# Patient Record
Sex: MALE | Race: WHITE | NOT HISPANIC OR LATINO
[De-identification: names, ages, dates, MRNs, and addresses within clinical notes are randomized per-mention and may not be internally consistent; named-entity substitution may affect disease eponyms.]

---

## 2017-01-12 ENCOUNTER — MEDICATION RENEWAL (OUTPATIENT)
Age: 82
End: 2017-01-12

## 2017-01-20 ENCOUNTER — MEDICATION RENEWAL (OUTPATIENT)
Age: 82
End: 2017-01-20

## 2017-02-03 ENCOUNTER — APPOINTMENT (OUTPATIENT)
Dept: FAMILY MEDICINE | Facility: CLINIC | Age: 82
End: 2017-02-03

## 2017-02-03 VITALS
SYSTOLIC BLOOD PRESSURE: 130 MMHG | WEIGHT: 185 LBS | OXYGEN SATURATION: 94 % | TEMPERATURE: 98.2 F | HEART RATE: 72 BPM | DIASTOLIC BLOOD PRESSURE: 78 MMHG | RESPIRATION RATE: 16 BRPM | BODY MASS INDEX: 26.48 KG/M2 | HEIGHT: 70 IN

## 2017-02-13 ENCOUNTER — RX RENEWAL (OUTPATIENT)
Age: 82
End: 2017-02-13

## 2017-03-13 ENCOUNTER — RX RENEWAL (OUTPATIENT)
Age: 82
End: 2017-03-13

## 2017-03-18 ENCOUNTER — RX RENEWAL (OUTPATIENT)
Age: 82
End: 2017-03-18

## 2017-04-04 ENCOUNTER — APPOINTMENT (OUTPATIENT)
Dept: NEPHROLOGY | Facility: CLINIC | Age: 82
End: 2017-04-04

## 2017-04-04 VITALS
DIASTOLIC BLOOD PRESSURE: 73 MMHG | HEIGHT: 70 IN | OXYGEN SATURATION: 96 % | HEART RATE: 64 BPM | WEIGHT: 186 LBS | SYSTOLIC BLOOD PRESSURE: 165 MMHG | BODY MASS INDEX: 26.63 KG/M2

## 2017-04-04 VITALS — DIASTOLIC BLOOD PRESSURE: 62 MMHG | SYSTOLIC BLOOD PRESSURE: 128 MMHG

## 2017-04-05 LAB
ALBUMIN SERPL ELPH-MCNC: 3.9 G/DL
ANION GAP SERPL CALC-SCNC: 16 MMOL/L
BASOPHILS # BLD AUTO: 0.05 K/UL
BASOPHILS NFR BLD AUTO: 0.7 %
BUN SERPL-MCNC: 26 MG/DL
CALCIUM SERPL-MCNC: 9.2 MG/DL
CHLORIDE SERPL-SCNC: 105 MMOL/L
CO2 SERPL-SCNC: 22 MMOL/L
CREAT SERPL-MCNC: 1.5 MG/DL
CREAT SPEC-SCNC: 104 MG/DL
CREAT/PROT UR: 0.2 RATIO
EOSINOPHIL # BLD AUTO: 0.25 K/UL
EOSINOPHIL NFR BLD AUTO: 3.4 %
GLUCOSE SERPL-MCNC: 120 MG/DL
HCT VFR BLD CALC: 42.6 %
HGB BLD-MCNC: 13.5 G/DL
IMM GRANULOCYTES NFR BLD AUTO: 0.1 %
LYMPHOCYTES # BLD AUTO: 1.58 K/UL
LYMPHOCYTES NFR BLD AUTO: 21.7 %
MAN DIFF?: NORMAL
MCHC RBC-ENTMCNC: 28.1 PG
MCHC RBC-ENTMCNC: 31.7 GM/DL
MCV RBC AUTO: 88.8 FL
MONOCYTES # BLD AUTO: 0.69 K/UL
MONOCYTES NFR BLD AUTO: 9.5 %
NEUTROPHILS # BLD AUTO: 4.71 K/UL
NEUTROPHILS NFR BLD AUTO: 64.6 %
PHOSPHATE SERPL-MCNC: 2.4 MG/DL
PLATELET # BLD AUTO: 226 K/UL
POTASSIUM SERPL-SCNC: 4.3 MMOL/L
PROT UR-MCNC: 24 MG/DL
RBC # BLD: 4.8 M/UL
RBC # FLD: 14.5 %
SODIUM SERPL-SCNC: 143 MMOL/L
WBC # FLD AUTO: 7.29 K/UL

## 2017-04-07 LAB
25(OH)D3 SERPL-MCNC: 34 NG/ML
CALCIUM SERPL-MCNC: 9.7 MG/DL
PARATHYROID HORMONE INTACT: 45 PG/ML

## 2017-04-28 ENCOUNTER — RX RENEWAL (OUTPATIENT)
Age: 82
End: 2017-04-28

## 2017-05-15 ENCOUNTER — MEDICATION RENEWAL (OUTPATIENT)
Age: 82
End: 2017-05-15

## 2017-05-15 ENCOUNTER — RX RENEWAL (OUTPATIENT)
Age: 82
End: 2017-05-15

## 2017-06-21 ENCOUNTER — APPOINTMENT (OUTPATIENT)
Dept: FAMILY MEDICINE | Facility: CLINIC | Age: 82
End: 2017-06-21

## 2017-06-29 ENCOUNTER — APPOINTMENT (OUTPATIENT)
Dept: NEPHROLOGY | Facility: CLINIC | Age: 82
End: 2017-06-29

## 2017-06-29 VITALS — DIASTOLIC BLOOD PRESSURE: 70 MMHG | HEART RATE: 70 BPM | SYSTOLIC BLOOD PRESSURE: 124 MMHG

## 2017-06-29 VITALS
OXYGEN SATURATION: 96 % | WEIGHT: 186.29 LBS | SYSTOLIC BLOOD PRESSURE: 180 MMHG | DIASTOLIC BLOOD PRESSURE: 77 MMHG | HEIGHT: 70 IN | BODY MASS INDEX: 26.67 KG/M2 | HEART RATE: 71 BPM

## 2017-06-29 VITALS — SYSTOLIC BLOOD PRESSURE: 140 MMHG | HEART RATE: 70 BPM | DIASTOLIC BLOOD PRESSURE: 70 MMHG

## 2017-07-11 ENCOUNTER — MEDICATION RENEWAL (OUTPATIENT)
Age: 82
End: 2017-07-11

## 2017-07-24 ENCOUNTER — APPOINTMENT (OUTPATIENT)
Dept: FAMILY MEDICINE | Facility: CLINIC | Age: 82
End: 2017-07-24

## 2017-07-24 VITALS
HEART RATE: 61 BPM | TEMPERATURE: 98.1 F | OXYGEN SATURATION: 96 % | SYSTOLIC BLOOD PRESSURE: 123 MMHG | DIASTOLIC BLOOD PRESSURE: 69 MMHG

## 2017-07-24 VITALS — RESPIRATION RATE: 14 BRPM

## 2017-07-24 DIAGNOSIS — C44.90 UNSPECIFIED MALIGNANT NEOPLASM OF SKIN, UNSPECIFIED: ICD-10-CM

## 2017-07-24 DIAGNOSIS — R79.89 OTHER SPECIFIED ABNORMAL FINDINGS OF BLOOD CHEMISTRY: ICD-10-CM

## 2017-07-26 LAB
ALBUMIN SERPL ELPH-MCNC: 4.4 G/DL
ALP BLD-CCNC: 71 U/L
ALT SERPL-CCNC: 15 U/L
ANION GAP SERPL CALC-SCNC: 13 MMOL/L
AST SERPL-CCNC: 17 U/L
BASOPHILS # BLD AUTO: 0.07 K/UL
BASOPHILS NFR BLD AUTO: 1.1 %
BILIRUB SERPL-MCNC: 0.2 MG/DL
BUN SERPL-MCNC: 27 MG/DL
CALCIUM SERPL-MCNC: 9.5 MG/DL
CHLORIDE SERPL-SCNC: 104 MMOL/L
CHOLEST SERPL-MCNC: 204 MG/DL
CHOLEST/HDLC SERPL: 4.4 RATIO
CK SERPL-CCNC: 92 U/L
CO2 SERPL-SCNC: 25 MMOL/L
CREAT SERPL-MCNC: 1.7 MG/DL
EOSINOPHIL # BLD AUTO: 0.19 K/UL
EOSINOPHIL NFR BLD AUTO: 3.1 %
FERRITIN SERPL-MCNC: 46 NG/ML
FOLATE SERPL-MCNC: >20 NG/ML
GLUCOSE SERPL-MCNC: 181 MG/DL
HBA1C MFR BLD HPLC: 5.8 %
HCT VFR BLD CALC: 44 %
HDLC SERPL-MCNC: 46 MG/DL
HGB BLD-MCNC: 13.5 G/DL
IMM GRANULOCYTES NFR BLD AUTO: 0.2 %
IRON SATN MFR SERPL: 14 %
IRON SERPL-MCNC: 45 UG/DL
LDLC SERPL CALC-MCNC: 90 MG/DL
LYMPHOCYTES # BLD AUTO: 1.54 K/UL
LYMPHOCYTES NFR BLD AUTO: 24.9 %
MAN DIFF?: NORMAL
MCHC RBC-ENTMCNC: 27.9 PG
MCHC RBC-ENTMCNC: 30.7 GM/DL
MCV RBC AUTO: 90.9 FL
MONOCYTES # BLD AUTO: 0.57 K/UL
MONOCYTES NFR BLD AUTO: 9.2 %
NEUTROPHILS # BLD AUTO: 3.81 K/UL
NEUTROPHILS NFR BLD AUTO: 61.5 %
PLATELET # BLD AUTO: 228 K/UL
POTASSIUM SERPL-SCNC: 4.7 MMOL/L
PROT SERPL-MCNC: 7.1 G/DL
RBC # BLD: 4.84 M/UL
RBC # FLD: 15.1 %
SODIUM SERPL-SCNC: 142 MMOL/L
TIBC SERPL-MCNC: 311 UG/DL
TRIGL SERPL-MCNC: 341 MG/DL
UIBC SERPL-MCNC: 266 UG/DL
VIT B12 SERPL-MCNC: 496 PG/ML
WBC # FLD AUTO: 6.19 K/UL

## 2017-08-09 ENCOUNTER — MEDICATION RENEWAL (OUTPATIENT)
Age: 82
End: 2017-08-09

## 2017-08-29 ENCOUNTER — OTHER (OUTPATIENT)
Age: 82
End: 2017-08-29

## 2017-09-16 ENCOUNTER — RX RENEWAL (OUTPATIENT)
Age: 82
End: 2017-09-16

## 2017-09-25 ENCOUNTER — RX RENEWAL (OUTPATIENT)
Age: 82
End: 2017-09-25

## 2017-09-27 ENCOUNTER — RX RENEWAL (OUTPATIENT)
Age: 82
End: 2017-09-27

## 2017-09-28 ENCOUNTER — APPOINTMENT (OUTPATIENT)
Dept: FAMILY MEDICINE | Facility: CLINIC | Age: 82
End: 2017-09-28
Payer: MEDICARE

## 2017-09-28 ENCOUNTER — RX RENEWAL (OUTPATIENT)
Age: 82
End: 2017-09-28

## 2017-09-28 PROCEDURE — 90688 IIV4 VACCINE SPLT 0.5 ML IM: CPT

## 2017-09-28 PROCEDURE — G0008: CPT

## 2017-10-05 ENCOUNTER — APPOINTMENT (OUTPATIENT)
Dept: FAMILY MEDICINE | Facility: CLINIC | Age: 82
End: 2017-10-05

## 2017-11-01 ENCOUNTER — MEDICATION RENEWAL (OUTPATIENT)
Age: 82
End: 2017-11-01

## 2017-11-03 ENCOUNTER — APPOINTMENT (OUTPATIENT)
Dept: NEPHROLOGY | Facility: CLINIC | Age: 82
End: 2017-11-03
Payer: MEDICARE

## 2017-11-03 VITALS — SYSTOLIC BLOOD PRESSURE: 134 MMHG | DIASTOLIC BLOOD PRESSURE: 80 MMHG | HEART RATE: 64 BPM

## 2017-11-03 VITALS
HEIGHT: 70 IN | DIASTOLIC BLOOD PRESSURE: 78 MMHG | WEIGHT: 187.39 LBS | HEART RATE: 66 BPM | SYSTOLIC BLOOD PRESSURE: 159 MMHG | OXYGEN SATURATION: 94 % | BODY MASS INDEX: 26.83 KG/M2

## 2017-11-03 LAB
25(OH)D3 SERPL-MCNC: 26.8 NG/ML
ALBUMIN SERPL ELPH-MCNC: 4.1 G/DL
ALP BLD-CCNC: 68 U/L
ALT SERPL-CCNC: 20 U/L
ANION GAP SERPL CALC-SCNC: 15 MMOL/L
AST SERPL-CCNC: 22 U/L
BASOPHILS # BLD AUTO: 0.05 K/UL
BASOPHILS NFR BLD AUTO: 0.7 %
BILIRUB SERPL-MCNC: 0.3 MG/DL
BUN SERPL-MCNC: 26 MG/DL
CALCIUM SERPL-MCNC: 9.4 MG/DL
CALCIUM SERPL-MCNC: 9.4 MG/DL
CHLORIDE SERPL-SCNC: 104 MMOL/L
CHOLEST SERPL-MCNC: 197 MG/DL
CHOLEST/HDLC SERPL: 4 RATIO
CO2 SERPL-SCNC: 24 MMOL/L
CREAT SERPL-MCNC: 1.6 MG/DL
CREAT SPEC-SCNC: 149 MG/DL
EOSINOPHIL # BLD AUTO: 0.18 K/UL
EOSINOPHIL NFR BLD AUTO: 2.5 %
GLUCOSE SERPL-MCNC: 92 MG/DL
HCT VFR BLD CALC: 44.5 %
HDLC SERPL-MCNC: 49 MG/DL
HGB BLD-MCNC: 14.1 G/DL
IMM GRANULOCYTES NFR BLD AUTO: 0.1 %
LDLC SERPL CALC-MCNC: 104 MG/DL
LYMPHOCYTES # BLD AUTO: 1.73 K/UL
LYMPHOCYTES NFR BLD AUTO: 24.4 %
MAN DIFF?: NORMAL
MCHC RBC-ENTMCNC: 28.5 PG
MCHC RBC-ENTMCNC: 31.7 GM/DL
MCV RBC AUTO: 89.9 FL
MICROALBUMIN 24H UR DL<=1MG/L-MCNC: 14 MG/DL
MICROALBUMIN/CREAT 24H UR-RTO: 94 MG/G
MONOCYTES # BLD AUTO: 0.69 K/UL
MONOCYTES NFR BLD AUTO: 9.7 %
NEUTROPHILS # BLD AUTO: 4.43 K/UL
NEUTROPHILS NFR BLD AUTO: 62.6 %
PARATHYROID HORMONE INTACT: 70 PG/ML
PHOSPHATE SERPL-MCNC: 2.3 MG/DL
PLATELET # BLD AUTO: 208 K/UL
POTASSIUM SERPL-SCNC: 4.8 MMOL/L
PROT SERPL-MCNC: 7.8 G/DL
RBC # BLD: 4.95 M/UL
RBC # FLD: 14.9 %
SODIUM SERPL-SCNC: 143 MMOL/L
TRIGL SERPL-MCNC: 222 MG/DL
URATE SERPL-MCNC: 6.7 MG/DL
WBC # FLD AUTO: 7.09 K/UL

## 2017-11-03 PROCEDURE — 36415 COLL VENOUS BLD VENIPUNCTURE: CPT

## 2017-11-03 PROCEDURE — 99214 OFFICE O/P EST MOD 30 MIN: CPT | Mod: 25

## 2017-11-13 ENCOUNTER — MEDICATION RENEWAL (OUTPATIENT)
Age: 82
End: 2017-11-13

## 2018-01-19 ENCOUNTER — MEDICATION RENEWAL (OUTPATIENT)
Age: 83
End: 2018-01-19

## 2018-01-25 ENCOUNTER — RX RENEWAL (OUTPATIENT)
Age: 83
End: 2018-01-25

## 2018-02-05 ENCOUNTER — APPOINTMENT (OUTPATIENT)
Dept: FAMILY MEDICINE | Facility: CLINIC | Age: 83
End: 2018-02-05

## 2018-02-15 ENCOUNTER — APPOINTMENT (OUTPATIENT)
Dept: FAMILY MEDICINE | Facility: CLINIC | Age: 83
End: 2018-02-15
Payer: MEDICARE

## 2018-02-15 VITALS
HEIGHT: 70 IN | OXYGEN SATURATION: 88 % | HEART RATE: 74 BPM | WEIGHT: 180 LBS | SYSTOLIC BLOOD PRESSURE: 150 MMHG | DIASTOLIC BLOOD PRESSURE: 82 MMHG | TEMPERATURE: 98.5 F | BODY MASS INDEX: 25.77 KG/M2

## 2018-02-15 DIAGNOSIS — R05 COUGH: ICD-10-CM

## 2018-02-15 DIAGNOSIS — R06.02 SHORTNESS OF BREATH: ICD-10-CM

## 2018-02-15 PROCEDURE — 99214 OFFICE O/P EST MOD 30 MIN: CPT

## 2018-02-26 ENCOUNTER — APPOINTMENT (OUTPATIENT)
Dept: FAMILY MEDICINE | Facility: CLINIC | Age: 83
End: 2018-02-26
Payer: MEDICARE

## 2018-02-26 VITALS
HEIGHT: 70 IN | DIASTOLIC BLOOD PRESSURE: 68 MMHG | BODY MASS INDEX: 25.77 KG/M2 | HEART RATE: 73 BPM | WEIGHT: 180 LBS | OXYGEN SATURATION: 91 % | SYSTOLIC BLOOD PRESSURE: 150 MMHG

## 2018-02-26 VITALS — RESPIRATION RATE: 14 BRPM

## 2018-02-26 DIAGNOSIS — I65.29 OCCLUSION AND STENOSIS OF UNSPECIFIED CAROTID ARTERY: ICD-10-CM

## 2018-02-26 DIAGNOSIS — Z87.09 PERSONAL HISTORY OF OTHER DISEASES OF THE RESPIRATORY SYSTEM: ICD-10-CM

## 2018-02-26 PROCEDURE — 99214 OFFICE O/P EST MOD 30 MIN: CPT

## 2018-03-30 ENCOUNTER — RX RENEWAL (OUTPATIENT)
Age: 83
End: 2018-03-30

## 2018-04-06 ENCOUNTER — APPOINTMENT (OUTPATIENT)
Dept: NEPHROLOGY | Facility: CLINIC | Age: 83
End: 2018-04-06
Payer: MEDICARE

## 2018-04-06 VITALS — SYSTOLIC BLOOD PRESSURE: 180 MMHG | HEART RATE: 60 BPM | DIASTOLIC BLOOD PRESSURE: 80 MMHG

## 2018-04-06 VITALS
BODY MASS INDEX: 26.2 KG/M2 | DIASTOLIC BLOOD PRESSURE: 83 MMHG | HEART RATE: 61 BPM | OXYGEN SATURATION: 96 % | SYSTOLIC BLOOD PRESSURE: 191 MMHG | WEIGHT: 183 LBS | HEIGHT: 70 IN

## 2018-04-06 VITALS — DIASTOLIC BLOOD PRESSURE: 80 MMHG | SYSTOLIC BLOOD PRESSURE: 150 MMHG

## 2018-04-06 PROCEDURE — 99214 OFFICE O/P EST MOD 30 MIN: CPT | Mod: 25

## 2018-04-06 PROCEDURE — 36415 COLL VENOUS BLD VENIPUNCTURE: CPT

## 2018-04-11 LAB
25(OH)D3 SERPL-MCNC: 23.6 NG/ML
ALBUMIN SERPL ELPH-MCNC: 4.2 G/DL
ALP BLD-CCNC: 59 U/L
ALT SERPL-CCNC: 19 U/L
ANION GAP SERPL CALC-SCNC: 14 MMOL/L
AST SERPL-CCNC: 21 U/L
BASOPHILS # BLD AUTO: 0.03 K/UL
BASOPHILS NFR BLD AUTO: 0.5 %
BILIRUB SERPL-MCNC: 0.4 MG/DL
BUN SERPL-MCNC: 24 MG/DL
CALCIUM SERPL-MCNC: 9.6 MG/DL
CALCIUM SERPL-MCNC: 9.6 MG/DL
CHLORIDE SERPL-SCNC: 103 MMOL/L
CHOLEST SERPL-MCNC: 192 MG/DL
CHOLEST/HDLC SERPL: 3.8 RATIO
CO2 SERPL-SCNC: 24 MMOL/L
CREAT SERPL-MCNC: 1.58 MG/DL
EOSINOPHIL # BLD AUTO: 0.21 K/UL
EOSINOPHIL NFR BLD AUTO: 3.6 %
FERRITIN SERPL-MCNC: 42 NG/ML
GLUCOSE SERPL-MCNC: 97 MG/DL
HBA1C MFR BLD HPLC: 6.1 %
HCT VFR BLD CALC: 44.2 %
HDLC SERPL-MCNC: 51 MG/DL
HGB BLD-MCNC: 13.9 G/DL
IMM GRANULOCYTES NFR BLD AUTO: 0.2 %
IRON SATN MFR SERPL: 22 %
IRON SERPL-MCNC: 69 UG/DL
LDLC SERPL CALC-MCNC: 95 MG/DL
LYMPHOCYTES # BLD AUTO: 1.57 K/UL
LYMPHOCYTES NFR BLD AUTO: 27 %
MAN DIFF?: NORMAL
MCHC RBC-ENTMCNC: 29 PG
MCHC RBC-ENTMCNC: 31.4 GM/DL
MCV RBC AUTO: 92.1 FL
MONOCYTES # BLD AUTO: 0.6 K/UL
MONOCYTES NFR BLD AUTO: 10.3 %
NEUTROPHILS # BLD AUTO: 3.4 K/UL
NEUTROPHILS NFR BLD AUTO: 58.4 %
PARATHYROID HORMONE INTACT: 86 PG/ML
PHOSPHATE SERPL-MCNC: 3.6 MG/DL
PLATELET # BLD AUTO: 224 K/UL
POTASSIUM SERPL-SCNC: 4.8 MMOL/L
PROT SERPL-MCNC: 7.3 G/DL
RBC # BLD: 4.8 M/UL
RBC # FLD: 15.6 %
SODIUM SERPL-SCNC: 141 MMOL/L
TIBC SERPL-MCNC: 313 UG/DL
TRIGL SERPL-MCNC: 228 MG/DL
UIBC SERPL-MCNC: 244 UG/DL
URATE SERPL-MCNC: 6.8 MG/DL
WBC # FLD AUTO: 5.82 K/UL

## 2018-04-30 ENCOUNTER — RX RENEWAL (OUTPATIENT)
Age: 83
End: 2018-04-30

## 2018-05-14 ENCOUNTER — MEDICATION RENEWAL (OUTPATIENT)
Age: 83
End: 2018-05-14

## 2018-05-22 ENCOUNTER — MEDICATION RENEWAL (OUTPATIENT)
Age: 83
End: 2018-05-22

## 2018-05-22 ENCOUNTER — MED ADMIN CHARGE (OUTPATIENT)
Age: 83
End: 2018-05-22

## 2018-05-22 RX ORDER — PROMETHAZINE HYDROCHLORIDE AND DEXTROMETHORPHAN HYDROBROMIDE ORAL SOLUTION 15; 6.25 MG/5ML; MG/5ML
6.25-15 SOLUTION ORAL
Qty: 100 | Refills: 0 | Status: COMPLETED | COMMUNITY
Start: 2018-02-15 | End: 2018-05-22

## 2018-05-22 RX ORDER — LEVOFLOXACIN 250 MG/1
250 TABLET, FILM COATED ORAL DAILY
Qty: 10 | Refills: 0 | Status: COMPLETED | COMMUNITY
Start: 2018-02-15 | End: 2018-05-22

## 2018-05-22 RX ORDER — METHYLPREDNISOLONE 4 MG/1
4 TABLET ORAL
Qty: 1 | Refills: 0 | Status: COMPLETED | COMMUNITY
Start: 2018-02-15 | End: 2018-05-22

## 2018-07-16 ENCOUNTER — MEDICATION RENEWAL (OUTPATIENT)
Age: 83
End: 2018-07-16

## 2018-08-03 ENCOUNTER — APPOINTMENT (OUTPATIENT)
Dept: FAMILY MEDICINE | Facility: CLINIC | Age: 83
End: 2018-08-03
Payer: MEDICARE

## 2018-08-03 VITALS
SYSTOLIC BLOOD PRESSURE: 166 MMHG | WEIGHT: 186 LBS | TEMPERATURE: 98 F | HEIGHT: 70 IN | OXYGEN SATURATION: 93 % | HEART RATE: 60 BPM | BODY MASS INDEX: 26.63 KG/M2 | DIASTOLIC BLOOD PRESSURE: 68 MMHG

## 2018-08-03 VITALS — RESPIRATION RATE: 14 BRPM

## 2018-08-03 PROCEDURE — 99214 OFFICE O/P EST MOD 30 MIN: CPT

## 2018-08-03 NOTE — HISTORY OF PRESENT ILLNESS
[FreeTextEntry1] : See history of present illness [de-identified] : Patient is an 84-year-old gentleman, who presents today for followup appointment. Patient states it is in his usual state of health. He has multiple medical problems. He denies any chest pain, shortness of breath, nausea, or vomiting, but does admit to lower extremity claudication. Patient has a history of anemia, which has resolved. Generalized anxiety well controlled with alprazolam, chronic kidney disease stage III, followed very closely by nephrology, hyperlipidemia, hypertension, and peripheral vascular disease. Patient is followed on a regular basis by vascular surgery, nephrology and urology.

## 2018-08-03 NOTE — ASSESSMENT
[FreeTextEntry1] : Assessment and plan:\par \par 1. Anxiety very well controlled with alprazolam. Patient takes alprazolam 0.25 mg only as needed up to 4 times a day. There is no sign of abuse. Patient takes medications as prescribed and usually lasts longer than the prescribed amount for the month.\par \par 2. Hypertension. Blood pressure has been running slightly elevated. Patient presently taking amlodipine 2.5 mg we will increase the amlodipine to 5 mg p.o. daily. Keep log of blood pressure. Patient will notify me if blood pressure increases.\par \par 3. Hyperlipidemia. Tolerating statin continue lovastatin 40 mg p.o. daily.\par \par 4. Essential tremor. Patient tolerating propranolol 60 mg twice a day.\par \par 5. BPH. Continue Terazosin 10 mg p.o. at bedtime followup with urology.\par \par 6. Chronic renal insufficiency, stable. Patient doing well. Follow up with nephrology.\par \par 7. Peripheral vascular disease, carotid stenosis, and status post CVA. Patient is followed on a regular basis by vascular surgery Dr. Cantu

## 2018-08-03 NOTE — COUNSELING
[Behavioral health counseling provided] : behavioral health  [Sleep ___ hours/day] : Sleep [unfilled] hours/day [Plan in advance] : Plan in advance [None] : None [Good understanding] : Patient has a good understanding of lifestyle changes and the steps needed to achieve self management goals

## 2018-08-03 NOTE — HEALTH RISK ASSESSMENT
[] : No [No falls in past year] : Patient reported no falls in the past year [0] : 2) Feeling down, depressed, or hopeless: Not at all (0) [de-identified] : An occasional glass of wine with meals [[id number]] : 0

## 2018-08-03 NOTE — PHYSICAL EXAM
[No Acute Distress] : no acute distress [Well Nourished] : well nourished [Well Developed] : well developed [Well-Appearing] : well-appearing [Normal Voice/Communication] : normal voice/communication [Normal Sclera/Conjunctiva] : normal sclera/conjunctiva [PERRL] : pupils equal round and reactive to light [EOMI] : extraocular movements intact [Normal Outer Ear/Nose] : the outer ears and nose were normal in appearance [Normal Oropharynx] : the oropharynx was normal [No JVD] : no jugular venous distention [Supple] : supple [No Lymphadenopathy] : no lymphadenopathy [Thyroid Normal, No Nodules] : the thyroid was normal and there were no nodules present [No Respiratory Distress] : no respiratory distress  [Clear to Auscultation] : lungs were clear to auscultation bilaterally [No Accessory Muscle Use] : no accessory muscle use [Normal Rate] : normal rate  [Regular Rhythm] : with a regular rhythm [Normal S1, S2] : normal S1 and S2 [No Murmur] : no murmur heard [No Carotid Bruits] : no carotid bruits [No Abdominal Bruit] : a ~M bruit was not heard ~T in the abdomen [No Varicosities] : no varicosities [Pedal Pulses Present] : the pedal pulses are present [No Edema] : there was no peripheral edema [No Extremity Clubbing/Cyanosis] : no extremity clubbing/cyanosis [No Palpable Aorta] : no palpable aorta [Soft] : abdomen soft [Non Tender] : non-tender [Non-distended] : non-distended [No Masses] : no abdominal mass palpated [No HSM] : no HSM [Normal Bowel Sounds] : normal bowel sounds [Normal Posterior Cervical Nodes] : no posterior cervical lymphadenopathy [Normal Anterior Cervical Nodes] : no anterior cervical lymphadenopathy [No CVA Tenderness] : no CVA  tenderness [No Spinal Tenderness] : no spinal tenderness [No Joint Swelling] : no joint swelling [Grossly Normal Strength/Tone] : grossly normal strength/tone [No Rash] : no rash [Normal Gait] : normal gait [Coordination Grossly Intact] : coordination grossly intact [No Focal Deficits] : no focal deficits [Deep Tendon Reflexes (DTR)] : deep tendon reflexes were 2+ and symmetric [Normal Affect] : the affect was normal [Normal Insight/Judgement] : insight and judgment were intact [de-identified] : Essential tremor

## 2018-08-28 ENCOUNTER — MEDICATION RENEWAL (OUTPATIENT)
Age: 83
End: 2018-08-28

## 2018-09-13 ENCOUNTER — MEDICATION RENEWAL (OUTPATIENT)
Age: 83
End: 2018-09-13

## 2018-09-19 ENCOUNTER — MEDICATION RENEWAL (OUTPATIENT)
Age: 83
End: 2018-09-19

## 2018-09-19 ENCOUNTER — APPOINTMENT (OUTPATIENT)
Dept: FAMILY MEDICINE | Facility: CLINIC | Age: 83
End: 2018-09-19
Payer: MEDICARE

## 2018-09-19 PROCEDURE — G0008: CPT

## 2018-09-19 PROCEDURE — 90662 IIV NO PRSV INCREASED AG IM: CPT

## 2018-10-08 ENCOUNTER — MEDICATION RENEWAL (OUTPATIENT)
Age: 83
End: 2018-10-08

## 2018-10-10 ENCOUNTER — MEDICATION RENEWAL (OUTPATIENT)
Age: 83
End: 2018-10-10

## 2018-10-24 ENCOUNTER — MEDICATION RENEWAL (OUTPATIENT)
Age: 83
End: 2018-10-24

## 2018-11-02 ENCOUNTER — APPOINTMENT (OUTPATIENT)
Dept: FAMILY MEDICINE | Facility: CLINIC | Age: 83
End: 2018-11-02
Payer: MEDICARE

## 2018-11-02 VITALS
WEIGHT: 183 LBS | SYSTOLIC BLOOD PRESSURE: 130 MMHG | DIASTOLIC BLOOD PRESSURE: 58 MMHG | TEMPERATURE: 98.1 F | OXYGEN SATURATION: 93 % | BODY MASS INDEX: 26.2 KG/M2 | HEIGHT: 70 IN | HEART RATE: 58 BPM

## 2018-11-02 VITALS — RESPIRATION RATE: 16 BRPM

## 2018-11-02 PROCEDURE — 36415 COLL VENOUS BLD VENIPUNCTURE: CPT

## 2018-11-02 PROCEDURE — 99214 OFFICE O/P EST MOD 30 MIN: CPT | Mod: 25

## 2018-11-02 RX ORDER — AMLODIPINE BESYLATE 2.5 MG/1
2.5 TABLET ORAL
Qty: 90 | Refills: 0 | Status: COMPLETED | COMMUNITY
Start: 2018-04-06

## 2018-11-02 NOTE — HISTORY OF PRESENT ILLNESS
[FreeTextEntry1] : See history of present illness [de-identified] : Patient is an 84-year-old gentleman presents today for followup appointment. Patient does have multiple medical problems which include anemia of chronic disease, generalized anxiety disorder well-controlled with alprazolam, benign prostatic hypertrophic chronic renal disease, stage III, for which the patient sees nephrology, hyperlipidemia, and hypertension.\par \par Presently, the patient is awake, alert, and oriented x3, in no acute distress. Presently. Denies any chest pain, shortness of breath, nausea, or vomiting. Patient is calm, cooperative, well-groomed, and nourished.

## 2018-11-02 NOTE — HEALTH RISK ASSESSMENT
[Patient declined bone density test] : Patient declined bone density test [HIV test declined] : HIV test declined [Change in mental status noted] : No change in mental status noted [Language] : denies difficulty with language [Behavior] : denies difficulty with behavior [Learning/Retaining New Information] : denies difficulty learning/retaining new information [Handling Complex Tasks] : denies difficulty handling complex tasks [Reasoning] : denies difficulty with reasoning [Spatial Ability and Orientation] : denies difficulty with spatial ability and orientation [None] : None [With Significant Other] : lives with significant other [Retired] : retired [High School] : high school [] :  [Sexually Active] : sexually active [High Risk Behavior] : no high risk behavior [Feels Safe at Home] : Feels safe at home [Fully functional (bathing, dressing, toileting, transferring, walking, feeding)] : Fully functional (bathing, dressing, toileting, transferring, walking, feeding) [Fully functional (using the telephone, shopping, preparing meals, housekeeping, doing laundry, using] : Fully functional and needs no help or supervision to perform IADLs (using the telephone, shopping, preparing meals, housekeeping, doing laundry, using transportation, managing medications and managing finances) [Reports changes in hearing] : Reports no changes in hearing [Reports changes in vision] : Reports no changes in vision [Reports changes in dental health] : Reports no changes in dental health [Smoke Detector] : smoke detector [Carbon Monoxide Detector] : carbon monoxide detector [Safety elements used in home] : safety elements used in home [Seat Belt] :  uses seat belt [Sunscreen] : uses sunscreen [Travel to Developing Areas] : does not  travel to developing areas [TB Exposure] : is not being exposed to tuberculosis [Caregiver Concerns] : does not have caregiver concerns [] : No [No falls in past year] : Patient reported no falls in the past year [0] : 2) Feeling down, depressed, or hopeless: Not at all (0) [[id number]] : 0

## 2018-11-02 NOTE — ASSESSMENT
[FreeTextEntry1] : Assessment and plan:\par \par 1. Hypertension. At last visit blood pressure was elevated. Amlodipine was increased to 5 mg p.o. daily. Patient responded well to the medication therefore, continue present medical management without change.\par \par 2. Hyperlipidemia. Continue lovastatin 40 mg p.o. at bedtime. Check comprehensive metabolic lipid profile, and CPK.\par \par 3. Essential tremor, stable with present medical management. No change.\par \par 4. Generalized anxiety disorder. Patient doing well with alprazolam 0.25 mg patient takes medication sparingly and only as needed. I stopped checked. There is no sign of abuse. Continue present meds.\par \par At this visit. Comprehensive blood work obtained

## 2018-11-05 LAB
ALBUMIN SERPL ELPH-MCNC: 4.1 G/DL
ALP BLD-CCNC: 60 U/L
ALT SERPL-CCNC: 17 U/L
ANION GAP SERPL CALC-SCNC: 12 MMOL/L
AST SERPL-CCNC: 15 U/L
BASOPHILS # BLD AUTO: 0.04 K/UL
BASOPHILS NFR BLD AUTO: 0.5 %
BILIRUB SERPL-MCNC: 0.4 MG/DL
BUN SERPL-MCNC: 26 MG/DL
CALCIUM SERPL-MCNC: 9.2 MG/DL
CHLORIDE SERPL-SCNC: 106 MMOL/L
CHOLEST SERPL-MCNC: 166 MG/DL
CHOLEST/HDLC SERPL: 4 RATIO
CK SERPL-CCNC: 93 U/L
CO2 SERPL-SCNC: 26 MMOL/L
CREAT SERPL-MCNC: 1.77 MG/DL
EOSINOPHIL # BLD AUTO: 0.24 K/UL
EOSINOPHIL NFR BLD AUTO: 3.3 %
FERRITIN SERPL-MCNC: 35 NG/ML
FOLATE SERPL-MCNC: >20 NG/ML
GLUCOSE SERPL-MCNC: 96 MG/DL
HBA1C MFR BLD HPLC: 6 %
HCT VFR BLD CALC: 40.9 %
HCV AB SER QL: NONREACTIVE
HCV S/CO RATIO: 0.13 S/CO
HDLC SERPL-MCNC: 42 MG/DL
HGB BLD-MCNC: 12.9 G/DL
IMM GRANULOCYTES NFR BLD AUTO: 0.1 %
IRON SATN MFR SERPL: 19 %
IRON SERPL-MCNC: 66 UG/DL
LDLC SERPL CALC-MCNC: 84 MG/DL
LYMPHOCYTES # BLD AUTO: 1.8 K/UL
LYMPHOCYTES NFR BLD AUTO: 24.4 %
MAN DIFF?: NORMAL
MCHC RBC-ENTMCNC: 28.7 PG
MCHC RBC-ENTMCNC: 31.5 GM/DL
MCV RBC AUTO: 91.1 FL
MONOCYTES # BLD AUTO: 0.83 K/UL
MONOCYTES NFR BLD AUTO: 11.2 %
NEUTROPHILS # BLD AUTO: 4.46 K/UL
NEUTROPHILS NFR BLD AUTO: 60.5 %
PLATELET # BLD AUTO: 230 K/UL
POTASSIUM SERPL-SCNC: 4.7 MMOL/L
PROT SERPL-MCNC: 6.8 G/DL
PSA SERPL-MCNC: 0.06 NG/ML
RBC # BLD: 4.49 M/UL
RBC # FLD: 14.5 %
SODIUM SERPL-SCNC: 144 MMOL/L
TIBC SERPL-MCNC: 341 UG/DL
TRIGL SERPL-MCNC: 201 MG/DL
UIBC SERPL-MCNC: 275 UG/DL
VIT B12 SERPL-MCNC: 629 PG/ML
WBC # FLD AUTO: 7.38 K/UL

## 2018-12-31 ENCOUNTER — APPOINTMENT (OUTPATIENT)
Dept: NEPHROLOGY | Facility: CLINIC | Age: 83
End: 2018-12-31
Payer: MEDICARE

## 2018-12-31 VITALS
HEIGHT: 70 IN | WEIGHT: 185.19 LBS | SYSTOLIC BLOOD PRESSURE: 151 MMHG | HEART RATE: 55 BPM | BODY MASS INDEX: 26.51 KG/M2 | DIASTOLIC BLOOD PRESSURE: 71 MMHG | OXYGEN SATURATION: 95 %

## 2018-12-31 VITALS — DIASTOLIC BLOOD PRESSURE: 78 MMHG | HEART RATE: 64 BPM | SYSTOLIC BLOOD PRESSURE: 140 MMHG

## 2018-12-31 PROCEDURE — 99214 OFFICE O/P EST MOD 30 MIN: CPT

## 2019-01-11 ENCOUNTER — MEDICATION RENEWAL (OUTPATIENT)
Age: 84
End: 2019-01-11

## 2019-02-01 ENCOUNTER — APPOINTMENT (OUTPATIENT)
Dept: FAMILY MEDICINE | Facility: CLINIC | Age: 84
End: 2019-02-01
Payer: MEDICARE

## 2019-02-01 VITALS
OXYGEN SATURATION: 93 % | WEIGHT: 182 LBS | BODY MASS INDEX: 26.05 KG/M2 | DIASTOLIC BLOOD PRESSURE: 68 MMHG | HEIGHT: 70 IN | HEART RATE: 53 BPM | SYSTOLIC BLOOD PRESSURE: 128 MMHG | TEMPERATURE: 98.1 F

## 2019-02-01 VITALS — RESPIRATION RATE: 14 BRPM

## 2019-02-01 PROCEDURE — 99214 OFFICE O/P EST MOD 30 MIN: CPT

## 2019-02-01 NOTE — ASSESSMENT
[FreeTextEntry1] : Assessment and plan:\par \par 1. Generalized anxiety well controlled with alprazolam 0.25 mg patient can take the medication up to 4 times a day. Patient takes medications very sparingly. I stopped check. There is no sign of abuse. Therefore, we will continue present medical management.\par \par 2. Hypertension excellent blood pressure control. Continue amlodipine 5 mg p.o. daily.\par \par 3. Essential tremor is stable and has not worsened presently controlled with propranolol 60 mg b.i.d.\par \par 4. Hyperlipidemia. Continue low-fat, low-cholesterol diet, and lovastatin 40 mg p.o. at bedtime.\par \par 5. BPH stable. Continue Terazol 7 10 mg at bedtime.\par \par 6. Chronic renal insufficiency, stage III, stable patient will be seeing nephrology in 2 months. Will have blood work done prior to that visit.

## 2019-02-01 NOTE — PHYSICAL EXAM
[No Acute Distress] : no acute distress [Well Nourished] : well nourished [Well Developed] : well developed [Well-Appearing] : well-appearing [Normal Voice/Communication] : normal voice/communication [Normal Sclera/Conjunctiva] : normal sclera/conjunctiva [PERRL] : pupils equal round and reactive to light [EOMI] : extraocular movements intact [Normal Outer Ear/Nose] : the outer ears and nose were normal in appearance [Normal Oropharynx] : the oropharynx was normal [No JVD] : no jugular venous distention [Supple] : supple [No Lymphadenopathy] : no lymphadenopathy [Thyroid Normal, No Nodules] : the thyroid was normal and there were no nodules present [No Respiratory Distress] : no respiratory distress  [Clear to Auscultation] : lungs were clear to auscultation bilaterally [No Accessory Muscle Use] : no accessory muscle use [Normal Rate] : normal rate  [Regular Rhythm] : with a regular rhythm [Normal S1, S2] : normal S1 and S2 [No Murmur] : no murmur heard [No Carotid Bruits] : no carotid bruits [No Abdominal Bruit] : a ~M bruit was not heard ~T in the abdomen [No Varicosities] : no varicosities [Pedal Pulses Present] : the pedal pulses are present [No Edema] : there was no peripheral edema [No Extremity Clubbing/Cyanosis] : no extremity clubbing/cyanosis [No Palpable Aorta] : no palpable aorta [Soft] : abdomen soft [Non Tender] : non-tender [Non-distended] : non-distended [No Masses] : no abdominal mass palpated [No HSM] : no HSM [Normal Bowel Sounds] : normal bowel sounds [Normal Posterior Cervical Nodes] : no posterior cervical lymphadenopathy [Normal Anterior Cervical Nodes] : no anterior cervical lymphadenopathy [No CVA Tenderness] : no CVA  tenderness [No Spinal Tenderness] : no spinal tenderness [No Joint Swelling] : no joint swelling [Grossly Normal Strength/Tone] : grossly normal strength/tone [No Rash] : no rash [Normal Gait] : normal gait [Coordination Grossly Intact] : coordination grossly intact [No Focal Deficits] : no focal deficits [Deep Tendon Reflexes (DTR)] : deep tendon reflexes were 2+ and symmetric [Speech Grossly Normal] : speech grossly normal [Memory Grossly Normal] : memory grossly normal [Normal Affect] : the affect was normal [Alert and Oriented x3] : oriented to person, place, and time [Normal Mood] : the mood was normal [Normal Insight/Judgement] : insight and judgment were intact [de-identified] : Essential tremor

## 2019-02-01 NOTE — HISTORY OF PRESENT ILLNESS
[FreeTextEntry1] : Please see history of present illness. [de-identified] : Patient is an 84-year-old gentleman, who presents today for followup appointment. Patient states, that he's been in his usual state of health has been taking all medications as prescribed. Medical history is significant for generalized anxiety very well controlled. Patient also has a history of chronic renal insufficiency, stage III, followed by myself and nephrology, hyperlipidemia, hypertension, and essential tremor.\par \par Patient is awake, alert, and oriented x3. He is presently in no acute distress. He is calm, cooperative, well-groomed, and nourished, he denies any chest pain , shortness of breath, nausea, or vomiting

## 2019-02-01 NOTE — HEALTH RISK ASSESSMENT
[] : No [No falls in past year] : Patient reported no falls in the past year [0] : 2) Feeling down, depressed, or hopeless: Not at all (0) [[id number]] : 0

## 2019-03-11 ENCOUNTER — MEDICATION RENEWAL (OUTPATIENT)
Age: 84
End: 2019-03-11

## 2019-04-25 ENCOUNTER — MEDICATION RENEWAL (OUTPATIENT)
Age: 84
End: 2019-04-25

## 2019-05-03 ENCOUNTER — APPOINTMENT (OUTPATIENT)
Dept: FAMILY MEDICINE | Facility: CLINIC | Age: 84
End: 2019-05-03
Payer: MEDICARE

## 2019-05-03 VITALS
OXYGEN SATURATION: 93 % | TEMPERATURE: 98.3 F | WEIGHT: 184 LBS | DIASTOLIC BLOOD PRESSURE: 72 MMHG | HEART RATE: 58 BPM | SYSTOLIC BLOOD PRESSURE: 134 MMHG | BODY MASS INDEX: 26.4 KG/M2

## 2019-05-03 VITALS — RESPIRATION RATE: 14 BRPM

## 2019-05-03 PROCEDURE — 99214 OFFICE O/P EST MOD 30 MIN: CPT | Mod: 25

## 2019-05-03 PROCEDURE — 36415 COLL VENOUS BLD VENIPUNCTURE: CPT

## 2019-05-03 NOTE — PHYSICAL EXAM
[No Acute Distress] : no acute distress [Well Nourished] : well nourished [Well Developed] : well developed [Well-Appearing] : well-appearing [Normal Voice/Communication] : normal voice/communication [Normal Sclera/Conjunctiva] : normal sclera/conjunctiva [PERRL] : pupils equal round and reactive to light [EOMI] : extraocular movements intact [Normal Outer Ear/Nose] : the outer ears and nose were normal in appearance [Normal Oropharynx] : the oropharynx was normal [No JVD] : no jugular venous distention [Thyroid Normal, No Nodules] : the thyroid was normal and there were no nodules present [No Lymphadenopathy] : no lymphadenopathy [Supple] : supple [Clear to Auscultation] : lungs were clear to auscultation bilaterally [No Respiratory Distress] : no respiratory distress  [No Accessory Muscle Use] : no accessory muscle use [Normal Rate] : normal rate  [Regular Rhythm] : with a regular rhythm [No Murmur] : no murmur heard [Normal S1, S2] : normal S1 and S2 [No Abdominal Bruit] : a ~M bruit was not heard ~T in the abdomen [No Carotid Bruits] : no carotid bruits [No Varicosities] : no varicosities [Pedal Pulses Present] : the pedal pulses are present [No Extremity Clubbing/Cyanosis] : no extremity clubbing/cyanosis [No Edema] : there was no peripheral edema [Non Tender] : non-tender [No Palpable Aorta] : no palpable aorta [Soft] : abdomen soft [Non-distended] : non-distended [No Masses] : no abdominal mass palpated [No HSM] : no HSM [Normal Bowel Sounds] : normal bowel sounds [Normal Posterior Cervical Nodes] : no posterior cervical lymphadenopathy [Normal Anterior Cervical Nodes] : no anterior cervical lymphadenopathy [No Spinal Tenderness] : no spinal tenderness [No CVA Tenderness] : no CVA  tenderness [No Joint Swelling] : no joint swelling [Grossly Normal Strength/Tone] : grossly normal strength/tone [No Rash] : no rash [Normal Gait] : normal gait [Coordination Grossly Intact] : coordination grossly intact [No Focal Deficits] : no focal deficits [Deep Tendon Reflexes (DTR)] : deep tendon reflexes were 2+ and symmetric [Speech Grossly Normal] : speech grossly normal [Memory Grossly Normal] : memory grossly normal [Alert and Oriented x3] : oriented to person, place, and time [Normal Affect] : the affect was normal [Normal Mood] : the mood was normal [Normal Insight/Judgement] : insight and judgment were intact [de-identified] : Essential tremor

## 2019-05-03 NOTE — ASSESSMENT
[FreeTextEntry1] : Assessment and plan:\par \par 1. Chronic renal insufficiency. Patient is avoiding all nephrotoxic substances. Obtain comprehensive metabolic followup with nephrology.\par \par 2. Hypertension excellent blood pressure control. Continue amlodipine 5 mg daily.\par \par 3. Essential tremor. Patient has had a good response to propranolol 60 mg p.o. twice a day.\par \par 4. Hyperlipidemia. Patient tolerating and doing well will lovastatin 40 mg p.o. at bedtime.\par \par 5. Generalized anxiety well controlled with alprazolam 0.25 mg up to 4 times a day only as needed. Patient has been taking medications very sparingly. I stopped checked. There is no sign of abuse.\par \par 6. Comprehensive blood work obtained. No change in present medical management.

## 2019-05-03 NOTE — HEALTH RISK ASSESSMENT
[No falls in past year] : Patient reported no falls in the past year [0] : 2) Feeling down, depressed, or hopeless: Not at all (0) [] : No [de-identified] : Social [[id number]] : 0

## 2019-05-03 NOTE — HISTORY OF PRESENT ILLNESS
[FreeTextEntry1] : See history of present illness [de-identified] : Is an 85-year-old gentleman presents today for followup appointment. Patient states, that he's been in his usual state of health. He presently denies any chest pain, shortness of breath, nausea, vomiting.\par \par We will be addressing anemia generalized anxiety disorder well-controlled chronic renal insufficiency. I will obtain comprehensive blood work, hyperlipidemia, and hypertension.\par \par Patient is awake, alert, oriented, x3, in no acute distress. He is calm and cooperative.

## 2019-05-06 LAB
24R-OH-CALCIDIOL SERPL-MCNC: 34.6 PG/ML
25(OH)D3 SERPL-MCNC: 28.2 NG/ML
ALBUMIN SERPL ELPH-MCNC: 4.1 G/DL
ALP BLD-CCNC: 63 U/L
ALT SERPL-CCNC: 16 U/L
ANION GAP SERPL CALC-SCNC: 13 MMOL/L
AST SERPL-CCNC: 14 U/L
BASOPHILS # BLD AUTO: 0.07 K/UL
BASOPHILS NFR BLD AUTO: 1 %
BILIRUB SERPL-MCNC: 0.3 MG/DL
BUN SERPL-MCNC: 30 MG/DL
CALCIUM SERPL-MCNC: 9.5 MG/DL
CHLORIDE SERPL-SCNC: 106 MMOL/L
CHOLEST SERPL-MCNC: 165 MG/DL
CHOLEST/HDLC SERPL: 3.8 RATIO
CO2 SERPL-SCNC: 24 MMOL/L
CREAT SERPL-MCNC: 1.6 MG/DL
EOSINOPHIL # BLD AUTO: 0.3 K/UL
EOSINOPHIL NFR BLD AUTO: 4.5 %
ESTIMATED AVERAGE GLUCOSE: 126 MG/DL
FERRITIN SERPL-MCNC: 24 NG/ML
FOLATE SERPL-MCNC: >20 NG/ML
GLUCOSE SERPL-MCNC: 95 MG/DL
HBA1C MFR BLD HPLC: 6 %
HCT VFR BLD CALC: 42.4 %
HDLC SERPL-MCNC: 44 MG/DL
HGB BLD-MCNC: 13.3 G/DL
IMM GRANULOCYTES NFR BLD AUTO: 0.4 %
IRON SATN MFR SERPL: 16 %
IRON SERPL-MCNC: 59 UG/DL
LDLC SERPL CALC-MCNC: 82 MG/DL
LYMPHOCYTES # BLD AUTO: 1.62 K/UL
LYMPHOCYTES NFR BLD AUTO: 24.2 %
MAN DIFF?: NORMAL
MCHC RBC-ENTMCNC: 28.5 PG
MCHC RBC-ENTMCNC: 31.4 GM/DL
MCV RBC AUTO: 91 FL
MONOCYTES # BLD AUTO: 0.73 K/UL
MONOCYTES NFR BLD AUTO: 10.9 %
NEUTROPHILS # BLD AUTO: 3.94 K/UL
NEUTROPHILS NFR BLD AUTO: 59 %
PLATELET # BLD AUTO: 223 K/UL
POTASSIUM SERPL-SCNC: 4.9 MMOL/L
PROT SERPL-MCNC: 6.6 G/DL
RBC # BLD: 4.66 M/UL
RBC # FLD: 14.5 %
SODIUM SERPL-SCNC: 143 MMOL/L
TIBC SERPL-MCNC: 370 UG/DL
TRIGL SERPL-MCNC: 194 MG/DL
UIBC SERPL-MCNC: 311 UG/DL
VIT B12 SERPL-MCNC: 549 PG/ML
WBC # FLD AUTO: 6.69 K/UL

## 2019-05-09 ENCOUNTER — MEDICATION RENEWAL (OUTPATIENT)
Age: 84
End: 2019-05-09

## 2019-05-10 ENCOUNTER — MEDICATION RENEWAL (OUTPATIENT)
Age: 84
End: 2019-05-10

## 2019-07-01 ENCOUNTER — APPOINTMENT (OUTPATIENT)
Dept: NEPHROLOGY | Facility: CLINIC | Age: 84
End: 2019-07-01
Payer: MEDICARE

## 2019-07-01 VITALS
BODY MASS INDEX: 26.34 KG/M2 | HEART RATE: 65 BPM | DIASTOLIC BLOOD PRESSURE: 66 MMHG | WEIGHT: 184 LBS | SYSTOLIC BLOOD PRESSURE: 152 MMHG | HEIGHT: 70 IN

## 2019-07-01 VITALS — SYSTOLIC BLOOD PRESSURE: 132 MMHG | DIASTOLIC BLOOD PRESSURE: 70 MMHG | HEART RATE: 64 BPM

## 2019-07-01 PROCEDURE — 99213 OFFICE O/P EST LOW 20 MIN: CPT

## 2019-07-01 NOTE — ASSESSMENT
[FreeTextEntry1] : 85 year old M with history of HTN, history of chronic meloxicam use, with CKD3\par CKD stage 3: Stable renal function GFR ranges between 35-40\par Hypertension better controlled\par Continue terazosin and propranolol\par Anemia -improved\par Follow up in 6 months

## 2019-07-01 NOTE — HISTORY OF PRESENT ILLNESS
[FreeTextEntry1] : 85 year old male with history of hypertension, prostate cancer s/p seeds, CVA, osteoarthritis had used NSAIDs chronically in past, essential tremor who presents for follow up of CKD3\par Denies any new issues\par

## 2019-07-01 NOTE — PHYSICAL EXAM
[General Appearance - Alert] : alert [General Appearance - In No Acute Distress] : in no acute distress [Sclera] : the sclera and conjunctiva were normal [Neck Appearance] : the appearance of the neck was normal [Auscultation Breath Sounds / Voice Sounds] : lungs were clear to auscultation bilaterally [Heart Rate And Rhythm] : heart rate was normal and rhythm regular [Heart Sounds] : normal S1 and S2 [Murmurs] : no murmurs [Heart Sounds Pericardial Friction Rub] : no pericardial rub [Edema] : there was no peripheral edema [Bowel Sounds] : normal bowel sounds [Abdomen Soft] : soft [No CVA Tenderness] : no ~M costovertebral angle tenderness [] : no rash [Oriented To Time, Place, And Person] : oriented to person, place, and time [Affect] : the affect was normal [Mood] : the mood was normal [Outer Ear] : the ears and nose were normal in appearance [No Focal Deficits] : no focal deficits

## 2019-07-11 ENCOUNTER — MEDICATION RENEWAL (OUTPATIENT)
Age: 84
End: 2019-07-11

## 2019-08-12 ENCOUNTER — RX RENEWAL (OUTPATIENT)
Age: 84
End: 2019-08-12

## 2019-08-19 ENCOUNTER — APPOINTMENT (OUTPATIENT)
Dept: FAMILY MEDICINE | Facility: CLINIC | Age: 84
End: 2019-08-19
Payer: MEDICARE

## 2019-08-28 ENCOUNTER — APPOINTMENT (OUTPATIENT)
Dept: FAMILY MEDICINE | Facility: CLINIC | Age: 84
End: 2019-08-28
Payer: MEDICARE

## 2019-08-28 VITALS
HEART RATE: 58 BPM | WEIGHT: 185 LBS | HEIGHT: 70 IN | BODY MASS INDEX: 26.48 KG/M2 | DIASTOLIC BLOOD PRESSURE: 60 MMHG | OXYGEN SATURATION: 93 % | SYSTOLIC BLOOD PRESSURE: 142 MMHG | TEMPERATURE: 97.7 F

## 2019-08-28 DIAGNOSIS — E55.9 VITAMIN D DEFICIENCY, UNSPECIFIED: ICD-10-CM

## 2019-08-28 PROCEDURE — 90653 IIV ADJUVANT VACCINE IM: CPT

## 2019-08-28 PROCEDURE — 99214 OFFICE O/P EST MOD 30 MIN: CPT | Mod: 25

## 2019-08-28 PROCEDURE — G0008: CPT

## 2019-08-28 NOTE — ASSESSMENT
[FreeTextEntry1] : Assessment and plan:\par \par 1. Generalized anxiety well controlled with alprazolam 0.25 mg up to 4 times a day only as needed. Patient uses medications very sparingly. I stopped check. There is no sign of abuse.\par \par 2. Hypertension excellent blood pressure control with amlodipine 5 mg p.o. daily.\par \par 3. Gastroesophageal reflux disease. Patient has had excellent control with proton pump inhibitor.\par \par 4. Hyperlipidemia. Continue lovastatin 40 mg p.o. at bedtime and low-fat, low-cholesterol diet.\par \par 5. Essential tremor. Patient doing well with propranolol 60 mg twice a day.\par \par 6. Benign prostatic hypertrophic/urinary frequency. Continue Terazosin 10 mg p.o. at bedtime\par \par 7. Face-to-face with patient 25 minutes

## 2019-08-28 NOTE — COUNSELING
[Fall prevention counseling provided] : Fall prevention counseling provided [Adequate lighting] : Adequate lighting [No throw rugs] : No throw rugs [Use proper foot wear] : Use proper foot wear [Behavioral health counseling provided] : Behavioral health counseling provided [Sleep ___ hours/day] : Sleep [unfilled] hours/day [Engage in a relaxing activity] : Engage in a relaxing activity [Plan in advance] : Plan in advance [AUDIT-C Screening administered and reviewed] : AUDIT-C Screening administered and reviewed [None] : None [Good understanding] : Patient has a good understanding of lifestyle changes and steps needed to achieve self management goal

## 2019-08-28 NOTE — HISTORY OF PRESENT ILLNESS
[de-identified] : Patient is an 85-year-old gentleman, who presents today for followup appointment. Patient states that he is in his usual state of health. He denies any chest pain, shortness of breath, nausea, or vomiting.\par \par Patient's medical history is significant for anemia generalized anxiety disorder, chronic renal insufficiency, stage III, followed closely by nephrology, gastroesophageal reflux disease, well-controlled hyperlipidemia, hypertension, essential tremor, and vitamin D deficiency. Patient also has history of peripheral vascular disease.\par \par Patient is awake, alert, and oriented x3. He is in no acute distress. He is calm, cooperative, well-groomed, and nourished. [FreeTextEntry1] : Please see history of present illness

## 2019-08-28 NOTE — HEALTH RISK ASSESSMENT
[4 or more  times a week (4 pts)] : 4 or more  times a week (4 points) [1 or 2 (0 pts)] : 1 or 2 (0 points) [Never (0 pts)] : Never (0 points) [No] : In the past 12 months have you used drugs other than those required for medical reasons? No [No falls in past year] : Patient reported no falls in the past year [0] : 2) Feeling down, depressed, or hopeless: Not at all (0) [] : No [[id number]] : 0 [Audit-CScore] : 4

## 2019-08-28 NOTE — PHYSICAL EXAM
[No Acute Distress] : no acute distress [Well Nourished] : well nourished [Well Developed] : well developed [Well-Appearing] : well-appearing [Normal Sclera/Conjunctiva] : normal sclera/conjunctiva [PERRL] : pupils equal round and reactive to light [EOMI] : extraocular movements intact [Normal Outer Ear/Nose] : the outer ears and nose were normal in appearance [Normal Oropharynx] : the oropharynx was normal [No JVD] : no jugular venous distention [No Lymphadenopathy] : no lymphadenopathy [Supple] : supple [Thyroid Normal, No Nodules] : the thyroid was normal and there were no nodules present [No Respiratory Distress] : no respiratory distress  [No Accessory Muscle Use] : no accessory muscle use [Clear to Auscultation] : lungs were clear to auscultation bilaterally [Normal Rate] : normal rate  [Regular Rhythm] : with a regular rhythm [Normal S1, S2] : normal S1 and S2 [No Murmur] : no murmur heard [No Carotid Bruits] : no carotid bruits [No Abdominal Bruit] : a ~M bruit was not heard ~T in the abdomen [No Varicosities] : no varicosities [Pedal Pulses Present] : the pedal pulses are present [No Edema] : there was no peripheral edema [No Palpable Aorta] : no palpable aorta [No Extremity Clubbing/Cyanosis] : no extremity clubbing/cyanosis [Soft] : abdomen soft [Non Tender] : non-tender [Non-distended] : non-distended [No Masses] : no abdominal mass palpated [No HSM] : no HSM [Normal Bowel Sounds] : normal bowel sounds [Normal Posterior Cervical Nodes] : no posterior cervical lymphadenopathy [Normal Anterior Cervical Nodes] : no anterior cervical lymphadenopathy [No CVA Tenderness] : no CVA  tenderness [No Spinal Tenderness] : no spinal tenderness [No Joint Swelling] : no joint swelling [No Rash] : no rash [Grossly Normal Strength/Tone] : grossly normal strength/tone [No Focal Deficits] : no focal deficits [Coordination Grossly Intact] : coordination grossly intact [Normal Gait] : normal gait [Normal Affect] : the affect was normal [Deep Tendon Reflexes (DTR)] : deep tendon reflexes were 2+ and symmetric [Normal Insight/Judgement] : insight and judgment were intact [de-identified] : tremor

## 2019-09-12 ENCOUNTER — MEDICATION RENEWAL (OUTPATIENT)
Age: 84
End: 2019-09-12

## 2019-09-16 ENCOUNTER — RX RENEWAL (OUTPATIENT)
Age: 84
End: 2019-09-16

## 2019-11-12 ENCOUNTER — MEDICATION RENEWAL (OUTPATIENT)
Age: 84
End: 2019-11-12

## 2019-11-13 ENCOUNTER — MEDICATION RENEWAL (OUTPATIENT)
Age: 84
End: 2019-11-13

## 2019-12-20 ENCOUNTER — APPOINTMENT (OUTPATIENT)
Dept: FAMILY MEDICINE | Facility: CLINIC | Age: 84
End: 2019-12-20
Payer: MEDICARE

## 2019-12-20 VITALS
BODY MASS INDEX: 26.48 KG/M2 | TEMPERATURE: 97.6 F | RESPIRATION RATE: 16 BRPM | DIASTOLIC BLOOD PRESSURE: 68 MMHG | HEART RATE: 64 BPM | WEIGHT: 185 LBS | OXYGEN SATURATION: 94 % | SYSTOLIC BLOOD PRESSURE: 140 MMHG | HEIGHT: 70 IN

## 2019-12-20 DIAGNOSIS — N40.0 BENIGN PROSTATIC HYPERPLASIA WITHOUT LOWER URINARY TRACT SYMPMS: ICD-10-CM

## 2019-12-20 DIAGNOSIS — Z86.79 PERSONAL HISTORY OF OTHER DISEASES OF THE CIRCULATORY SYSTEM: ICD-10-CM

## 2019-12-20 PROCEDURE — 99214 OFFICE O/P EST MOD 30 MIN: CPT | Mod: 25

## 2019-12-20 PROCEDURE — 36415 COLL VENOUS BLD VENIPUNCTURE: CPT

## 2019-12-20 NOTE — COUNSELING
[Fall prevention counseling provided] : Fall prevention counseling provided [No throw rugs] : No throw rugs [Adequate lighting] : Adequate lighting [Use proper foot wear] : Use proper foot wear [Behavioral health counseling provided] : Behavioral health counseling provided [Engage in a relaxing activity] : Engage in a relaxing activity [Sleep ___ hours/day] : Sleep [unfilled] hours/day [AUDIT-C Screening administered and reviewed] : AUDIT-C Screening administered and reviewed [Plan in advance] : Plan in advance [None] : None [Good understanding] : Patient has a good understanding of lifestyle changes and steps needed to achieve self management goal

## 2019-12-20 NOTE — HISTORY OF PRESENT ILLNESS
[de-identified] : This 85-year-old gentleman, who presents today for followup and also for disease management. Patient states it is in his usual state of health. He presently denies any chest pain or shortness of breath.\par \par At this visit. We will be addressing anemia generalized anxiety, benign prostatic hypertrophic chronic renal insufficiency, stage III, hyperlipidemia, and hypertension.\par \par Comprehensive blood work will be obtained.\par \par Patient is awake, alert, and oriented x3 in no acute distress, calm and cooperative, hard of hearing. [FreeTextEntry1] : Please see history of present illness

## 2019-12-20 NOTE — HEALTH RISK ASSESSMENT
[Yes] : Yes [4 or more  times a week (4 pts)] : 4 or more  times a week (4 points) [No] : In the past 12 months have you used drugs other than those required for medical reasons? No [1 or 2 (0 pts)] : 1 or 2 (0 points) [Never (0 pts)] : Never (0 points) [No falls in past year] : Patient reported no falls in the past year [0] : 1) Little interest or pleasure doing things: Not at all (0) [Audit-CScore] : 4 [] : No [[id number]] : 0

## 2019-12-20 NOTE — REVIEW OF SYSTEMS
[Negative] : Heme/Lymph [Headache] : no headache [Dizziness] : no dizziness [Fainting] : no fainting [Confusion] : no confusion [Unsteady Walk] : no ataxia [de-identified] : tremor [Memory Loss] : no memory loss

## 2019-12-20 NOTE — ASSESSMENT
[FreeTextEntry1] : Assessment and plan:\par \par 1. Anxiety disorder. Patient takes alprazolam, very sparingly. I stopped checked. There is no sign of abuse. Patient takes medications exactly as prescribed.\par \par 2. Hypertension. Continue amlodipine 5 mg p.o. daily.\par \par 3. Hyperlipidemia. Discussed with patient low fat, low cholesterol diet. Continue lovastatin 40 mg at bedtime.\par \par 4. Essential tremor pretty well controlled with propranolol 60 mg twice a day.\par \par 5. BPH. Continue Terazolsin 10 mg at bedtime\par \par 6. Gastroesophageal reflux well controlled with proton pump inhibitor.\par \par 7. Reviewed health maintenance issues with patient. Up-to-date with vaccinations.\par \par 8. Chronic renal insufficiency. Check comprehensive blood work, which was drawn in office by examiner.

## 2019-12-20 NOTE — PHYSICAL EXAM
[Well Nourished] : well nourished [No Acute Distress] : no acute distress [Well-Appearing] : well-appearing [Well Developed] : well developed [Normal Voice/Communication] : normal voice/communication [PERRL] : pupils equal round and reactive to light [EOMI] : extraocular movements intact [Normal Sclera/Conjunctiva] : normal sclera/conjunctiva [Normal TMs] : both tympanic membranes were normal [Normal Outer Ear/Nose] : the outer ears and nose were normal in appearance [Normal Oropharynx] : the oropharynx was normal [No JVD] : no jugular venous distention [No Lymphadenopathy] : no lymphadenopathy [Normal Nasal Mucosa] : the nasal mucosa was normal [No Respiratory Distress] : no respiratory distress  [Thyroid Normal, No Nodules] : the thyroid was normal and there were no nodules present [Supple] : supple [No Accessory Muscle Use] : no accessory muscle use [Normal Rate] : normal rate  [Regular Rhythm] : with a regular rhythm [Clear to Auscultation] : lungs were clear to auscultation bilaterally [No Murmur] : no murmur heard [Normal S1, S2] : normal S1 and S2 [No Carotid Bruits] : no carotid bruits [No Abdominal Bruit] : a ~M bruit was not heard ~T in the abdomen [No Varicosities] : no varicosities [Pedal Pulses Present] : the pedal pulses are present [No Edema] : there was no peripheral edema [No Palpable Aorta] : no palpable aorta [No Extremity Clubbing/Cyanosis] : no extremity clubbing/cyanosis [Soft] : abdomen soft [Non-distended] : non-distended [Non Tender] : non-tender [No Masses] : no abdominal mass palpated [No HSM] : no HSM [Normal Bowel Sounds] : normal bowel sounds [Normal Posterior Cervical Nodes] : no posterior cervical lymphadenopathy [Normal Supraclavicular Nodes] : no supraclavicular lymphadenopathy [No CVA Tenderness] : no CVA  tenderness [Normal Anterior Cervical Nodes] : no anterior cervical lymphadenopathy [No Spinal Tenderness] : no spinal tenderness [No Joint Swelling] : no joint swelling [Grossly Normal Strength/Tone] : grossly normal strength/tone [No Rash] : no rash [No Skin Lesions] : no skin lesions [No Focal Deficits] : no focal deficits [Coordination Grossly Intact] : coordination grossly intact [Normal Gait] : normal gait [Deep Tendon Reflexes (DTR)] : deep tendon reflexes were 2+ and symmetric [Speech Grossly Normal] : speech grossly normal [Memory Grossly Normal] : memory grossly normal [Normal Affect] : the affect was normal [Alert and Oriented x3] : oriented to person, place, and time [Normal Mood] : the mood was normal [Normal Insight/Judgement] : insight and judgment were intact [de-identified] : tremor

## 2019-12-21 LAB
24R-OH-CALCIDIOL SERPL-MCNC: 38.9 PG/ML
25(OH)D3 SERPL-MCNC: 26.8 NG/ML
ALBUMIN SERPL ELPH-MCNC: 4 G/DL
ALP BLD-CCNC: 68 U/L
ALT SERPL-CCNC: 16 U/L
ANION GAP SERPL CALC-SCNC: 16 MMOL/L
AST SERPL-CCNC: 14 U/L
BASOPHILS # BLD AUTO: 0.07 K/UL
BASOPHILS NFR BLD AUTO: 1 %
BILIRUB SERPL-MCNC: 0.2 MG/DL
BUN SERPL-MCNC: 24 MG/DL
CALCIUM SERPL-MCNC: 9.4 MG/DL
CHLORIDE SERPL-SCNC: 106 MMOL/L
CHOLEST SERPL-MCNC: 192 MG/DL
CHOLEST/HDLC SERPL: 3.9 RATIO
CO2 SERPL-SCNC: 22 MMOL/L
CREAT SERPL-MCNC: 1.62 MG/DL
EOSINOPHIL # BLD AUTO: 0.25 K/UL
EOSINOPHIL NFR BLD AUTO: 3.7 %
ESTIMATED AVERAGE GLUCOSE: 120 MG/DL
GLUCOSE SERPL-MCNC: 147 MG/DL
HBA1C MFR BLD HPLC: 5.8 %
HCT VFR BLD CALC: 42.1 %
HDLC SERPL-MCNC: 49 MG/DL
HGB BLD-MCNC: 13.1 G/DL
IMM GRANULOCYTES NFR BLD AUTO: 0.1 %
LDLC SERPL CALC-MCNC: 83 MG/DL
LYMPHOCYTES # BLD AUTO: 1.47 K/UL
LYMPHOCYTES NFR BLD AUTO: 21.9 %
MAN DIFF?: NORMAL
MCHC RBC-ENTMCNC: 28.6 PG
MCHC RBC-ENTMCNC: 31.1 GM/DL
MCV RBC AUTO: 91.9 FL
MONOCYTES # BLD AUTO: 0.62 K/UL
MONOCYTES NFR BLD AUTO: 9.3 %
NEUTROPHILS # BLD AUTO: 4.28 K/UL
NEUTROPHILS NFR BLD AUTO: 64 %
PLATELET # BLD AUTO: 234 K/UL
POTASSIUM SERPL-SCNC: 4.5 MMOL/L
PROT SERPL-MCNC: 6.5 G/DL
PSA SERPL-MCNC: 0.07 NG/ML
RBC # BLD: 4.58 M/UL
RBC # FLD: 14.5 %
SODIUM SERPL-SCNC: 144 MMOL/L
T4 FREE SERPL-MCNC: 1.4 NG/DL
TRIGL SERPL-MCNC: 301 MG/DL
TSH SERPL-ACNC: 2.7 UIU/ML
WBC # FLD AUTO: 6.7 K/UL

## 2020-02-18 ENCOUNTER — APPOINTMENT (OUTPATIENT)
Dept: NEPHROLOGY | Facility: CLINIC | Age: 85
End: 2020-02-18
Payer: MEDICARE

## 2020-02-18 VITALS
HEART RATE: 65 BPM | DIASTOLIC BLOOD PRESSURE: 67 MMHG | BODY MASS INDEX: 26.42 KG/M2 | HEIGHT: 70 IN | SYSTOLIC BLOOD PRESSURE: 168 MMHG | OXYGEN SATURATION: 95 % | WEIGHT: 184.52 LBS

## 2020-02-18 VITALS — SYSTOLIC BLOOD PRESSURE: 140 MMHG | HEART RATE: 64 BPM | DIASTOLIC BLOOD PRESSURE: 70 MMHG

## 2020-02-18 PROCEDURE — 99213 OFFICE O/P EST LOW 20 MIN: CPT

## 2020-02-18 NOTE — HISTORY OF PRESENT ILLNESS
[FreeTextEntry1] : 86 year old male with history of hypertension, prostate cancer s/p seeds, CVA, osteoarthritis had used NSAIDs chronically in past, essential tremor who presents for follow up of CKD3\par Denies any new issues\par

## 2020-02-18 NOTE — ASSESSMENT
[FreeTextEntry1] : 86 year old M with history of HTN, history of chronic meloxicam use, with CKD3\par CKD stage 3: Stable renal function GFR ranges between 35-40\par Hypertension : controlled\par Continue terazosin and propranolol\par Anemia : stable\par Follow up in 6 months

## 2020-02-18 NOTE — PHYSICAL EXAM
[General Appearance - In No Acute Distress] : in no acute distress [General Appearance - Alert] : alert [Outer Ear] : the ears and nose were normal in appearance [Sclera] : the sclera and conjunctiva were normal [Neck Appearance] : the appearance of the neck was normal [Heart Rate And Rhythm] : heart rate was normal and rhythm regular [Heart Sounds] : normal S1 and S2 [Auscultation Breath Sounds / Voice Sounds] : lungs were clear to auscultation bilaterally [Heart Sounds Pericardial Friction Rub] : no pericardial rub [Edema] : there was no peripheral edema [Murmurs] : no murmurs [Abdomen Soft] : soft [Bowel Sounds] : normal bowel sounds [] : no rash [No Focal Deficits] : no focal deficits [No CVA Tenderness] : no ~M costovertebral angle tenderness [Mood] : the mood was normal [Oriented To Time, Place, And Person] : oriented to person, place, and time [Affect] : the affect was normal

## 2020-04-13 ENCOUNTER — RX RENEWAL (OUTPATIENT)
Age: 85
End: 2020-04-13

## 2020-05-26 ENCOUNTER — APPOINTMENT (OUTPATIENT)
Dept: FAMILY MEDICINE | Facility: CLINIC | Age: 85
End: 2020-05-26

## 2020-05-27 ENCOUNTER — APPOINTMENT (OUTPATIENT)
Dept: FAMILY MEDICINE | Facility: CLINIC | Age: 85
End: 2020-05-27
Payer: MEDICARE

## 2020-05-27 DIAGNOSIS — E87.6 HYPOKALEMIA: ICD-10-CM

## 2020-05-27 DIAGNOSIS — R60.0 LOCALIZED EDEMA: ICD-10-CM

## 2020-05-27 PROCEDURE — 99442: CPT

## 2020-07-16 ENCOUNTER — APPOINTMENT (OUTPATIENT)
Dept: FAMILY MEDICINE | Facility: CLINIC | Age: 85
End: 2020-07-16
Payer: MEDICARE

## 2020-07-16 VITALS
OXYGEN SATURATION: 93 % | HEART RATE: 75 BPM | RESPIRATION RATE: 14 BRPM | WEIGHT: 175.06 LBS | SYSTOLIC BLOOD PRESSURE: 119 MMHG | TEMPERATURE: 98 F | BODY MASS INDEX: 25.06 KG/M2 | DIASTOLIC BLOOD PRESSURE: 60 MMHG | HEIGHT: 70 IN

## 2020-07-16 DIAGNOSIS — I10 ESSENTIAL (PRIMARY) HYPERTENSION: ICD-10-CM

## 2020-07-16 DIAGNOSIS — D64.9 ANEMIA, UNSPECIFIED: ICD-10-CM

## 2020-07-16 DIAGNOSIS — Z00.00 ENCOUNTER FOR GENERAL ADULT MEDICAL EXAMINATION W/OUT ABNORMAL FINDINGS: ICD-10-CM

## 2020-07-16 DIAGNOSIS — R25.1 TREMOR, UNSPECIFIED: ICD-10-CM

## 2020-07-16 DIAGNOSIS — E78.5 HYPERLIPIDEMIA, UNSPECIFIED: ICD-10-CM

## 2020-07-16 DIAGNOSIS — F41.9 ANXIETY DISORDER, UNSPECIFIED: ICD-10-CM

## 2020-07-16 PROCEDURE — 36415 COLL VENOUS BLD VENIPUNCTURE: CPT

## 2020-07-16 PROCEDURE — 99214 OFFICE O/P EST MOD 30 MIN: CPT | Mod: 25

## 2020-07-16 NOTE — HEALTH RISK ASSESSMENT
[Yes] : Yes [] : No [2 - 3 times a week (3 pts)] : 2 - 3  times a week (3 points) [1 or 2 (0 pts)] : 1 or 2 (0 points) [No falls in past year] : Patient reported no falls in the past year [Never (0 pts)] : Never (0 points) [No] : In the past 12 months have you used drugs other than those required for medical reasons? No [0] : 2) Feeling down, depressed, or hopeless: Not at all (0) [Audit-CScore] : 3 [[id number]] : 0

## 2020-07-16 NOTE — COUNSELING
[Fall prevention counseling provided] : Fall prevention counseling provided [Adequate lighting] : Adequate lighting [Use proper foot wear] : Use proper foot wear [No throw rugs] : No throw rugs [Sleep ___ hours/day] : Sleep [unfilled] hours/day [Behavioral health counseling provided] : Behavioral health counseling provided [Engage in a relaxing activity] : Engage in a relaxing activity [Plan in advance] : Plan in advance [None] : None [AUDIT-C Screening administered and reviewed] : AUDIT-C Screening administered and reviewed [Good understanding] : Patient has a good understanding of lifestyle changes and steps needed to achieve self management goal

## 2020-07-16 NOTE — HISTORY OF PRESENT ILLNESS
[FreeTextEntry1] : See HPI [de-identified] :  86-year-old gentleman who presents today for follow-up and disease management patient states that he is in his usual state of health we will be addressing at today's visit anemia, generalized anxiety, chronic renal insufficiency stage III which has been stable, hyperlipidemia, hypertension, peripheral vascular disease which is stable and patient doing well and tremor.\par \par Patient denies any chest pain shortness of breath nausea or vomiting in fact he states that he is feeling well.

## 2020-07-16 NOTE — ASSESSMENT
[FreeTextEntry1] : Assessment and plan:\par \par 1.  Hypertension excellent blood pressure control with amlodipine 5 mg p.o. daily.\par \par 2.  Generalized anxiety patient takes medications very sparingly and only as needed we will continue alprazolam 0.25 mg as needed I stop checked there is no sign of abuse.\par \par 3.  Hyperlipidemia patient tolerating and doing well with lovastatin 40 mg p.o. daily at bedtime comprehensive blood work was drawn in office by examiner.\par \par 4.  Essential tremor continue propranolol 60 mg p.o. twice a day\par \par 5.  Gastroesophageal reflux well controlled with omeprazole 20 mg daily\par \par 6.  No change in present medical management continue all medications as prescribed comprehensive blood work drawn in office.\par \par \par \par

## 2020-07-16 NOTE — REVIEW OF SYSTEMS
[Headache] : no headache [Dizziness] : no dizziness [Fainting] : no fainting [Memory Loss] : no memory loss [Unsteady Walk] : no ataxia [Confusion] : no confusion [Negative] : Heme/Lymph [de-identified] : tremor

## 2020-07-17 LAB
24R-OH-CALCIDIOL SERPL-MCNC: 45.4 PG/ML
25(OH)D3 SERPL-MCNC: 37 NG/ML
ALBUMIN SERPL ELPH-MCNC: 4.2 G/DL
ALP BLD-CCNC: 60 U/L
ALT SERPL-CCNC: 10 U/L
ANION GAP SERPL CALC-SCNC: 16 MMOL/L
AST SERPL-CCNC: 9 U/L
BASOPHILS # BLD AUTO: 0.11 K/UL
BASOPHILS NFR BLD AUTO: 1.3 %
BILIRUB SERPL-MCNC: 0.4 MG/DL
BUN SERPL-MCNC: 26 MG/DL
CALCIUM SERPL-MCNC: 9.5 MG/DL
CHLORIDE SERPL-SCNC: 104 MMOL/L
CHOLEST SERPL-MCNC: 157 MG/DL
CHOLEST/HDLC SERPL: 3.4 RATIO
CO2 SERPL-SCNC: 22 MMOL/L
CREAT SERPL-MCNC: 2.01 MG/DL
EOSINOPHIL # BLD AUTO: 0.32 K/UL
EOSINOPHIL NFR BLD AUTO: 3.7 %
ESTIMATED AVERAGE GLUCOSE: 128 MG/DL
FERRITIN SERPL-MCNC: 21 NG/ML
FOLATE SERPL-MCNC: >20 NG/ML
GLUCOSE SERPL-MCNC: 111 MG/DL
HBA1C MFR BLD HPLC: 6.1 %
HCT VFR BLD CALC: 40.6 %
HDLC SERPL-MCNC: 46 MG/DL
HGB BLD-MCNC: 12.2 G/DL
IMM GRANULOCYTES NFR BLD AUTO: 0.1 %
IRON SATN MFR SERPL: 12 %
IRON SERPL-MCNC: 46 UG/DL
LDLC SERPL CALC-MCNC: 79 MG/DL
LYMPHOCYTES # BLD AUTO: 1.6 K/UL
LYMPHOCYTES NFR BLD AUTO: 18.6 %
MAN DIFF?: NORMAL
MCHC RBC-ENTMCNC: 25.9 PG
MCHC RBC-ENTMCNC: 30 GM/DL
MCV RBC AUTO: 86.2 FL
MONOCYTES # BLD AUTO: 0.85 K/UL
MONOCYTES NFR BLD AUTO: 9.9 %
NEUTROPHILS # BLD AUTO: 5.69 K/UL
NEUTROPHILS NFR BLD AUTO: 66.4 %
PLATELET # BLD AUTO: 263 K/UL
POTASSIUM SERPL-SCNC: 5 MMOL/L
PROT SERPL-MCNC: 6.8 G/DL
RBC # BLD: 4.71 M/UL
RBC # FLD: 15.5 %
SARS-COV-2 IGG SERPL IA-ACNC: <3.8 AU/ML
SARS-COV-2 IGG SERPL QL IA: NEGATIVE
SODIUM SERPL-SCNC: 142 MMOL/L
TIBC SERPL-MCNC: 385 UG/DL
TRIGL SERPL-MCNC: 161 MG/DL
UIBC SERPL-MCNC: 338 UG/DL
VIT B12 SERPL-MCNC: 534 PG/ML
WBC # FLD AUTO: 8.58 K/UL

## 2020-08-07 ENCOUNTER — RX RENEWAL (OUTPATIENT)
Age: 85
End: 2020-08-07

## 2020-08-10 ENCOUNTER — APPOINTMENT (OUTPATIENT)
Dept: NEPHROLOGY | Facility: CLINIC | Age: 85
End: 2020-08-10

## 2020-08-11 ENCOUNTER — RX RENEWAL (OUTPATIENT)
Age: 85
End: 2020-08-11

## 2020-08-11 RX ORDER — AMLODIPINE BESYLATE 5 MG/1
5 TABLET ORAL DAILY
Qty: 90 | Refills: 3 | Status: ACTIVE | COMMUNITY
Start: 2019-08-12 | End: 1900-01-01

## 2020-08-14 ENCOUNTER — RX RENEWAL (OUTPATIENT)
Age: 85
End: 2020-08-14

## 2020-08-30 ENCOUNTER — TRANSCRIPTION ENCOUNTER (OUTPATIENT)
Age: 85
End: 2020-08-30

## 2020-08-30 ENCOUNTER — INPATIENT (INPATIENT)
Facility: HOSPITAL | Age: 85
LOS: 1 days | Discharge: ANOTHER HOSPITAL | DRG: 375 | End: 2020-09-01
Attending: INTERNAL MEDICINE | Admitting: STUDENT IN AN ORGANIZED HEALTH CARE EDUCATION/TRAINING PROGRAM
Payer: COMMERCIAL

## 2020-08-30 VITALS
WEIGHT: 175.05 LBS | TEMPERATURE: 98 F | OXYGEN SATURATION: 100 % | RESPIRATION RATE: 16 BRPM | HEIGHT: 69 IN | SYSTOLIC BLOOD PRESSURE: 145 MMHG | HEART RATE: 102 BPM | DIASTOLIC BLOOD PRESSURE: 83 MMHG

## 2020-08-30 DIAGNOSIS — R25.1 TREMOR, UNSPECIFIED: ICD-10-CM

## 2020-08-30 DIAGNOSIS — K22.2 ESOPHAGEAL OBSTRUCTION: ICD-10-CM

## 2020-08-30 DIAGNOSIS — Z71.89 OTHER SPECIFIED COUNSELING: ICD-10-CM

## 2020-08-30 DIAGNOSIS — K21.9 GASTRO-ESOPHAGEAL REFLUX DISEASE WITHOUT ESOPHAGITIS: ICD-10-CM

## 2020-08-30 DIAGNOSIS — I10 ESSENTIAL (PRIMARY) HYPERTENSION: ICD-10-CM

## 2020-08-30 DIAGNOSIS — N17.9 ACUTE KIDNEY FAILURE, UNSPECIFIED: ICD-10-CM

## 2020-08-30 DIAGNOSIS — C61 MALIGNANT NEOPLASM OF PROSTATE: ICD-10-CM

## 2020-08-30 DIAGNOSIS — E78.5 HYPERLIPIDEMIA, UNSPECIFIED: ICD-10-CM

## 2020-08-30 LAB
ALBUMIN SERPL ELPH-MCNC: 3.5 G/DL — SIGNIFICANT CHANGE UP (ref 3.3–5)
ALP SERPL-CCNC: 61 U/L — SIGNIFICANT CHANGE UP (ref 40–120)
ALT FLD-CCNC: 16 U/L — SIGNIFICANT CHANGE UP (ref 10–45)
ANION GAP SERPL CALC-SCNC: 8 MMOL/L — SIGNIFICANT CHANGE UP (ref 5–17)
APTT BLD: 39.6 SEC — HIGH (ref 27.5–35.5)
AST SERPL-CCNC: 10 U/L — SIGNIFICANT CHANGE UP (ref 10–40)
BASOPHILS # BLD AUTO: 0.07 K/UL — SIGNIFICANT CHANGE UP (ref 0–0.2)
BASOPHILS NFR BLD AUTO: 1 % — SIGNIFICANT CHANGE UP (ref 0–2)
BILIRUB SERPL-MCNC: 0.6 MG/DL — SIGNIFICANT CHANGE UP (ref 0.2–1.2)
BLD GP AB SCN SERPL QL: SIGNIFICANT CHANGE UP
BUN SERPL-MCNC: 19 MG/DL — SIGNIFICANT CHANGE UP (ref 7–23)
CALCIUM SERPL-MCNC: 9.1 MG/DL — SIGNIFICANT CHANGE UP (ref 8.4–10.5)
CHLORIDE SERPL-SCNC: 106 MMOL/L — SIGNIFICANT CHANGE UP (ref 96–108)
CO2 SERPL-SCNC: 28 MMOL/L — SIGNIFICANT CHANGE UP (ref 22–31)
CREAT SERPL-MCNC: 1.62 MG/DL — HIGH (ref 0.5–1.3)
EOSINOPHIL # BLD AUTO: 0.25 K/UL — SIGNIFICANT CHANGE UP (ref 0–0.5)
EOSINOPHIL NFR BLD AUTO: 3.6 % — SIGNIFICANT CHANGE UP (ref 0–6)
GLUCOSE SERPL-MCNC: 126 MG/DL — HIGH (ref 70–99)
HCT VFR BLD CALC: 41.8 % — SIGNIFICANT CHANGE UP (ref 39–50)
HGB BLD-MCNC: 12.8 G/DL — LOW (ref 13–17)
IMM GRANULOCYTES NFR BLD AUTO: 0.1 % — SIGNIFICANT CHANGE UP (ref 0–1.5)
INR BLD: 1.1 RATIO — SIGNIFICANT CHANGE UP (ref 0.88–1.16)
LIDOCAIN IGE QN: 81 U/L — SIGNIFICANT CHANGE UP (ref 73–393)
LYMPHOCYTES # BLD AUTO: 1.23 K/UL — SIGNIFICANT CHANGE UP (ref 1–3.3)
LYMPHOCYTES # BLD AUTO: 17.5 % — SIGNIFICANT CHANGE UP (ref 13–44)
MCHC RBC-ENTMCNC: 25.6 PG — LOW (ref 27–34)
MCHC RBC-ENTMCNC: 30.6 GM/DL — LOW (ref 32–36)
MCV RBC AUTO: 83.6 FL — SIGNIFICANT CHANGE UP (ref 80–100)
MONOCYTES # BLD AUTO: 0.68 K/UL — SIGNIFICANT CHANGE UP (ref 0–0.9)
MONOCYTES NFR BLD AUTO: 9.7 % — SIGNIFICANT CHANGE UP (ref 2–14)
NEUTROPHILS # BLD AUTO: 4.77 K/UL — SIGNIFICANT CHANGE UP (ref 1.8–7.4)
NEUTROPHILS NFR BLD AUTO: 68.1 % — SIGNIFICANT CHANGE UP (ref 43–77)
NRBC # BLD: 0 /100 WBCS — SIGNIFICANT CHANGE UP (ref 0–0)
PLATELET # BLD AUTO: 212 K/UL — SIGNIFICANT CHANGE UP (ref 150–400)
POTASSIUM SERPL-MCNC: 4.5 MMOL/L — SIGNIFICANT CHANGE UP (ref 3.5–5.3)
POTASSIUM SERPL-SCNC: 4.5 MMOL/L — SIGNIFICANT CHANGE UP (ref 3.5–5.3)
PROT SERPL-MCNC: 7.2 G/DL — SIGNIFICANT CHANGE UP (ref 6–8.3)
PROTHROM AB SERPL-ACNC: 13.3 SEC — SIGNIFICANT CHANGE UP (ref 10.6–13.6)
RBC # BLD: 5 M/UL — SIGNIFICANT CHANGE UP (ref 4.2–5.8)
RBC # FLD: 15.8 % — HIGH (ref 10.3–14.5)
SARS-COV-2 IGG SERPL QL IA: NEGATIVE — SIGNIFICANT CHANGE UP
SARS-COV-2 IGM SERPL IA-ACNC: 0.08 INDEX — SIGNIFICANT CHANGE UP
SARS-COV-2 RNA SPEC QL NAA+PROBE: SIGNIFICANT CHANGE UP
SODIUM SERPL-SCNC: 142 MMOL/L — SIGNIFICANT CHANGE UP (ref 135–145)
TROPONIN I SERPL-MCNC: <.017 NG/ML — LOW (ref 0.02–0.06)
WBC # BLD: 7.01 K/UL — SIGNIFICANT CHANGE UP (ref 3.8–10.5)
WBC # FLD AUTO: 7.01 K/UL — SIGNIFICANT CHANGE UP (ref 3.8–10.5)

## 2020-08-30 PROCEDURE — 71250 CT THORAX DX C-: CPT | Mod: 26

## 2020-08-30 PROCEDURE — 99285 EMERGENCY DEPT VISIT HI MDM: CPT

## 2020-08-30 PROCEDURE — 99222 1ST HOSP IP/OBS MODERATE 55: CPT

## 2020-08-30 RX ORDER — ONDANSETRON 8 MG/1
4 TABLET, FILM COATED ORAL EVERY 6 HOURS
Refills: 0 | Status: DISCONTINUED | OUTPATIENT
Start: 2020-08-30 | End: 2020-09-01

## 2020-08-30 RX ORDER — SODIUM CHLORIDE 9 MG/ML
1000 INJECTION, SOLUTION INTRAVENOUS
Refills: 0 | Status: DISCONTINUED | OUTPATIENT
Start: 2020-08-30 | End: 2020-08-31

## 2020-08-30 RX ORDER — FAMOTIDINE 10 MG/ML
20 INJECTION INTRAVENOUS DAILY
Refills: 0 | Status: DISCONTINUED | OUTPATIENT
Start: 2020-08-30 | End: 2020-08-30

## 2020-08-30 RX ORDER — HEPARIN SODIUM 5000 [USP'U]/ML
5000 INJECTION INTRAVENOUS; SUBCUTANEOUS EVERY 12 HOURS
Refills: 0 | Status: DISCONTINUED | OUTPATIENT
Start: 2020-08-30 | End: 2020-09-01

## 2020-08-30 RX ORDER — SODIUM CHLORIDE 9 MG/ML
500 INJECTION INTRAMUSCULAR; INTRAVENOUS; SUBCUTANEOUS ONCE
Refills: 0 | Status: COMPLETED | OUTPATIENT
Start: 2020-08-30 | End: 2020-08-30

## 2020-08-30 RX ORDER — LABETALOL HCL 100 MG
5 TABLET ORAL
Refills: 0 | Status: DISCONTINUED | OUTPATIENT
Start: 2020-08-30 | End: 2020-08-31

## 2020-08-30 RX ORDER — FAMOTIDINE 10 MG/ML
20 INJECTION INTRAVENOUS DAILY
Refills: 0 | Status: DISCONTINUED | OUTPATIENT
Start: 2020-08-30 | End: 2020-08-31

## 2020-08-30 RX ADMIN — Medication 5 MILLIGRAM(S): at 18:30

## 2020-08-30 RX ADMIN — SODIUM CHLORIDE 500 MILLILITER(S): 9 INJECTION INTRAMUSCULAR; INTRAVENOUS; SUBCUTANEOUS at 07:50

## 2020-08-30 RX ADMIN — HEPARIN SODIUM 5000 UNIT(S): 5000 INJECTION INTRAVENOUS; SUBCUTANEOUS at 18:32

## 2020-08-30 RX ADMIN — Medication 1 MILLIGRAM(S): at 12:44

## 2020-08-30 RX ADMIN — Medication 1 MILLIGRAM(S): at 21:14

## 2020-08-30 RX ADMIN — SODIUM CHLORIDE 500 MILLILITER(S): 9 INJECTION INTRAMUSCULAR; INTRAVENOUS; SUBCUTANEOUS at 08:50

## 2020-08-30 RX ADMIN — SODIUM CHLORIDE 80 MILLILITER(S): 9 INJECTION, SOLUTION INTRAVENOUS at 12:48

## 2020-08-30 NOTE — ED ADULT NURSE NOTE - NSIMPLEMENTINTERV_GEN_ALL_ED
Implemented All Universal Safety Interventions:  [place] to call system. Call bell, personal items and telephone within reach. Instruct patient to call for assistance. Room bathroom lighting operational. Non-slip footwear when patient is off stretcher. Physically safe environment: no spills, clutter or unnecessary equipment. Stretcher in lowest position, wheels locked, appropriate side rails in place.

## 2020-08-30 NOTE — PATIENT PROFILE ADULT - NSPROPTRIGHTSUPPORTPERSON_GEN_A_NUR
Received report from RENAE Malik.   Pt is resting in stretcher, in no acute distress, respirations even and non labored.   Restroom and comfort needs addressed.   Will continue to monitor.    declines

## 2020-08-30 NOTE — CONSULT NOTE ADULT - ASSESSMENT
86 year old with a history of essential tremor, prostate CA, repaired carotid stenosis anxiety hypertension, and GERD comes with approx. 2 week history of difficulty swallowing and excessive saliva (with copious amounts of mucus as per pt wife). Patient states that after trying to eat roast beef last night for dinner, he had immediate feeling of throwing up, which he did. Pt also states that throughout the entire evening into the night, he was not able to keep anything down not even water. He states he also a tremendous amount of mucus come up with the food.   Pt states approximately 2 weeks ago he had a similar episode of difficulty passing the food, he did develop chest pain at that time.  He feels the food passed and the chest pain resolved.  Pt also states that over the past 2 weeks, at any time he ate or drank fluids, he would feel uncomfortable in his chest.  He says that uncomfortable feeling was tolerable, which is why he didn't seek medical care.  He denies any chest pain currently.  Pt has not felt anything makes his symptoms better but food makes the pain worse.    Patient discussed with Dr. Cerna and Dr. Scott Meza, PAC  Dept of Gastroenterology  Cell: [phone]

## 2020-08-30 NOTE — ED ADULT NURSE NOTE - OBJECTIVE STATEMENT
Pt presents to ER with chief complaint of "spitting up mucus". Pt reports that after attempting to eat diner last night, he felt like "something was stuck in my throat". Pt states "after I tried to eat dinner, I felt like it was stuck and I kept coughing and spitting up mucus but it was clear, I havent eaten since last night".

## 2020-08-30 NOTE — ED PROVIDER NOTE - PRIOR EKG STATUS
HIE checked. No notes that demonstrate prior ekg findings./there is no prior EKG available for comparison

## 2020-08-30 NOTE — H&P ADULT - NSHPREVIEWOFSYSTEMS_GEN_ALL_CORE
REVIEW OF SYSTEMS:  CONSTITUTIONAL: No fever, weight loss, or fatigue  EYES: No eye pain, visual disturbances, or discharge  ENMT:  No difficulty hearing, tinnitus, vertigo; No sinus or throat pain  NECK: No pain or stiffness  BREASTS: No pain, masses, or nipple discharge  RESPIRATORY: No cough, wheezing, chills or hemoptysis; No shortness of breath  CARDIOVASCULAR: + chest pain (see above), no palpitations, dizziness, +leg swelling  GASTROINTESTINAL: denies abdominal or epigastric pain. No nausea, vomiting, or hematemesis; No diarrhea or constipation. No melena or hematochezia.  GENITOURINARY: No dysuria, frequency, hematuria, or incontinence  NEUROLOGICAL: No headaches, memory loss, loss of strength, numbness, or tremors  SKIN: No itching, burning, rashes, or lesions   LYMPH NODES: No enlarged glands  ENDOCRINE: No heat or cold intolerance; No hair loss  MUSCULOSKELETAL: No joint pain or swelling; No muscle, back, or extremity pain  PSYCHIATRIC: No depression, anxiety, mood swings, or difficulty sleeping  HEME/LYMPH: No easy bruising, or bleeding gums  ALLERY AND IMMUNOLOGIC: No hives or eczema    ALL ROS REVIEWED AND NORMAL EXCEPT AS STATED ABOVE

## 2020-08-30 NOTE — ED PROVIDER NOTE - CARE PLAN
Principal Discharge DX:	Esophageal obstruction  Secondary Diagnosis:	Vomiting without nausea, intractability of vomiting not specified, unspecified vomiting type

## 2020-08-30 NOTE — CONSULT NOTE ADULT - SUBJECTIVE AND OBJECTIVE BOX
HPI:    Allergies    No Known Allergies    Intolerances      MEDICATIONS:  MEDICATIONS  (STANDING):    MEDICATIONS  (PRN):    PAST MEDICAL & SURGICAL HISTORY:    FAMILY HISTORY:    SOCIAL HISTORY:  [place]: [ ] Current, [ ] Former, [ ] Never; Pack Years:  Alcohol:  Illicit Drugs:    REVIEW OF SYSTEMS:  Constitutional: No fever, chills, weight loss, or fatigue  ENMT:  No visual changes; No difficulty hearing, tinnitus, vertigo; No sinus or throat pain  Neck: No pain or stiffness  Respiratory: No cough, wheezing, or hemoptysis; No shortness of breath  Cardiovascular: No chest pain, palpitations, dizziness, orthopnea, PND, or leg swelling  Gastrointestinal: No abdominal or epigastric pain. No  nausea, vomiting, or hematemesis. No diarrhea, constipation, or steatorrhea. No melena or hematochezia  Genitourinary: No dysuria, urinary frequency/hesitancy, or hematuria  Skin: No itching, burning, rashes or lesions   Musculoskeletal: No joint pain or swelling; No muscle, back or extremity pain    Vital Signs Last 24 Hrs  T(C): 36.9 (30 Aug 2020 07:07), Max: 36.9 (30 Aug 2020 07:07)  T(F): 98.4 (30 Aug 2020 07:07), Max: 98.4 (30 Aug 2020 07:07)  HR: 102 (30 Aug 2020 07:07) (102 - 102)  BP: 145/83 (30 Aug 2020 07:07) (145/83 - 145/83)  BP(mean): --  RR: 16 (30 Aug 2020 07:07) (16 - 16)  SpO2: 100% (30 Aug 2020 07:07) (100% - 100%)      PHYSICAL EXAM:    General: Well developed; well nourished; in no acute distress  HEENT: MMM, conjunctiva and sclera clear  Gastrointestinal: Soft, non-tender non-distended; Normal bowel sounds; No rebound or guarding  Extremities: Normal range of motion, No clubbing, cyanosis or edema  Neurological: Alert and oriented x3  Skin: Warm and dry. No obvious rash    LABS:                        12.8   7.01  )-----------( 212      ( 30 Aug 2020 07:50 )             41.8     08-30    142  |  106  |  19  ----------------------------<  126<H>  4.5   |  28  |  1.62<H>    Ca    9.1      30 Aug 2020 07:50    TPro  7.2  /  Alb  3.5  /  TBili  0.6  /  DBili  x   /  AST  10  /  ALT  16  /  AlkPhos  61  08-30        PT/INR - ( 30 Aug 2020 07:50 )   PT: 13.3 sec;   INR: 1.10 ratio         PTT - ( 30 Aug 2020 07:50 )  PTT:39.6 sec    RADIOLOGY & ADDITIONAL STUDIES: HPI:   86 year old with a history of essential tremor, prostate CA, repaired carotid stenosis anxiety hypertension, and GERD comes with approx. 2 week history of difficulty swallowing and excessive saliva (with copious amounts of mucus as per pt wife). Patient states that after trying to eat roast beef last night for dinner, he had immediate feeling of throwing up, which he did. Pt also states that throughout the entire evening into the night, he was not able to keep anything down not even water. He states he also a tremendous amount of mucus come up with the food.   Pt states approximately 2 weeks ago he had a similar episode of difficulty passing the food, he did develop chest pain at that time.  He feels the food passed and the chest pain resolved.  Pt also states that over the past 2 weeks, at any time he ate or drank fluids, he would feel uncomfortable in his chest.  He says that uncomfortable feeling was tolerable, which is why he didn't seek medical care.  He denies any chest pain currently.  Pt has not felt anything makes his symptoms better but food makes the pain worse.  Patient denies any daily gagging, hiccups, hypersalivation, or chocking. Pt does states intermittent retrosternal fullness.       Allergies    No Known Allergies    Intolerances      MEDICATIONS:  MEDICATIONS  (STANDING):    MEDICATIONS  (PRN):    PAST MEDICAL & SURGICAL HISTORY:  Prostate CA  GERD with apnea  Hypertension      REVIEW OF SYSTEMS:  Constitutional: No fever, chills, weight loss, or fatigue  ENMT:  No visual changes; No difficulty hearing, tinnitus, vertigo; No sinus or throat pain  Neck: No pain or stiffness  Respiratory: No cough, wheezing, or hemoptysis; No shortness of breath  Cardiovascular: No chest pain, palpitations, dizziness, orthopnea, PND, or leg swelling  Gastrointestinal: No abdominal or epigastric pain. No  nausea, vomiting, or hematemesis. No diarrhea, constipation, or steatorrhea. No melena or hematochezia  Genitourinary: No dysuria, urinary frequency/hesitancy, or hematuria  Skin: No itching, burning, rashes or lesions   Musculoskeletal: No joint pain or swelling; No muscle, back or extremity pain    Vital Signs Last 24 Hrs  T(C): 36.9 (30 Aug 2020 07:07), Max: 36.9 (30 Aug 2020 07:07)  T(F): 98.4 (30 Aug 2020 07:07), Max: 98.4 (30 Aug 2020 07:07)  HR: 102 (30 Aug 2020 07:07) (102 - 102)  BP: 145/83 (30 Aug 2020 07:07) (145/83 - 145/83)  BP(mean): --  RR: 16 (30 Aug 2020 07:07) (16 - 16)  SpO2: 100% (30 Aug 2020 07:07) (100% - 100%)      PHYSICAL EXAM:    General: Well developed; well nourished; in no acute distress  HEENT: MMM, conjunctiva and sclera clear  Gastrointestinal: Soft, non-tender non-distended; Normal bowel sounds; No rebound or guarding  Extremities: Normal range of motion, No clubbing, cyanosis or edema  Neurological: Alert and oriented x3  Skin: Warm and dry. No obvious rash    LABS:                        12.8   7.01  )-----------( 212      ( 30 Aug 2020 07:50 )             41.8     08-30    142  |  106  |  19  ----------------------------<  126<H>  4.5   |  28  |  1.62<H>    Ca    9.1      30 Aug 2020 07:50    TPro  7.2  /  Alb  3.5  /  TBili  0.6  /  DBili  x   /  AST  10  /  ALT  16  /  AlkPhos  61  08-30    PT/INR - ( 30 Aug 2020 07:50 )   PT: 13.3 sec;   INR: 1.10 ratio    PTT - ( 30 Aug 2020 07:50 )  PTT:39.6 sec    RADIOLOGY & ADDITIONAL STUDIES:     < from: CT Chest No Cont (08.30.20 @ 08:14) >  EXAM:  CT CHEST      PROCEDURE DATE:  08/30/2020        INTERPRETATION:  CLINICAL INFORMATION: Vomiting, concern for esophageal foreign body    COMPARISON: None.    PROCEDURE:  CT of the Chest was performed without intravenous contrast.  Sagittal and coronal reformats were performed.    FINDINGS:    LUNGS AND AIRWAYS: Patent central airways.  Right apical calcified granuloma. No segmental consolidation. No pulmonary edema. Apical pleural parenchymal scarring.  PLEURA: No pleural effusion.  MEDIASTINUM AND NICKIE: Marked thickening of the distal esophagus with more predominant thickening on the right lateral and posterior aspect with narrowing of the esophageal lumen highly concerning for esophageal neoplasm till proven otherwise. Focal debrisseen above this area of thickening.  VESSELS: Atherosclerotic changes of the aorta and coronary vasculature.  HEART: Heart size is normal. No pericardial effusion.  CHEST WALL AND LOWER NECK: Within normal limits.  VISUALIZED UPPER ABDOMEN: Mild bilateral renal atrophy. Left renal cyst. Colonic diverticulosis without diverticulitis.  BONES: Degenerative changes.    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 Recommend upper endoscopy for further evaluation.      < end of copied text >

## 2020-08-30 NOTE — H&P ADULT - PROBLEM SELECTOR PLAN 2
Holding home propranolol and amlodipine due to NPO status   Starting labetalol 5mg IV bid - titrate to bp <140

## 2020-08-30 NOTE — ED PROVIDER NOTE - OBJECTIVE STATEMENT
18
86M presents to the ED reportign that last night after having roast beef, he couldn't finish his meal because he had vomiting. Since then, has been intermittently vomiting mucous. This happened about 2 weeks ago but it self resolved. He points to his mid chest and reports that he feels discomfort in that area. No SOB. No fever. No diarrhea. No difficulty swallowing. PMH anxiety, HTN, GERD, essential tremor, prostate cancer. No alleviating factors.

## 2020-08-30 NOTE — H&P ADULT - NSHPLABSRESULTS_GEN_ALL_CORE
LABS:                        12.8   7.01  )-----------( 212      ( 30 Aug 2020 07:50 )             41.8     08-30    142  |  106  |  19  ----------------------------<  126<H>  4.5   |  28  |  1.62<H>    Ca    9.1      30 Aug 2020 07:50    TPro  7.2  /  Alb  3.5  /  TBili  0.6  /  DBili  x   /  AST  10  /  ALT  16  /  AlkPhos  61  08-30    PT/INR - ( 30 Aug 2020 07:50 )   PT: 13.3 sec;   INR: 1.10 ratio         PTT - ( 30 Aug 2020 07:50 )  PTT:39.6 sec     CAPILLARY BLOOD GLUCOSE                RADIOLOGY & ADDITIONAL TESTS:  < from: CT Chest No Cont (08.30.20 @ 08:14) >    IMPRESSION:  Marked thickening of the mid to distal esophagus with associated luminal narrowing and moderate amount of debris seen above the area of thickening, findings 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 Recommend upper endoscopy for further evaluation.    < end of copied text >      Consultant(s) Notes Reviewed:  [x ] YES  [ ] NO  Care Discussed with Consultants/Other Providers [ x] YES  [ ] NO  Imaging Personally Reviewed:  [ ] YES  [ ] NO

## 2020-08-30 NOTE — H&P ADULT - PROBLEM SELECTOR PLAN 6
Status post treatment   Unable to continue home terazosin due to NPO status  Monitor for urinary retention

## 2020-08-30 NOTE — CONSULT NOTE ADULT - ATTENDING COMMENTS
Patient seen and examined.  Agree with assessment and plan as above.    Elderly male with multiple medical problems admitted with dysphagia, mostly to solids.  Imaging suggests esophageal mass lesion.  No abdominal pain.    VSS.  Abdomen soft, nontender BS+    EGD planned for further evaluation

## 2020-08-30 NOTE — ED ADULT NURSE NOTE - CHPI ED NUR SYMPTOMS NEG
no chills/no bleeding gums/no numbness/no vomiting/no weakness/no fever/no loss of consciousness/no syncope

## 2020-08-30 NOTE — ED PROVIDER NOTE - NSCAREINITIATED _GEN_ER
How Severe Is Your Skin Lesion?: mild Has Your Skin Lesion Been Treated?: not been treated Is This A New Presentation, Or A Follow-Up?: Skin Lesion Katarzyna Mullen(Attending)

## 2020-08-30 NOTE — H&P ADULT - ASSESSMENT
86 year old with a history of essential tremor, prostate CA, repaired carotid stenosis anxiety hypertension, and GERD comes with a 1.5 week history of difficulty swallowing and excessive saliva.  Mr. Junior states he ate a meal 1.5 weeks ago and had difficulty passing the food, he did develop chest pain at that time.  He feels the food passed and the chest pain resolved.  Over the last week he had so similar difficulties but last night ate roast beef and feels the food has not passed.  He denies any chest pain currently.  Has not felt anything made his symptoms better but food makes the pain worse.    IMPROVE VTE Individual Risk Assessment    RISK                                                                Points    [  ] Previous VTE                                                  3    [  ] Thrombophilia                                               2    [  ] Lower limb paralysis                                      2        (unable to hold up >15 seconds)      [  ] Current Cancer                                              2         (within 6 months)    [x  ] Immobilization > 24 hrs                                1    [  ] ICU/CCU stay > 24 hours                              1    [ x ] Age > 60                                                      1    IMPROVE VTE Score ____2-heparin_____    IMPROVE Score 0-1: Low Risk, No VTE prophylaxis required for most patients, encourage ambulation.   IMPROVE Score 2-3: At risk, pharmacologic VTE prophylaxis is indicated for most patients (in the absence of a contraindication)  IMPROVE Score > or = 4: High Risk, pharmacologic VTE prophylaxis is indicated for most patients (in the absence of a contraindication)

## 2020-08-30 NOTE — ED PROVIDER NOTE - CLINICAL SUMMARY MEDICAL DECISION MAKING FREE TEXT BOX
86M presents to the ED reportign that last night after having roast beef, he couldn't finish his meal because he had vomiting. Since then, has been intermittently vomiting mucous. This happened about 2 weeks ago but it self resolved. He points to his mid chest and reports that he feels discomfort in that area. No SOB. No fever. No diarrhea. No difficulty swallowing. PMH anxiety, HTN, GERD, essential tremor, prostate cancer. No alleviating factors. Exam as stated. Consider esophageal foreign body. The history is not directly alluding to this so since unclear, will perf ct chest for evaluation.  CT chest demonstrates mass in mid to distal esophagus - consider neoplasm. d/w GI NP who will sched pt for endoscopy tomorrow. Pt and family (wife and granddaughter) aware.

## 2020-08-30 NOTE — H&P ADULT - NSHPSOCIALHISTORY_GEN_ALL_CORE
Lives in private residence with wife  No tobacco use  Has 1 glass of wine a day    Mother  at age 57 from Alzheimer's  Father  at 62 of unknown cause

## 2020-08-30 NOTE — H&P ADULT - NSHPPHYSICALEXAM_GEN_ALL_CORE
Vital Signs Last 24 Hrs  T(F): 98.4 (30 Aug 2020 07:07), Max: 98.4 (30 Aug 2020 07:07)  HR: 102 (30 Aug 2020 07:07) (102 - 102)  BP: 145/83 (30 Aug 2020 07:07) (145/83 - 145/83)  RR: 16 (30 Aug 2020 07:07) (16 - 16)  SpO2: 100% (30 Aug 2020 07:07) (100% - 100%)    PHYSICAL EXAM:  GENERAL: NAD, well-groomed, well-developed  HEAD:  Atraumatic, Normocephalic  EYES: EOMI, conjunctiva and sclera clear  ENMT: Moist mucous membranes, Good dentition, no thrush  NECK: Supple, No JVD  CHEST/LUNG: Clear to auscultation bilaterally, good air entry, non-labored breathing  HEART: RRR; S1/S2, No murmur  ABDOMEN: Soft, Nontender, Nondistended; Bowel sounds present  VASCULAR: Normal pulses, Normal capillary refill  EXTREMITIES: No calf tenderness, No cyanosis, +2 edema at ankles   LYMPH: Normal; No lymphadenopathy noted  SKIN: Warm, Intact  PSYCH: Normal mood, Normal affect  NERVOUS SYSTEM:  A/O x3, Good concentration; CN 2-12 intact, No focal deficits, central tremor present

## 2020-08-30 NOTE — H&P ADULT - HISTORY OF PRESENT ILLNESS
86 year old with a history of essential tremor, prostate CA, repaired carotid stenosis anxiety hypertension, and GERD comes with a 1.5 week history of difficulty swallowing and excessive saliva.  Mr. Junior states he ate a meal 1.5 weeks ago and had difficulty passing the food, he did develop chest pain at that time.  He feels the food passed and the chest pain resolved.  Over the last week he had so similar difficulties but last night ate roast beef and feels the food has not passed.  He denies any chest pain currently.  Has not felt anything made his symptoms better but food makes the pain worse.

## 2020-08-30 NOTE — ED ADULT TRIAGE NOTE - CHIEF COMPLAINT QUOTE
"I feel like I cant keep anything down; when I eat it feels like the food gets stuck and then comes back up and then I start coughing up mucus" Pt denies pain or discomfort on arrival

## 2020-08-31 ENCOUNTER — TRANSCRIPTION ENCOUNTER (OUTPATIENT)
Age: 85
End: 2020-08-31

## 2020-08-31 ENCOUNTER — RESULT REVIEW (OUTPATIENT)
Age: 85
End: 2020-08-31

## 2020-08-31 LAB
ALBUMIN SERPL ELPH-MCNC: 3.2 G/DL — LOW (ref 3.3–5)
ALP SERPL-CCNC: 59 U/L — SIGNIFICANT CHANGE UP (ref 40–120)
ALT FLD-CCNC: 12 U/L — SIGNIFICANT CHANGE UP (ref 10–45)
ANION GAP SERPL CALC-SCNC: 10 MMOL/L — SIGNIFICANT CHANGE UP (ref 5–17)
AST SERPL-CCNC: 13 U/L — SIGNIFICANT CHANGE UP (ref 10–40)
BASOPHILS # BLD AUTO: 0.05 K/UL — SIGNIFICANT CHANGE UP (ref 0–0.2)
BASOPHILS NFR BLD AUTO: 0.7 % — SIGNIFICANT CHANGE UP (ref 0–2)
BILIRUB SERPL-MCNC: 0.6 MG/DL — SIGNIFICANT CHANGE UP (ref 0.2–1.2)
BUN SERPL-MCNC: 13 MG/DL — SIGNIFICANT CHANGE UP (ref 7–23)
CALCIUM SERPL-MCNC: 8.8 MG/DL — SIGNIFICANT CHANGE UP (ref 8.4–10.5)
CHLORIDE SERPL-SCNC: 105 MMOL/L — SIGNIFICANT CHANGE UP (ref 96–108)
CO2 SERPL-SCNC: 26 MMOL/L — SIGNIFICANT CHANGE UP (ref 22–31)
CREAT SERPL-MCNC: 1.41 MG/DL — HIGH (ref 0.5–1.3)
EOSINOPHIL # BLD AUTO: 0.24 K/UL — SIGNIFICANT CHANGE UP (ref 0–0.5)
EOSINOPHIL NFR BLD AUTO: 3.4 % — SIGNIFICANT CHANGE UP (ref 0–6)
GLUCOSE SERPL-MCNC: 116 MG/DL — HIGH (ref 70–99)
HCT VFR BLD CALC: 40.1 % — SIGNIFICANT CHANGE UP (ref 39–50)
HGB BLD-MCNC: 12.5 G/DL — LOW (ref 13–17)
IMM GRANULOCYTES NFR BLD AUTO: 0.3 % — SIGNIFICANT CHANGE UP (ref 0–1.5)
LYMPHOCYTES # BLD AUTO: 1.4 K/UL — SIGNIFICANT CHANGE UP (ref 1–3.3)
LYMPHOCYTES # BLD AUTO: 19.7 % — SIGNIFICANT CHANGE UP (ref 13–44)
MCHC RBC-ENTMCNC: 25.7 PG — LOW (ref 27–34)
MCHC RBC-ENTMCNC: 31.2 GM/DL — LOW (ref 32–36)
MCV RBC AUTO: 82.5 FL — SIGNIFICANT CHANGE UP (ref 80–100)
MONOCYTES # BLD AUTO: 0.83 K/UL — SIGNIFICANT CHANGE UP (ref 0–0.9)
MONOCYTES NFR BLD AUTO: 11.7 % — SIGNIFICANT CHANGE UP (ref 2–14)
NEUTROPHILS # BLD AUTO: 4.56 K/UL — SIGNIFICANT CHANGE UP (ref 1.8–7.4)
NEUTROPHILS NFR BLD AUTO: 64.2 % — SIGNIFICANT CHANGE UP (ref 43–77)
NRBC # BLD: 0 /100 WBCS — SIGNIFICANT CHANGE UP (ref 0–0)
PLATELET # BLD AUTO: 224 K/UL — SIGNIFICANT CHANGE UP (ref 150–400)
POTASSIUM SERPL-MCNC: 3.5 MMOL/L — SIGNIFICANT CHANGE UP (ref 3.5–5.3)
POTASSIUM SERPL-SCNC: 3.5 MMOL/L — SIGNIFICANT CHANGE UP (ref 3.5–5.3)
PROT SERPL-MCNC: 6.8 G/DL — SIGNIFICANT CHANGE UP (ref 6–8.3)
RBC # BLD: 4.86 M/UL — SIGNIFICANT CHANGE UP (ref 4.2–5.8)
RBC # FLD: 15.7 % — HIGH (ref 10.3–14.5)
SODIUM SERPL-SCNC: 141 MMOL/L — SIGNIFICANT CHANGE UP (ref 135–145)
WBC # BLD: 7.1 K/UL — SIGNIFICANT CHANGE UP (ref 3.8–10.5)
WBC # FLD AUTO: 7.1 K/UL — SIGNIFICANT CHANGE UP (ref 3.8–10.5)

## 2020-08-31 PROCEDURE — 99232 SBSQ HOSP IP/OBS MODERATE 35: CPT

## 2020-08-31 PROCEDURE — 88312 SPECIAL STAINS GROUP 1: CPT | Mod: 26

## 2020-08-31 PROCEDURE — 88305 TISSUE EXAM BY PATHOLOGIST: CPT | Mod: 26

## 2020-08-31 RX ORDER — DOXAZOSIN MESYLATE 4 MG
8 TABLET ORAL AT BEDTIME
Refills: 0 | Status: DISCONTINUED | OUTPATIENT
Start: 2020-08-31 | End: 2020-09-01

## 2020-08-31 RX ORDER — TERAZOSIN HYDROCHLORIDE 10 MG/1
0 CAPSULE ORAL
Qty: 0 | Refills: 0 | DISCHARGE

## 2020-08-31 RX ORDER — ONDANSETRON 8 MG/1
4 TABLET, FILM COATED ORAL
Qty: 0 | Refills: 0 | DISCHARGE
Start: 2020-08-31

## 2020-08-31 RX ORDER — AMLODIPINE BESYLATE 2.5 MG/1
5 TABLET ORAL DAILY
Refills: 0 | Status: DISCONTINUED | OUTPATIENT
Start: 2020-08-31 | End: 2020-09-01

## 2020-08-31 RX ORDER — ALPRAZOLAM 0.25 MG
1 TABLET ORAL
Qty: 0 | Refills: 0 | DISCHARGE

## 2020-08-31 RX ORDER — ASPIRIN/CALCIUM CARB/MAGNESIUM 324 MG
1 TABLET ORAL
Qty: 0 | Refills: 0 | DISCHARGE
Start: 2020-08-31

## 2020-08-31 RX ORDER — AMLODIPINE BESYLATE 2.5 MG/1
1 TABLET ORAL
Qty: 0 | Refills: 0 | DISCHARGE
Start: 2020-08-31

## 2020-08-31 RX ORDER — HEPARIN SODIUM 5000 [USP'U]/ML
5000 INJECTION INTRAVENOUS; SUBCUTANEOUS
Qty: 0 | Refills: 0 | DISCHARGE
Start: 2020-08-31

## 2020-08-31 RX ORDER — PANTOPRAZOLE SODIUM 20 MG/1
1 TABLET, DELAYED RELEASE ORAL
Qty: 0 | Refills: 0 | DISCHARGE
Start: 2020-08-31

## 2020-08-31 RX ORDER — PANTOPRAZOLE SODIUM 20 MG/1
40 TABLET, DELAYED RELEASE ORAL
Refills: 0 | Status: DISCONTINUED | OUTPATIENT
Start: 2020-08-31 | End: 2020-09-01

## 2020-08-31 RX ORDER — ATORVASTATIN CALCIUM 80 MG/1
10 TABLET, FILM COATED ORAL AT BEDTIME
Refills: 0 | Status: DISCONTINUED | OUTPATIENT
Start: 2020-08-31 | End: 2020-09-01

## 2020-08-31 RX ORDER — ASPIRIN/CALCIUM CARB/MAGNESIUM 324 MG
1 TABLET ORAL
Qty: 0 | Refills: 0 | DISCHARGE

## 2020-08-31 RX ORDER — DOXAZOSIN MESYLATE 4 MG
1 TABLET ORAL
Qty: 0 | Refills: 0 | DISCHARGE
Start: 2020-08-31

## 2020-08-31 RX ORDER — LABETALOL HCL 100 MG
1 TABLET ORAL
Qty: 0 | Refills: 0 | DISCHARGE
Start: 2020-08-31

## 2020-08-31 RX ORDER — PROPRANOLOL HCL 160 MG
1 CAPSULE, EXTENDED RELEASE 24HR ORAL
Qty: 0 | Refills: 0 | DISCHARGE

## 2020-08-31 RX ORDER — OMEPRAZOLE 10 MG/1
1 CAPSULE, DELAYED RELEASE ORAL
Qty: 0 | Refills: 0 | DISCHARGE

## 2020-08-31 RX ORDER — LOVASTATIN 20 MG
25 TABLET ORAL
Qty: 0 | Refills: 0 | DISCHARGE

## 2020-08-31 RX ORDER — SODIUM CHLORIDE 9 MG/ML
1000 INJECTION, SOLUTION INTRAVENOUS
Qty: 0 | Refills: 0 | DISCHARGE
Start: 2020-08-31

## 2020-08-31 RX ORDER — ASPIRIN/CALCIUM CARB/MAGNESIUM 324 MG
325 TABLET ORAL DAILY
Refills: 0 | Status: DISCONTINUED | OUTPATIENT
Start: 2020-08-31 | End: 2020-09-01

## 2020-08-31 RX ORDER — AMLODIPINE BESYLATE 2.5 MG/1
1 TABLET ORAL
Qty: 0 | Refills: 0 | DISCHARGE

## 2020-08-31 RX ORDER — ATORVASTATIN CALCIUM 80 MG/1
1 TABLET, FILM COATED ORAL
Qty: 0 | Refills: 0 | DISCHARGE
Start: 2020-08-31

## 2020-08-31 RX ORDER — PROPRANOLOL HCL 160 MG
1 CAPSULE, EXTENDED RELEASE 24HR ORAL
Qty: 0 | Refills: 0 | DISCHARGE
Start: 2020-08-31

## 2020-08-31 RX ORDER — FAMOTIDINE 10 MG/ML
2 INJECTION INTRAVENOUS
Qty: 0 | Refills: 0 | DISCHARGE
Start: 2020-08-31

## 2020-08-31 RX ADMIN — SODIUM CHLORIDE 80 MILLILITER(S): 9 INJECTION, SOLUTION INTRAVENOUS at 05:11

## 2020-08-31 RX ADMIN — Medication 325 MILLIGRAM(S): at 18:15

## 2020-08-31 RX ADMIN — Medication 5 MILLIGRAM(S): at 05:10

## 2020-08-31 RX ADMIN — ATORVASTATIN CALCIUM 10 MILLIGRAM(S): 80 TABLET, FILM COATED ORAL at 21:43

## 2020-08-31 RX ADMIN — Medication 8 MILLIGRAM(S): at 21:44

## 2020-08-31 RX ADMIN — HEPARIN SODIUM 5000 UNIT(S): 5000 INJECTION INTRAVENOUS; SUBCUTANEOUS at 05:10

## 2020-08-31 RX ADMIN — HEPARIN SODIUM 5000 UNIT(S): 5000 INJECTION INTRAVENOUS; SUBCUTANEOUS at 18:15

## 2020-08-31 RX ADMIN — Medication 0.25 MILLIGRAM(S): at 21:46

## 2020-08-31 RX ADMIN — AMLODIPINE BESYLATE 5 MILLIGRAM(S): 2.5 TABLET ORAL at 18:15

## 2020-08-31 RX ADMIN — Medication 1 MILLIGRAM(S): at 15:07

## 2020-08-31 RX ADMIN — Medication 1 MILLIGRAM(S): at 05:10

## 2020-08-31 RX ADMIN — FAMOTIDINE 100 MILLIGRAM(S): 10 INJECTION INTRAVENOUS at 14:59

## 2020-08-31 NOTE — PROGRESS NOTE ADULT - SUBJECTIVE AND OBJECTIVE BOX
Patient is a 86y old  Male who presents with a chief complaint of inability to swallow (31 Aug 2020 10:45)    Interval Hx  Patient seen and examined at bedside. No overnight events reported.     ALLERGIES:  No Known Allergies    MEDICATIONS  (STANDING):  dextrose 5% + sodium chloride 0.45%. 1000 milliLiter(s) (80 mL/Hr) IV Continuous <Continuous>  famotidine  IVPB 20 milliGRAM(s) IV Intermittent daily  heparin   Injectable 5000 Unit(s) SubCutaneous every 12 hours  labetalol Injectable 5 milliGRAM(s) IV Push two times a day  LORazepam   Injectable 1 milliGRAM(s) IV Push three times a day    MEDICATIONS  (PRN):  ondansetron Injectable 4 milliGRAM(s) IV Push every 6 hours PRN Nausea and/or Vomiting    Vital Signs Last 24 Hrs  T(F): 98.6 (31 Aug 2020 08:42), Max: 98.6 (31 Aug 2020 08:42)  HR: 98 (31 Aug 2020 08:42) (89 - 98)  BP: 161/80 (31 Aug 2020 08:42) (112/67 - 177/86)  RR: 16 (31 Aug 2020 08:42) (16 - 18)  SpO2: 95% (31 Aug 2020 08:42) (92% - 95%)  I&O's Summary    30 Aug 2020 07:01  -  31 Aug 2020 07:00  --------------------------------------------------------  IN: 0 mL / OUT: 300 mL / NET: -300 mL      PHYSICAL EXAM:  General: NAD  ENT: MMM, no thrush  Neck: Supple, No JVD  Lungs: Clear to auscultation bilaterally, good air entry, non-labored breathing  Cardio: RRR, S1/S2, No murmur  Abdomen: Soft, Nontender, Nondistended; Bowel sounds present  Extremities: No calf tenderness, No pitting edema    LABS:                        12.5   7.10  )-----------( 224      ( 31 Aug 2020 06:22 )             40.1     08-31    141  |  105  |  13  ----------------------------<  116  3.5   |  26  |  1.41    Ca    8.8      31 Aug 2020 06:22    TPro  6.8  /  Alb  3.2  /  TBili  0.6  /  DBili  x   /  AST  13  /  ALT  12  /  AlkPhos  59  08-31      Lipase, Serum: 81 U/L (08-30-20 @ 07:50)    eGFR if Non African American: 45 mL/min/1.73M2 (08-31-20 @ 06:22)  eGFR if African American: 52 mL/min/1.73M2 (08-31-20 @ 06:22)    PT/INR - ( 30 Aug 2020 07:50 )   PT: 13.3 sec;   INR: 1.10 ratio         PTT - ( 30 Aug 2020 07:50 )  PTT:39.6 sec      CARDIAC MARKERS ( 30 Aug 2020 07:50 )  <.017 ng/mL / x     / x     / x     / x            RADIOLOGY & ADDITIONAL TESTS:    Care Discussed with Consultants/Other Providers: Dr. Chavez

## 2020-08-31 NOTE — PROGRESS NOTE ADULT - PROBLEM SELECTOR PLAN 1
- S/P EGD today - Esophageal mass, suspicious for malignancy. Recommend transfer to [facility] for esophageal stent and further work up  - f/u esophageal pathology  - Clear liquid diet  - Continue Pepcid    Patient seen on rounds with Dr Scott Godfrey  Dept of Gastroenterology  Cell: [phone]

## 2020-08-31 NOTE — DISCHARGE NOTE PROVIDER - NSDCMRMEDTOKEN_GEN_ALL_CORE_FT
Dextrose 5% with 0.45% NaCl intravenous solution: 1000 milliliter(s) intravenous   famotidine 10 mg/mL intravenous solution: 2 milliliter(s) intravenous once a day  heparin: 5000 unit(s) subcutaneous 3 times a day  labetalol 5 mg/mL intravenous solution: 1 milliliter(s) intravenous 2 times a day  LORazepam: 1 milligram(s) intravenous 3 times a day  ondansetron 2 mg/mL injectable solution: 4  injectable every 6 hours, As Needed amLODIPine 5 mg oral tablet: 1 tab(s) orally once a day  aspirin 325 mg oral tablet: 1 tab(s) orally once a day  atorvastatin 10 mg oral tablet: 1 tab(s) orally once a day (at bedtime)  Dextrose 5% with 0.45% NaCl intravenous solution: 1000 milliliter(s) intravenous   doxazosin 8 mg oral tablet: 1 tab(s) orally once a day (at bedtime)  heparin: 5000 unit(s) subcutaneous 3 times a day  ondansetron 2 mg/mL injectable solution: 4  injectable every 6 hours, As Needed  pantoprazole 40 mg oral delayed release tablet: 1 tab(s) orally once a day (before a meal)  propranolol 60 mg oral tablet: 1 tab(s) orally 2 times a day

## 2020-08-31 NOTE — DISCHARGE NOTE NURSING/CASE MANAGEMENT/SOCIAL WORK - PATIENT PORTAL LINK FT
You can access the FollowMyHealth Patient Portal offered by [facility] by registering at the following website: [email or web address]. By joining [facility]’s FollowMyHealth portal, you will also be able to view your health information using other applications (apps) compatible with our system.

## 2020-08-31 NOTE — DISCHARGE NOTE PROVIDER - CARE PROVIDER_API CALL
J,   Transfer to [facility] for further workup  Phone: (   )    -  Fax: (   )    -  Follow Up Time: [facility],   Transfer to [facility] for further workup  Phone: (   )    -  Fax: (   )    -  Follow Up Time:     Andres Persaud  MEDICINE - [facility] [place] [address]  Phone: [phone]  Fax: [phone]  Follow Up Time:

## 2020-08-31 NOTE — PROGRESS NOTE ADULT - ASSESSMENT
86 year old with a history of essential tremor, prostate CA, repaired carotid stenosis anxiety hypertension, and GERD comes with approx. 2 week history of difficulty swallowing and excessive saliva (with copious amounts of mucus as per pt wife). Patient stated that after trying to eat roast beef for dinner, he had immediate feeling of throwing up, which he did. Pt also stated that throughout the entire evening into the night, he was not able to keep anything down not even water. Pt stated approximately 2 weeks prior to admission he had a similar episode of difficulty passing the food, he did develop chest pain at that time.  He felt that the food passed and the chest pain resolved.  Pt also stated that over the past 2 weeks, at any time he ate or drank fluids, he would feel uncomfortable in his chest.  He said that uncomfortable feeling was tolerable, which is why he didn't seek medical care. Patient status post EGD today which revealed an esophageal mass, suspicious for malignancy - biopsy sent for pathology.

## 2020-08-31 NOTE — DISCHARGE NOTE PROVIDER - PROVIDER TOKENS
FREE:[LAST:[[facility]],PHONE:[(   )    -],FAX:[(   )    -],ADDRESS:[Transfer to [facility] for further workup]] FREE:[LAST:[[facility]],PHONE:[(   )    -],FAX:[(   )    -],ADDRESS:[Transfer to [facility] for further workup]],PROVIDER:[TOKEN:[[id number]]]

## 2020-08-31 NOTE — DISCHARGE NOTE NURSING/CASE MANAGEMENT/SOCIAL WORK - NSDCPNPNATDISSUGG_GEN_ALL_CORE
[facility]    [address]    [place] [address]    Phone:  [phone]    Fax:  [phone]       Thank You for choosing us for your health care visit. We are glad to serve you and happy to provide you with this summary of your visit. Please help us to ensure we have accurate records. If you find anything that needs to be changed, please let our staff know as soon as possible.          Your Demographic Information     Patient Name Sex Jesus Alberto Lanza Male 1999       Ethnic Group Patient Race    Not of  or  Origin White      Your Visit Details     Date & Time Provider Department    2017 3:15 PM ROXANNE Mckeon MD [facility]      Conditions Discussed Today or Order-Related Diagnoses        Comments    Tinnitus, unspecified laterality    -  Primary       Your Vitals Were     BP Pulse Temp Resp    108/62 (17 %/ 30 %)* (BP Location: [facility], Patient Position: Sitting, Cuff Size: Regular) 78 97.5 °F (36.4 °C) (Tympanic) 18    Height Weight BMI Smoking Status    5' 8\" (1.727 m) (35 %, Z= -0.40)† 129 lb 3.2 oz (58.6 kg) (24 %, Z= -0.71)† 19.64 kg/m2 (24 %, Z= -0.70)† Never Smoker    *BP percentiles are based on NHBPEP's 4th Report    †Growth percentiles are based on CDC 2-20 Years data.      Medications Prescribed or Re-Ordered Today     predniSONE (DELTASONE) 20 MG tablet    Sig - Route: Take 1 tablet by mouth daily. - Oral    Class: Eprescribe    Pharmacy: [facility] [address] [place] [address] [place] [address] [facility] [address] [facility] [address] Ph #: [phone]    clotrimazole-betamethasone (LOTRISONE) cream    Sig: Apply bid    Class: Eprescribe    Pharmacy: [facility] [id number]  [place] [address] [facility] [address] [facility] [address] Ph #: [phone]      Your Current Medications Are        Disp Refills Start End    albuterol 108 (90 BASE) MCG/ACT inhaler 1 Inhaler 6 2016     Sig - Route: Inhale 2 puffs into the  lungs every 4 hours as needed for Shortness of Breath or Wheezing. - Inhalation    Class: Eprescribe    budesonide-formoterol (SYMBICORT) 160-4.5 MCG/ACT inhaler 1 Inhaler 11 12/6/2016     Sig - Route: Inhale 1 puff into the lungs 2 times daily. Rinse mouth after use - Inhalation    Class: Eprescribe    loratadine-pseudoephedrine (CLARITIN-D 24 HOUR)  MG per 24 hr tablet 30 tablet 6 12/6/2016     Sig - Route: Take 1 tablet by mouth daily. - Oral    fluticasone (FLONASE) 50 MCG/ACT nasal spray 16 g 6 12/6/2016     Sig - Route: Spray 2 sprays in each nostril daily. - Nasal    Class: Eprescribe    montelukast (SINGULAIR) 10 MG tablet 30 tablet 6 12/6/2016     Sig - Route: Take 1 tablet by mouth nightly. - Oral    Class: Eprescribe    Loratadine-Pseudoephedrine (CLARITIN-D 24 HOUR PO)        Class: Historical Med    Route: Oral    predniSONE (DELTASONE) 20 MG tablet 10 tablet 0 1/11/2017     Sig - Route: Take 1 tablet by mouth daily. - Oral    Class: Eprescribe    clotrimazole-betamethasone (LOTRISONE) cream 30 g 0 1/11/2017     Sig: Apply bid    Class: Eprescribe      Allergies     Pollen Other (See Comments)    Cough, runny nose, whezzing    Seasonal Other (See Comments)    Runny nose      Immunizations History as of 1/11/2017     Name Date    Influenza Quadrivalent Preservative Free 12/6/2016  5:02 PM, 11/25/2015    Meningococcal Conjugate MCV4P (Menactra) 11/25/2015      Problem List as of 1/11/2017     Allergic asthma    AR (allergic rhinitis)              Patient Instructions    Clinic hours for Dr. AMERICA Mckeon:  Monday 8am - 2pm   Tuesday 10am - 7pm  Wednesday 8am - 2pm  Thursday 8am - 2pm  Friday  8am - 2pm    If you need a refill on your prescription please call your pharmacy and let them know. Please be proactive and call before your medication runs out. The pharmacy will then contact us for the refill. Please allow 24-48 hours for the refill to be processed.     If your physician has ordered additional  laboratory or radiology testing as part of your ongoing plan of care, please allow 5-7 business days from the day of your lab draw or test for the results to be sent and reviewed by your provider. If your results are critical and require more immediate intervention, you will be contacted sooner. Your results will be conveyed to you via a phone call or letter.    You may be receiving a patient satisfaction survey in the mail. Please take the time to complete, as your feedback is very important to us. We strive to make your experience exceptional and your comments help us with that goal. We look forward to hearing from you.      Tinnitus (Ringing in the Ears)  Tinnitus is the term for a noise in your ear not caused by an outside sound. The noise might be a ringing, clicking, hiss, or roar. It can vary in pitch and may be soft or quite loud. For some people, tinnitus is a minor nuisance. But for others, the noise can make it hard to hear, work, and even sleep. When tinnitus can't be cured, a number of treatments may offer relief.     A radio tuned to static or a masker may help block out bothersome tinnitus.     What Causes Tinnitus?  Loud noises, hearing loss, and ear wax can cause tinnitus. So can certain medications. Large amounts of aspirin or caffeine are sometimes to blame. In many cases, the exact cause of tinnitus is unknown.  How Is Tinnitus Treated?  Identifying and removing the cause is the best way to treat tinnitus. For that reason, your health care provider may refer you to an otolaryngologist (ear, nose, and throat doctor). Your hearing may also be checked by an audiologist (hearing specialist). If you have hearing loss, wearing a hearing aid may help your tinnitus. When the cause can't be found, the tinnitus itself may be treated. Some of the treatments are listed below: Your health care provider can tell you more about them:  · Maskers are small devices that look like hearing aids. They emit a pleasant  sound that helps cover up the ringing in your ears. Hearing aids and maskers are sometimes used together.  · Medications that treat anxiety and depression may ease tinnitus in some people.  · Hypnosis or relaxation therapy may help head noise seem less severe.  · Tinnitus retraining therapy combines counseling and maskers. Both can help take your mind off the tinnitus.  For More Information  · American Speech-Hearing-Language Association  884.549.2453  www.christiano.org  · American Tinnitus Association  460.898.1929  www.rima.org  · National [place] on Deafness and other Communication Disorders  279.262.1719  www.nidcd.nih.gov   © 2229-4835 [employer]. [address] [place] [address], [place] [address]. All rights reserved. This information is not intended as a substitute for professional medical care. Always follow your healthcare professional's instructions.           Patient Instructions History       No

## 2020-08-31 NOTE — DISCHARGE NOTE PROVIDER - NSDCCPCAREPLAN_GEN_ALL_CORE_FT
PRINCIPAL DISCHARGE DIAGNOSIS  Diagnosis: Esophageal obstruction  Assessment and Plan of Treatment: Esophageal mass on EGD- transfer to [facility] for further workup.      SECONDARY DISCHARGE DIAGNOSES  Diagnosis: Vomiting without nausea, intractability of vomiting not specified, unspecified vomiting type  Assessment and Plan of Treatment: zofran as needed. NPO

## 2020-08-31 NOTE — DISCHARGE NOTE PROVIDER - CARE PROVIDERS DIRECT ADDRESSES
,DirectAddress_Unknown ,DirectAddress_Unknown,[email or web address]

## 2020-08-31 NOTE — CHART NOTE - NSCHARTNOTEFT_GEN_A_CORE
Spoke with RN at Transfer Center   Pt to be transferred to medical floor [facility] for additional workup and intervention for partial esophageal obstruction. S/P EGD with esophageal mass - now on clear liquid diet.   Aspiration precautions. Spoke with RN at Transfer Center   Pt to be transferred to medical floor [facility] for additional workup and intervention for partial esophageal obstruction. S/P EGD with esophageal mass - now on clear liquid diet.   Wife, Elena notified of transfer  Aspiration precautions.

## 2020-08-31 NOTE — DISCHARGE NOTE PROVIDER - HOSPITAL COURSE
86 year old with a history of essential tremor, prostate CA, repaired carotid stenosis anxiety hypertension, and GERD comes with approx. 2 week history of difficulty swallowing and excessive saliva (with copious amounts of mucus as per pt wife). Patient stated that after trying to eat roast beef for dinner, he had immediate feeling of throwing up, which he did. Pt also stated that throughout the entire evening into the night, he was not able to keep anything down not even water. Pt stated approximately 2 weeks prior to admission he had a similar episode of difficulty passing the food, he did develop chest pain at that time.  He felt that the food passed and the chest pain resolved.  Pt also stated that over the past 2 weeks, at any time he ate or drank fluids, he would feel uncomfortable in his chest.  He said that uncomfortable feeling was tolerable, which is why he didn't seek medical care.     Patient status post EGD today which revealed an esophageal mass, suspicious for malignancy - biopsy sent for pathology.        Pt to be transferred to [facility] for esophageal stent and further treatment. Hospital Course    86 year old with a history of essential tremor, prostate CA, repaired carotid stenosis anxiety hypertension, and GERD comes with approx. 2 week history of difficulty swallowing and excessive saliva (with copious amounts of mucus as per pt wife). Patient stated that after trying to eat roast beef for dinner, he had immediate feeling of throwing up, which he did. Pt also stated that throughout the entire evening into the night, he was not able to keep anything down not even water. Pt stated approximately 2 weeks prior to admission he had a similar episode of difficulty passing the food, he did develop chest pain at that time.  He felt that the food passed and the chest pain resolved.  Pt also stated that over the past 2 weeks, at any time he ate or drank fluids, he would feel uncomfortable in his chest.  He said that uncomfortable feeling was tolerable, which is why he didn't seek medical care.     Patient status post EGD today which revealed an esophageal mass, suspicious for malignancy - biopsy sent for pathology.    All po home meds have been hold and started on iv meds prn unless evaluated by speech and swallow. please resume home meds at [facility] when cleared by speech/swallow.        Source of Infection:    Antibiotic / Last Day: N/A        Palliative Care / Advanced Care Planning    Code Status:full    Patient/Family agreeable to Hospice/Palliative (Y/N)?N/A    Summary of Goals of Care Conversation:n/A        Discharging Provider:  Georgette Dumont MD    Contact Info: Cell [phone]- Please call with any questions or concerns.        Signout given to  Accepting Hospitalist at [facility] : Dr. Drew Valdez        Accepting GI physician : Dr. Mina Michele,         Pt to be transferred to [facility] for esophageal stent and further treatment.         Total time spent on d/c >35 minutes. Hospital Course    86 year old with a history of essential tremor, prostate CA, repaired carotid stenosis anxiety hypertension, and GERD comes with approx. 2 week history of difficulty swallowing and excessive saliva (with copious amounts of mucus as per pt wife). Patient stated that after trying to eat roast beef for dinner, he had immediate feeling of throwing up, which he did. Pt also stated that throughout the entire evening into the night, he was not able to keep anything down not even water. Pt stated approximately 2 weeks prior to admission he had a similar episode of difficulty passing the food, he did develop chest pain at that time.  He felt that the food passed and the chest pain resolved.  Pt also stated that over the past 2 weeks, at any time he ate or drank fluids, he would feel uncomfortable in his chest.  He said that uncomfortable feeling was tolerable, which is why he didn't seek medical care.     Patient status post EGD today which revealed an esophageal mass, suspicious for malignancy - biopsy sent for pathology.        Source of Infection:    Antibiotic / Last Day: N/A        Palliative Care / Advanced Care Planning    Code Status:full    Patient/Family agreeable to Hospice/Palliative (Y/N)?N/A    Summary of Goals of Care Conversation:n/A        Discharging Provider:  Georgette Dumont MD    Contact Info: Cell [phone]- Please call with any questions or concerns.        Signout given to  Accepting Hospitalist at [facility] : Dr. Drew Valdez        Accepting GI physician : Dr. Mina Michele,         Pt to be transferred to [facility] for esophageal stent and further treatment.         Total time spent on d/c >35 minutes.

## 2020-08-31 NOTE — PROGRESS NOTE ADULT - ASSESSMENT
86 year old with a history of essential tremor, prostate CA, repaired carotid stenosis anxiety hypertension, and GERD comes with a 1.5 week history of difficulty swallowing and excessive saliva.  Mr. Junior states he ate a meal 1.5 weeks ago and had difficulty passing the food, he did develop chest pain at that time.  He feels the food passed and the chest pain resolved.  Over the last week he had so similar difficulties but last night ate roast beef and feels the food has not passed.  He denies any chest pain currently.  Has not felt anything made his symptoms better but food makes the pain worse.    #Dysphagia  Seen by GI  S/P EGD today - Esophageal mass, suspicious for malignancy. GI Recommend transfer to [facility] for esophageal stent and further work up  GI cordinating transfer process will follow up  Clear liquid diet  Continue Pepcid    #HTN  monitor BP  home meds on hold due to dysphagia/esophageal mass , pending spl consult  iv labetalol to keep sbp <140 prn    #Prostate ca   home meds on hold due to dysphagia    #MARC, cr 1.4-1.5 ? baseline  cont IVF   monitor cr    #DVT ppx: heparin s/c    Dispo : transfer to [facility] when Accepted.      case d/w dr. Chavez

## 2020-08-31 NOTE — PROGRESS NOTE ADULT - SUBJECTIVE AND OBJECTIVE BOX
INTERVAL HPI/OVERNIGHT EVENTS: No overnight events. Patient seen and examined at bedside,  patient has no complaints. Patient for EGD today.    HPI:  86 year old with a history of essential tremor, prostate CA, repaired carotid stenosis anxiety hypertension, and GERD comes with a 1.5 week history of difficulty swallowing and excessive saliva.  Mr. Junior states he ate a meal 1.5 weeks ago and had difficulty passing the food, he did develop chest pain at that time.  He feels the food passed and the chest pain resolved.  Over the last week he had so similar difficulties but last night ate roast beef and feels the food has not passed.  He denies any chest pain currently.  Has not felt anything made his symptoms better but food makes the pain worse. (30 Aug 2020 11:39)    MEDICATIONS  (STANDING):  dextrose 5% + sodium chloride 0.45%. 1000 milliLiter(s) (80 mL/Hr) IV Continuous <Continuous>  famotidine  IVPB 20 milliGRAM(s) IV Intermittent daily  heparin   Injectable 5000 Unit(s) SubCutaneous every 12 hours  labetalol Injectable 5 milliGRAM(s) IV Push two times a day  LORazepam   Injectable 1 milliGRAM(s) IV Push three times a day    MEDICATIONS  (PRN):  ondansetron Injectable 4 milliGRAM(s) IV Push every 6 hours PRN Nausea and/or Vomiting      Allergies    No Known Allergies    Intolerances        PHYSICAL EXAM:   Vital Signs:  Vital Signs Last 24 Hrs  T(C): 37 (31 Aug 2020 08:42), Max: 37 (31 Aug 2020 08:42)  T(F): 98.6 (31 Aug 2020 08:42), Max: 98.6 (31 Aug 2020 08:42)  HR: 98 (31 Aug 2020 08:42) (89 - 98)  BP: 161/80 (31 Aug 2020 08:42) (112/67 - 177/86)  BP(mean): --  RR: 16 (31 Aug 2020 08:42) (16 - 18)  SpO2: 95% (31 Aug 2020 08:42) (92% - 95%)  Daily Height in cm: 175.26 (30 Aug 2020 16:33)    Daily I&O's Summary    30 Aug 2020 07:01  -  31 Aug 2020 07:00  --------------------------------------------------------  IN: 0 mL / OUT: 300 mL / NET: -300 mL        GENERAL:  NAD  HEENT:  NC/AT,  conjunctivae clear and pink, no thyromegaly, nodules, adenopathy, no JVD, sclera -anicteric  CHEST:  Full & symmetric excursion, no increased effort, breath sounds clear  HEART:  Regular rhythm, S1, S2, no murmur/rub/S3/S4, no abdominal bruit, no edema  ABDOMEN:  Soft, non-tender, non-distended, normoactive bowel sounds,  no masses ,no hepato-splenomegaly, no signs of chronic liver disease  EXTREMITIES:  +2 edema at ankles, no cyanosis, clubbing   SKIN:  Warm, intact  NEURO:  Alert, oriented, no asterixis, no tremor, no encephalopathy      LABS:                        12.5   7.10  )-----------( 224      ( 31 Aug 2020 06:22 )             40.1     08-31    141  |  105  |  13  ----------------------------<  116<H>  3.5   |  26  |  1.41<H>    Ca    8.8      31 Aug 2020 06:22    TPro  6.8  /  Alb  3.2<L>  /  TBili  0.6  /  DBili  x   /  AST  13  /  ALT  12  /  AlkPhos  59  08-31    PT/INR - ( 30 Aug 2020 07:50 )   PT: 13.3 sec;   INR: 1.10 ratio         PTT - ( 30 Aug 2020 07:50 )  PTT:39.6 sec    amylase   lipaseLipase, Serum: 81 U/L (08-30 @ 07:50)    RADIOLOGY & ADDITIONAL TESTS:    < from: CT Chest No Cont (08.30.20 @ 08:14) >    EXAM:  CT CHEST      PROCEDURE DATE:  08/30/2020        INTERPRETATION:  CLINICAL INFORMATION: Vomiting, concern for esophageal foreign body    COMPARISON: None.    PROCEDURE:  CT of the Chest was performed without intravenous contrast.  Sagittal and coronal reformats were performed.    FINDINGS:    LUNGS AND AIRWAYS: Patent central airways.  Right apical calcified granuloma. No segmental consolidation. No pulmonary edema. Apical pleural parenchymal scarring.  PLEURA: No pleural effusion.  MEDIASTINUM AND NICKIE: Marked thickening of the distal esophagus with more predominant thickening on the right lateral and posterior aspect with narrowing of the esophageal lumen highly concerning for esophageal neoplasm till proven otherwise. Focal debrisseen above this area of thickening.  VESSELS: Atherosclerotic changes of the aorta and coronary vasculature.  HEART: Heart size is normal. No pericardial effusion.  CHEST WALL AND LOWER NECK: Within normal limits.  VISUALIZED UPPER ABDOMEN: Mild bilateral renal atrophy. Left renal cyst. Colonic diverticulosis without diverticulitis.  BONES: Degenerative changes.    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 Recommend upper endoscopy for further evaluation.    VLADISLAV HUDSON M.D., ATTENDING RADIOLOGIST  This document has been electronically signed. Aug 30 2020  8:25AM

## 2020-09-01 ENCOUNTER — INPATIENT (INPATIENT)
Facility: HOSPITAL | Age: 85
LOS: 6 days | Discharge: HOME HEALTH | End: 2020-09-08
Attending: HOSPITALIST | Admitting: HOSPITALIST
Payer: MEDICARE

## 2020-09-01 ENCOUNTER — RESULT REVIEW (OUTPATIENT)
Age: 85
End: 2020-09-01

## 2020-09-01 VITALS
DIASTOLIC BLOOD PRESSURE: 77 MMHG | TEMPERATURE: 98 F | HEART RATE: 82 BPM | RESPIRATION RATE: 18 BRPM | OXYGEN SATURATION: 95 % | SYSTOLIC BLOOD PRESSURE: 120 MMHG

## 2020-09-01 VITALS
OXYGEN SATURATION: 99 % | WEIGHT: 171.3 LBS | RESPIRATION RATE: 16 BRPM | HEIGHT: 72 IN | DIASTOLIC BLOOD PRESSURE: 91 MMHG | SYSTOLIC BLOOD PRESSURE: 165 MMHG | TEMPERATURE: 98 F | HEART RATE: 89 BPM

## 2020-09-01 DIAGNOSIS — K22.2 ESOPHAGEAL OBSTRUCTION: ICD-10-CM

## 2020-09-01 DIAGNOSIS — K22.8 OTHER SPECIFIED DISEASES OF ESOPHAGUS: ICD-10-CM

## 2020-09-01 DIAGNOSIS — I10 ESSENTIAL (PRIMARY) HYPERTENSION: ICD-10-CM

## 2020-09-01 DIAGNOSIS — G25.0 ESSENTIAL TREMOR: ICD-10-CM

## 2020-09-01 PROBLEM — C61 MALIGNANT NEOPLASM OF PROSTATE: Chronic | Status: ACTIVE | Noted: 2020-08-30

## 2020-09-01 PROBLEM — K21.9 GASTRO-ESOPHAGEAL REFLUX DISEASE WITHOUT ESOPHAGITIS: Chronic | Status: ACTIVE | Noted: 2020-08-30

## 2020-09-01 PROCEDURE — 99285 EMERGENCY DEPT VISIT HI MDM: CPT | Mod: 25

## 2020-09-01 PROCEDURE — 85027 COMPLETE CBC AUTOMATED: CPT

## 2020-09-01 PROCEDURE — 88360 TUMOR IMMUNOHISTOCHEM/MANUAL: CPT | Mod: 26

## 2020-09-01 PROCEDURE — 43239 EGD BIOPSY SINGLE/MULTIPLE: CPT | Mod: 59,GC

## 2020-09-01 PROCEDURE — 86769 SARS-COV-2 COVID-19 ANTIBODY: CPT

## 2020-09-01 PROCEDURE — 85730 THROMBOPLASTIN TIME PARTIAL: CPT

## 2020-09-01 PROCEDURE — 43266 EGD ENDOSCOPIC STENT PLACE: CPT | Mod: GC

## 2020-09-01 PROCEDURE — 86901 BLOOD TYPING SEROLOGIC RH(D): CPT

## 2020-09-01 PROCEDURE — 88305 TISSUE EXAM BY PATHOLOGIST: CPT

## 2020-09-01 PROCEDURE — 12345: CPT | Mod: NC

## 2020-09-01 PROCEDURE — 88305 TISSUE EXAM BY PATHOLOGIST: CPT | Mod: 26

## 2020-09-01 PROCEDURE — 93005 ELECTROCARDIOGRAM TRACING: CPT

## 2020-09-01 PROCEDURE — 86850 RBC ANTIBODY SCREEN: CPT

## 2020-09-01 PROCEDURE — 85610 PROTHROMBIN TIME: CPT

## 2020-09-01 PROCEDURE — 36415 COLL VENOUS BLD VENIPUNCTURE: CPT

## 2020-09-01 PROCEDURE — 80053 COMPREHEN METABOLIC PANEL: CPT

## 2020-09-01 PROCEDURE — 99239 HOSP IP/OBS DSCHRG MGMT >30: CPT

## 2020-09-01 PROCEDURE — 70450 CT HEAD/BRAIN W/O DYE: CPT | Mod: 26

## 2020-09-01 PROCEDURE — 99233 SBSQ HOSP IP/OBS HIGH 50: CPT

## 2020-09-01 PROCEDURE — 88312 SPECIAL STAINS GROUP 1: CPT

## 2020-09-01 PROCEDURE — 99222 1ST HOSP IP/OBS MODERATE 55: CPT | Mod: GC,25

## 2020-09-01 PROCEDURE — 99223 1ST HOSP IP/OBS HIGH 75: CPT

## 2020-09-01 PROCEDURE — U0003: CPT

## 2020-09-01 PROCEDURE — 96360 HYDRATION IV INFUSION INIT: CPT

## 2020-09-01 PROCEDURE — 71250 CT THORAX DX C-: CPT

## 2020-09-01 PROCEDURE — 86900 BLOOD TYPING SEROLOGIC ABO: CPT

## 2020-09-01 PROCEDURE — 76000 FLUOROSCOPY <1 HR PHYS/QHP: CPT | Mod: 26,59

## 2020-09-01 PROCEDURE — 83690 ASSAY OF LIPASE: CPT

## 2020-09-01 PROCEDURE — 84484 ASSAY OF TROPONIN QUANT: CPT

## 2020-09-01 RX ORDER — AMLODIPINE BESYLATE 2.5 MG/1
5 TABLET ORAL DAILY
Refills: 0 | Status: DISCONTINUED | OUTPATIENT
Start: 2020-09-01 | End: 2020-09-08

## 2020-09-01 RX ORDER — AMLODIPINE BESYLATE 2.5 MG/1
5 TABLET ORAL DAILY
Refills: 0 | Status: DISCONTINUED | OUTPATIENT
Start: 2020-09-01 | End: 2020-09-01

## 2020-09-01 RX ORDER — ATORVASTATIN CALCIUM 80 MG/1
10 TABLET, FILM COATED ORAL AT BEDTIME
Refills: 0 | Status: DISCONTINUED | OUTPATIENT
Start: 2020-09-01 | End: 2020-09-08

## 2020-09-01 RX ORDER — HALOPERIDOL DECANOATE 100 MG/ML
0.5 INJECTION INTRAMUSCULAR EVERY 6 HOURS
Refills: 0 | Status: DISCONTINUED | OUTPATIENT
Start: 2020-09-01 | End: 2020-09-08

## 2020-09-01 RX ORDER — PANTOPRAZOLE SODIUM 20 MG/1
40 TABLET, DELAYED RELEASE ORAL
Refills: 0 | Status: DISCONTINUED | OUTPATIENT
Start: 2020-09-01 | End: 2020-09-08

## 2020-09-01 RX ORDER — DOXAZOSIN MESYLATE 4 MG
8 TABLET ORAL AT BEDTIME
Refills: 0 | Status: DISCONTINUED | OUTPATIENT
Start: 2020-09-01 | End: 2020-09-08

## 2020-09-01 RX ORDER — ASPIRIN/CALCIUM CARB/MAGNESIUM 324 MG
325 TABLET ORAL DAILY
Refills: 0 | Status: DISCONTINUED | OUTPATIENT
Start: 2020-09-01 | End: 2020-09-08

## 2020-09-01 RX ORDER — SODIUM CHLORIDE 9 MG/ML
1000 INJECTION INTRAMUSCULAR; INTRAVENOUS; SUBCUTANEOUS
Refills: 0 | Status: DISCONTINUED | OUTPATIENT
Start: 2020-09-01 | End: 2020-09-02

## 2020-09-01 RX ADMIN — Medication 8 MILLIGRAM(S): at 22:38

## 2020-09-01 RX ADMIN — ATORVASTATIN CALCIUM 10 MILLIGRAM(S): 80 TABLET, FILM COATED ORAL at 22:38

## 2020-09-01 RX ADMIN — AMLODIPINE BESYLATE 5 MILLIGRAM(S): 2.5 TABLET ORAL at 17:42

## 2020-09-01 RX ADMIN — SODIUM CHLORIDE 75 MILLILITER(S): 9 INJECTION INTRAMUSCULAR; INTRAVENOUS; SUBCUTANEOUS at 11:10

## 2020-09-01 NOTE — PROGRESS NOTE ADULT - ASSESSMENT
87 y/o M with a history of essential tremor, prostate CA, repaired carotid stenosis, anxiety, hypertension, and GERD transferred from [facility] for management of esophageal mass.  Pt presented to [place] with 1.5-2 weeks of dysphagia and odynophagia, and at times difficulty managing secretions.  Pt had EGD showing esophageal mass which was biopsied.  He was transferred to [facility] for evaluation for placement of esophageal stent.  Pt currently reports no complaints.  No nausea, abd pain, chest pain, fever, or cough.   87 y/o M with a history of essential tremor, prostate CA, repaired carotid stenosis, anxiety, hypertension, and GERD transferred from [facility] for management of esophageal mass causing obstruction.

## 2020-09-01 NOTE — CHART NOTE - NSCHARTNOTEFT_GEN_A_CORE
Patient seen sitting in chair  Wife has arrived  Grand-daughter Effie [phone] is point person    < from: Upper Endoscopy (09.01.20 @ 11:00) >    Impression:          -Large esophageal obstructing mass s/p biopsy.                       -Placement of a EndoMAXX fully covered 19 mm x 150 mm                        esophageal stent was deployed using a wire under             endoscopic and fluoroscopic guided technique.  Recommendation:      - [facility].                       - Clear liquid diet.                       - Await pathology results.    85 y/o M with a history of essential tremor, prostate CA, repaired carotid stenosis, anxiety, hypertension, and GERD transferred from [facility] for management of esophageal mass.  Pt presented to [place] with 1.5-2 weeks of dysphagia and odynophagia, and at times difficulty managing secretions.  Pt had EGD showing esophageal mass which was biopsied.  He was transferred to [facility] for evaluation for placement of esophageal stent.  Pt currently reports no complaints.  No nausea, abd pain, chest pain, fever, or cough.  Patient confused Ox1- only knows his name  believe he is home and does NOT know year or date Patient seen sitting in chair  Wife has arrived  Grand-daughter Effie [phone] is point person    < from: Upper Endoscopy (09.01.20 @ 11:00) >    Impression:          -Large esophageal obstructing mass s/p biopsy.                       -Placement of a EndoMAXX fully covered 19 mm x 150 mm                        esophageal stent was deployed using a wire under             endoscopic and fluoroscopic guided technique.  Recommendation:      - [facility] for [facility].                       - Clear liquid diet.                       - Await pathology results.    87 y/o M with a history of essential tremor, prostate CA, repaired carotid stenosis, anxiety, hypertension, and GERD transferred from [facility] for management of esophageal mass.  Pt presented to [place] with 1.5-2 weeks of dysphagia and odynophagia, and at times difficulty managing secretions.  Pt had EGD showing esophageal mass which was biopsied.  He was transferred to [facility] for evaluation for placement of esophageal stent.  Pt currently reports no complaints.  No nausea, abd pain, chest pain, fever, or cough.  Patient confused Ox1- only knows his name  believe he is home and does NOT know year or date    Patient seen with wife and Granddaughter Effie [phone]  Patient lives with wife and daughter.  He was USOH until Sunday 8/30/2020 after eating [employer] and roast beef and food clogged.  went to [facility] ER- Seen by GI there and ct showed mass GI did EGD and BXp- transferred to [facility]  Patient had EGD with stent today  PCP Dr Persaud [phone]  He Gets confused in hospital - was not confused at home- did same 6 years ago for carotid surgery.    # Oncology- Will get consult - Ct of chest /a/p  # Gi Dysphagia- Stent in place to oesophagus- will start liquid diet- advanced as per GI  #Change in MS Urine and Cuture/ Tsh/ b12/ Ct of head/ cbc diff  Haldol 0.5 mg Po/ Iv/ Im q6 prn aggitation.  3 Dvt proph he sq   Restart home meds

## 2020-09-01 NOTE — CONSULT NOTE ADULT - ASSESSMENT
Impression:  1) Dysphagia - with esophogeal mass seen on EGD biopsies pending transferred for esophogeal stent    Recommendations:   - keep patient NPO   - will plan for EGD with esophogeal stent today if possible   - supportive per primary    Olivia Rico  Gastroenterology Fellow  Pager x [phone] or [phone]  (From 8 am - 5 pm or on weekends and holidays please page GI on call at [phone])

## 2020-09-01 NOTE — CONSULT NOTE ADULT - ASSESSMENT
ASSESSMENT:  86 year old with a history of essential tremor, prostate CA, repaired carotid stenosis, anxiety, hypertension, and GERD transferred from [place] to [facility] with 2 week history of difficulty swallowing and excessive saliva n/w diagnosis of obstructuve esophageal GE junction mass. He has temporal wasting on exam suggestive of muscle breakdown potentially in the setting of malignancy. Labs are significant for mildly elevated creatinine, low albumin, and mild anemia. His CT chest showed partially obstructive esophageal mass. GI placed covered esophageal stent today. 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 Wong  - Esophageal stent w/ GI today, clears after procedure  - Obtain nutritional markers for malnutrition (prealbumin)  - Follow up biopsy results from [place] and [facility]  - Recommend heme/onc consult if pathology comes back as malignancy  - No acute surgical intervention as patient will likely need neoadjuvant chemoradiotherapy prior to any resection and no emergent need for surgery (perforation, obstruction)  - Will continue to follow, full plan after discussion with Dr. Wong    [id number] ASSESSMENT:  86 year old with a history of essential tremor, prostate CA, repaired carotid stenosis, anxiety, hypertension, and GERD transferred from [place] to [facility] with 2 week history of difficulty swallowing and excessive saliva n/w diagnosis of obstructuve esophageal GE junction mass. He has temporal wasting on exam suggestive of muscle breakdown potentially in the setting of malignancy. Labs are significant for mildly elevated creatinine, low albumin, and mild anemia. His CT chest showed partially obstructive esophageal mass. GI placed covered esophageal stent today. 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 Wong  - Esophageal stent w/ GI today, clears after procedure  - Obtain nutritional markers for malnutrition (prealbumin)  - Follow up biopsy results from [place] and [facility]  - Recommend heme/onc consult if pathology comes back as malignancy, will need to determine further staging workup if malignancy   - No acute surgical intervention as patient will likely need neoadjuvant chemoradiotherapy prior to any resection and no emergent need for surgery (perforation, obstruction)  - Will continue to follow    [id number]

## 2020-09-01 NOTE — PROGRESS NOTE ADULT - ATTENDING COMMENTS
f/u gi f/u gi  Seen by GI  "Impression:  1) Dysphagia - with esophogeal mass seen on EGD biopsies pending transferred for esophogeal stent    Recommendations:   - keep patient NPO   - will plan for EGD with esophogeal stent today if possible   - supportive per primary" f/u gi  Seen by GI  "Impression:  1) Dysphagia - with esophogeal mass seen on EGD biopsies pending transferred for esophogeal stent    Recommendations:   - keep patient NPO   - will plan for EGD with esophogeal stent today if possible   - supportive per primary"    Will get Pal consult for GOC

## 2020-09-01 NOTE — H&P ADULT - NSHPLABSRESULTS_GEN_ALL_CORE
08-31    141  |  105  |  13  ----------------------------<  116<H>  3.5   |  26  |  1.41<H>    Ca    8.8      31 Aug 2020 06:22    TPro  6.8  /  Alb  3.2<L>  /  TBili  0.6  /  DBili  x   /  AST  13  /  ALT  12  /  AlkPhos  59  08-31                            12.5   7.10  )-----------( 224      ( 31 Aug 2020 06:22 )             40.1       CARDIAC MARKERS ( 30 Aug 2020 07:50 )  <.017 ng/mL / x     / x     / x     / x          < from: CT Chest No Cont (08.30.20 @ 08:14) >    Marked thickening of the mid to distal esophagus with associated luminal narrowing and moderate amount of debris seen above the area of thickening, findings 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 Recommend upper endoscopy for further evaluation.    < end of copied text >      PT/INR - ( 30 Aug 2020 07:50 )   PT: 13.3 sec;   INR: 1.10 ratio         PTT - ( 30 Aug 2020 07:50 )  PTT:39.6 sec    < from: CT Chest No Cont (08.30.20 @ 08:14) >    Marked thickening of the mid to distal esophagus with associated luminal narrowing and moderate amount of debris seen above the area of thickening, findings 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 Recommend upper endoscopy for further evaluation.    < end of copied text >

## 2020-09-01 NOTE — H&P ADULT - ASSESSMENT
87 y/o M with a history of essential tremor, prostate CA, repaired carotid stenosis, anxiety, hypertension, and GERD transferred from [facility] for management of esophageal mass causing obstruction.

## 2020-09-01 NOTE — CONSULT NOTE ADULT - SUBJECTIVE AND OBJECTIVE BOX
Chief Complaint:  Patient is a 86y old  Male who presents with a chief complaint of esophageal mass (01 Sep 2020 05:37)      HPI:  The patient is a 85 y/o M with a history of essential tremor, prostate CA, repaired carotid stenosis, anxiety, hypertension, and GERD transferred from [facility] for management of esophageal mass.  Pt presented to [place] with 1.5-2 weeks of dysphagia and odynophagia, and at times difficulty managing secretions.  Pt had EGD showing esophageal mass which was biopsied.  He was transferred to [facility] for evaluation for placement of esophageal stent.  Pt currently reports no complaints.  No nausea, abd pain, chest pain, fever, or cough.        Since arrival patient has has normal VS and normal laboratory values.    Allergies:  No Known Allergies      Home Medications:    Hospital Medications:  sodium chloride 0.9%. 1000 milliLiter(s) IV Continuous <Continuous>      PMHX/PSHX:  Prostate CA  GERD with apnea  Hypertension      Family history:  No pertinent family history in first degree relatives      Social History:     ROS:     General:  No wt loss, fevers, chills, night sweats, fatigue,   Eyes:  Good vision, no reported pain  ENT:  No sore throat, pain, runny nose, dysphagia  CV:  No pain, palpitations, hypo/hypertension  Resp:  No dyspnea, cough, tachypnea, wheezing  GI:  See HPI  :  No pain, bleeding, incontinence, nocturia  Muscle:  No pain, weakness  Neuro:  No weakness, tingling, memory problems  Psych:  No fatigue, insomnia, mood problems, depression  Endocrine:  No polyuria, polydipsia, cold/heat intolerance  Heme:  No petechiae, ecchymosis, easy bruisability  Skin:  No rash, edema      PHYSICAL EXAM:     GENERAL:  NAD  CHEST:  Full & symmetric excursion  HEART:  Regular rhythm, no abdominal bruit, no edema  ABDOMEN:  Soft, non-tender, non-distended, normoactive bowel sounds,  no masses , no hepatosplenomegaly  EXTREMITIES:  no cyanosis,clubbing or edema  SKIN:  No rash/erythema/ecchymoses/petechiae/wounds/abscess/warm/dry  NEURO:  Alert, oriented    Vital Signs:  Vital Signs Last 24 Hrs  T(C): 36.5 (01 Sep 2020 04:46), Max: 37 (31 Aug 2020 08:42)  T(F): 97.7 (01 Sep 2020 04:46), Max: 98.6 (31 Aug 2020 08:42)  HR: 90 (01 Sep 2020 04:46) (82 - 106)  BP: 108/56 (01 Sep 2020 04:46) (108/56 - 182/91)  BP(mean): --  RR: 17 (01 Sep 2020 04:46) (16 - 18)  SpO2: 97% (01 Sep 2020 04:46) (95% - 99%)  Daily Height in cm: 182.88 (01 Sep 2020 01:30)    Daily     LABS:                        12.5   7.10  )-----------( 224      ( 31 Aug 2020 06:22 )             40.1     08-31    141  |  105  |  13  ----------------------------<  116<H>  3.5   |  26  |  1.41<H>    Ca    8.8      31 Aug 2020 06:22    TPro  6.8  /  Alb  3.2<L>  /  TBili  0.6  /  DBili  x   /  AST  13  /  ALT  12  /  AlkPhos  59  08-31    LIVER FUNCTIONS - ( 31 Aug 2020 06:22 )  Alb: 3.2 g/dL / Pro: 6.8 g/dL / ALK PHOS: 59 U/L / ALT: 12 U/L / AST: 13 U/L / GGT: x           PT/INR - ( 30 Aug 2020 07:50 )   PT: 13.3 sec;   INR: 1.10 ratio         PTT - ( 30 Aug 2020 07:50 )  PTT:39.6 sec        Imaging:

## 2020-09-01 NOTE — H&P ADULT - NSHPREVIEWOFSYSTEMS_GEN_ALL_CORE
Review of Systems:   CONSTITUTIONAL: No fever or chills  EYES: No eye pain, visual disturbances, or discharge  ENMT:  No difficulty hearing, no throat pain  NECK: No pain or stiffness  RESPIRATORY: No cough, No shortness of breath  CARDIOVASCULAR: No chest pain, palpitations, dizziness, or leg swelling  GASTROINTESTINAL: Dysphagia, odynophagia  GENITOURINARY: No dysuria, or hematuria  NEUROLOGICAL: No headaches, weakness, or numbness  SKIN: No rashes, or lesions   LYMPH NODES: No enlarged glands  ENDOCRINE: No heat or cold intolerance  MUSCULOSKELETAL: No joint pain or swelling  PSYCHIATRIC: No depression or anxiety  HEME/LYMPH: No easy bruising, or bleeding  ALLERGY AND IMMUNOLOGIC: No hives or eczema

## 2020-09-01 NOTE — H&P ADULT - NSHPPHYSICALEXAM_GEN_ALL_CORE
Vital Signs Last 24 Hrs  T(C): 36.5 (01 Sep 2020 04:46), Max: 37 (31 Aug 2020 08:42)  T(F): 97.7 (01 Sep 2020 04:46), Max: 98.6 (31 Aug 2020 08:42)  HR: 90 (01 Sep 2020 04:46) (82 - 106)  BP: 108/56 (01 Sep 2020 04:46) (108/56 - 182/91)  BP(mean): --  RR: 17 (01 Sep 2020 04:46) (16 - 18)  SpO2: 97% (01 Sep 2020 04:46) (95% - 99%)    PHYSICAL EXAM:  GENERAL: No Acute Distress  EYES: conjunctiva and sclera clear  ENMT: Moist mucous membranes   NECK: Supple  PULMONARY: Clear to auscultation bilaterally  CARDIAC: Regular rate and rhythm; No murmurs, rubs, or gallops  GASTROINTESTINAL: Abdomen soft, Nontender, Nondistended; Bowel sounds normal  EXTREMITIES:   No clubbing, cyanosis, or pedal edema  PSYCH: Normal Affect, Normal Behavior  NEUROLOGY:   - Mental status: mild confusion, asking to call wife (at 4:30AM) but redirectable, and oriented "Hospital" and reason for admission.  - Motor Resting tremor of extremities  SKIN: No rashes or lesions  MUSCULOSKELETAL: No joint swelling

## 2020-09-01 NOTE — H&P ADULT - HISTORY OF PRESENT ILLNESS
85 y/o M with a history of essential tremor, prostate CA, repaired carotid stenosis, anxiety, hypertension, and GERD transferred from [facility] for management of esophageal mass.  Pt presented to [place] with 1.5-2 weeks of dysphagia and odynophagia, and at times difficulty managing secretions.  Pt had EGD showing esophageal mass which was biopsied.  He was transferred to [facility] for evaluation for placement of esophageal stent.  Pt currently reports no complaints.  No nausea, abd pain, chest pain, fever, or cough.

## 2020-09-01 NOTE — CONSULT NOTE ADULT - SUBJECTIVE AND OBJECTIVE BOX
General Surgery Consult Note  Attending: Dr. Tomas Wong  Service: D Team Surgery    HPI:  86 year old with a history of essential tremor, prostate CA, repaired carotid stenosis, anxiety, hypertension, and GERD transferred from [place] to [facility] with 2 week history of difficulty swallowing and excessive saliva (with copious amounts of mucus as per pt wife). Patient states that after trying to eat roast beef last night for dinner he regurgitated. He was also unable to swallow liquids.   He had similar episode 2 weeks ago and develop chest pain at that time. The food passed and the chest pain resolved. He endorsed chest discomfort while eating and drinking. He says that uncomfortable feeling was tolerable, which is why he didn't seek medical care. He doesn't believe anything makes his symptoms better but eating makes the pain worse. 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 He underwent EGD and subsequent biopsies of gastroesophageal junction mass. He was transferred to [facility] for Esophageal Stent Placement which was successfully placed today. Surgical oncology is consulted for obstructive esophageal mass        PAST MEDICAL & SURGICAL HISTORY:  Prostate CA  GERD with apnea  Hypertension      ALLERGIES:  Allergies    No Known Allergies    Intolerances        SOCIAL HISTORY:  Lives in private residence with wife  Denies tobacco use  Drinks 1 glass of wine a day      FAMILY HISTORY:    Mother  at age 57 from Alzheimer's  Father  at 62 of unknown cause      PHYSICAL EXAM:  General: NAD, temporal wasting  HEENT: NC/AT  Pulmonary: normal resp effort, CTA-B  Cardiovascular: NSR, no murmurs  Abdominal: soft, distended, NT, no organomegaly  Extremities: WWP, normal strength, no clubbing/cyanosis/edema  Neuro: A/O x 3, normal sensation, no focal deficits  Pulses: palpable distal pulses    VITAL SIGNS:  Vital Signs Last 24 Hrs  T(C): 35.9 (01 Sep 2020 11:00), Max: 36.8 (31 Aug 2020 16:10)  T(F): 96.6 (01 Sep 2020 11:00), Max: 98.3 (31 Aug 2020 16:10)  HR: 100 (01 Sep 2020 11:00) (82 - 106)  BP: 143/81 (01 Sep 2020 11:00) (108/56 - 182/91)  BP(mean): --  RR: 22 (01 Sep 2020 11:00) (16 - 22)  SpO2: 95% (01 Sep 2020 11:00) (95% - 99%)    I&O's Summary      LABS:                        12.5   7.10  )-----------( 224      ( 31 Aug 2020 06:22 )             40.1     08-    141  |  105  |  13  ----------------------------<  116<H>  3.5   |  26  |  1.41<H>    Ca    8.8      31 Aug 2020 06:22    TPro  6.8  /  Alb  3.2<L>  /  TBili  0.6  /  DBili  x   /  AST  13  /  ALT  12  /  AlkPhos  59          CAPILLARY BLOOD GLUCOSE        LIVER FUNCTIONS - ( 31 Aug 2020 06:22 )  Alb: 3.2 g/dL / Pro: 6.8 g/dL / ALK PHOS: 59 U/L / ALT: 12 U/L / AST: 13 U/L / GGT: x             CULTURES:        RADIOLOGY & ADDITIONAL STUDIES:    < from: CT Chest No Cont (20 @ 08:14) >  FINDINGS:    LUNGS AND AIRWAYS: Patent central airways.  Right apical calcified granuloma. No segmental consolidation. No pulmonary edema. Apical pleural parenchymal scarring.  PLEURA: No pleural effusion.  MEDIASTINUM AND NICKIE: Marked thickening of the distal esophagus with more predominant thickening on the right lateral and posterior aspect with narrowing of the esophageal lumen highly concerning for esophageal neoplasm till proven otherwise. Focal debrisseen above this area of thickening.  VESSELS: Atherosclerotic changes of the aorta and coronary vasculature.  HEART: Heart size is normal. No pericardial effusion.  CHEST WALL AND LOWER NECK: Within normal limits.  VISUALIZED UPPER ABDOMEN: Mild bilateral renal atrophy. Left renal cyst. Colonic diverticulosis without diverticulitis.  BONES: Degenerative changes.    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 Recommend upper endoscopy for further evaluation.    < end of copied text > General Surgery Consult Note  Attending: Dr. Tomas Wong  Service: D Team Surgery    HPI:  86 year old with a history of essential tremor, prostate CA, repaired carotid stenosis, anxiety, hypertension, and GERD transferred from [place] to [facility] with 2 week history of difficulty swallowing and excessive saliva (with copious amounts of mucus as per pt wife). Patient states that after trying to eat roast beef last night for dinner he regurgitated. He was also unable to swallow liquids. He had similar episode 2 weeks ago and develop chest pain at that time. The food passed and the chest pain resolved. He endorsed chest discomfort while eating and drinking. He says that uncomfortable feeling was tolerable, which is why he didn't seek medical care. He doesn't believe anything makes his symptoms better but eating makes the pain worse. 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 He underwent EGD and subsequent biopsies of gastroesophageal junction mass. He was transferred to [facility] for Esophageal Stent Placement which was successfully placed today. Surgical oncology is consulted for obstructive esophageal mass        PAST MEDICAL & SURGICAL HISTORY:  Prostate CA  GERD with apnea  Hypertension      ALLERGIES:  Allergies    No Known Allergies    Intolerances        SOCIAL HISTORY:  Lives in private residence with wife  Denies tobacco use  Drinks 1 glass of wine a day      FAMILY HISTORY:    Mother  at age 57 from Alzheimer's  Father  at 62 of unknown cause      PHYSICAL EXAM:  General: NAD, temporal wasting  HEENT: NC/AT  Pulmonary: normal resp effort, CTA-B  Cardiovascular: NSR, no murmurs  Abdominal: soft, distended, NT, no organomegaly  Extremities: WWP, normal strength, no clubbing/cyanosis/edema  Neuro: A/O x 3, normal sensation, no focal deficits  Pulses: palpable distal pulses    VITAL SIGNS:  Vital Signs Last 24 Hrs  T(C): 35.9 (01 Sep 2020 11:00), Max: 36.8 (31 Aug 2020 16:10)  T(F): 96.6 (01 Sep 2020 11:00), Max: 98.3 (31 Aug 2020 16:10)  HR: 100 (01 Sep 2020 11:00) (82 - 106)  BP: 143/81 (01 Sep 2020 11:00) (108/56 - 182/91)  BP(mean): --  RR: 22 (01 Sep 2020 11:00) (16 - 22)  SpO2: 95% (01 Sep 2020 11:00) (95% - 99%)    I&O's Summary      LABS:                        12.5   7.10  )-----------( 224      ( 31 Aug 2020 06:22 )             40.1     08-    141  |  105  |  13  ----------------------------<  116<H>  3.5   |  26  |  1.41<H>    Ca    8.8      31 Aug 2020 06:22    TPro  6.8  /  Alb  3.2<L>  /  TBili  0.6  /  DBili  x   /  AST  13  /  ALT  12  /  AlkPhos  59          CAPILLARY BLOOD GLUCOSE        LIVER FUNCTIONS - ( 31 Aug 2020 06:22 )  Alb: 3.2 g/dL / Pro: 6.8 g/dL / ALK PHOS: 59 U/L / ALT: 12 U/L / AST: 13 U/L / GGT: x             CULTURES:        RADIOLOGY & ADDITIONAL STUDIES:    < from: CT Chest No Cont (20 @ 08:14) >  FINDINGS:    LUNGS AND AIRWAYS: Patent central airways.  Right apical calcified granuloma. No segmental consolidation. No pulmonary edema. Apical pleural parenchymal scarring.  PLEURA: No pleural effusion.  MEDIASTINUM AND NICKIE: Marked thickening of the distal esophagus with more predominant thickening on the right lateral and posterior aspect with narrowing of the esophageal lumen highly concerning for esophageal neoplasm till proven otherwise. Focal debrisseen above this area of thickening.  VESSELS: Atherosclerotic changes of the aorta and coronary vasculature.  HEART: Heart size is normal. No pericardial effusion.  CHEST WALL AND LOWER NECK: Within normal limits.  VISUALIZED UPPER ABDOMEN: Mild bilateral renal atrophy. Left renal cyst. Colonic diverticulosis without diverticulitis.  BONES: Degenerative changes.    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 Recommend upper endoscopy for further evaluation.    < end of copied text > General Surgery Consult Note  Attending: Dr. Tomas Wong  Service: D Team Surgery    HPI:  86 year old with a history of essential tremor, prostate CA, repaired carotid stenosis, anxiety, hypertension, and GERD transferred from [place] to [facility] with 2 week history of difficulty swallowing and excessive saliva (with copious amounts of mucus as per pt wife). Patient states that after trying to eat roast beef last night for dinner he regurgitated. He was also unable to swallow liquids. He had similar episode 2 weeks ago and develop chest pain at that time. The food passed and the chest pain resolved. He endorsed chest discomfort while eating and drinking. He says that uncomfortable feeling was tolerable, which is why he didn't seek medical care. He doesn't believe anything makes his symptoms better but eating makes the pain worse. 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 He underwent EGD and subsequent biopsies of gastroesophageal junction mass. He was transferred to [facility] for Esophageal Stent Placement which was successfully placed today. He is having odynophagia, dysphagia to solid and liquids, unintentional weight loss (10-15 lbs over 3 weeks). He denies hoarseness, fever, chills. Surgical oncology is consulted for obstructive esophageal mass        PAST MEDICAL & SURGICAL HISTORY:  Prostate CA  GERD with apnea  Hypertension      ALLERGIES:  Allergies    No Known Allergies    Intolerances        SOCIAL HISTORY:  Lives in private residence with wife  Former smoker, quit 30 years ago, 1ppd  Drinks 1 glass of wine a day      FAMILY HISTORY:    Mother  at age 57 from Alzheimer's  Father  at 62 of unknown cause      PHYSICAL EXAM:  General: NAD, temporal wasting  HEENT: NC/AT  Pulmonary: normal resp effort, CTA-B  Cardiovascular: NSR, no murmurs  Abdominal: soft, distended, NT, no organomegaly  Extremities: WWP, normal strength, no clubbing/cyanosis/edema  Neuro: normal sensation, no focal deficits  Pulses: palpable distal pulses    VITAL SIGNS:  Vital Signs Last 24 Hrs  T(C): 35.9 (01 Sep 2020 11:00), Max: 36.8 (31 Aug 2020 16:10)  T(F): 96.6 (01 Sep 2020 11:00), Max: 98.3 (31 Aug 2020 16:10)  HR: 100 (01 Sep 2020 11:00) (82 - 106)  BP: 143/81 (01 Sep 2020 11:00) (108/56 - 182/91)  BP(mean): --  RR: 22 (01 Sep 2020 11:00) (16 - 22)  SpO2: 95% (01 Sep 2020 11:00) (95% - 99%)    I&O's Summary      LABS:                        12.5   7.10  )-----------( 224      ( 31 Aug 2020 06:22 )             40.1         141  |  105  |  13  ----------------------------<  116<H>  3.5   |  26  |  1.41<H>    Ca    8.8      31 Aug 2020 06:22    TPro  6.8  /  Alb  3.2<L>  /  TBili  0.6  /  DBili  x   /  AST  13  /  ALT  12  /  AlkPhos  59          CAPILLARY BLOOD GLUCOSE        LIVER FUNCTIONS - ( 31 Aug 2020 06:22 )  Alb: 3.2 g/dL / Pro: 6.8 g/dL / ALK PHOS: 59 U/L / ALT: 12 U/L / AST: 13 U/L / GGT: x             CULTURES:        RADIOLOGY & ADDITIONAL STUDIES:    < from: CT Chest No Cont (20 @ 08:14) >  FINDINGS:    LUNGS AND AIRWAYS: Patent central airways.  Right apical calcified granuloma. No segmental consolidation. No pulmonary edema. Apical pleural parenchymal scarring.  PLEURA: No pleural effusion.  MEDIASTINUM AND NICKIE: Marked thickening of the distal esophagus with more predominant thickening on the right lateral and posterior aspect with narrowing of the esophageal lumen highly concerning for esophageal neoplasm till proven otherwise. Focal debrisseen above this area of thickening.  VESSELS: Atherosclerotic changes of the aorta and coronary vasculature.  HEART: Heart size is normal. No pericardial effusion.  CHEST WALL AND LOWER NECK: Within normal limits.  VISUALIZED UPPER ABDOMEN: Mild bilateral renal atrophy. Left renal cyst. Colonic diverticulosis without diverticulitis.  BONES: Degenerative changes.    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 Recommend upper endoscopy for further evaluation.    < end of copied text > General Surgery Consult Note  Attending: Dr. Tomas Wong  Service: D Team Surgery    HPI:  86 year old with a history of essential tremor, prostate CA, repaired carotid stenosis, anxiety, hypertension, and GERD transferred from [place] to [facility] with 2 week history of difficulty swallowing and excessive saliva (with copious amounts of mucus as per pt wife). Patient states that after trying to eat roast beef last night for dinner he regurgitated. He was also unable to swallow liquids. He had similar episode 2 weeks ago and develop chest pain at that time. The food passed and the chest pain resolved. He endorsed chest discomfort while eating and drinking. He says that uncomfortable feeling was tolerable, which is why he didn't seek medical care. He doesn't believe anything makes his symptoms better but eating makes the pain worse. 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 He underwent EGD and subsequent biopsies of gastroesophageal junction mass. He was transferred to [facility] for Esophageal Stent Placement which was successfully placed today. He is having odynophagia, dysphagia to solid and liquids, unintentional weight loss (10-15 lbs over 3 weeks). He denies hoarseness, fever, chills. Surgical oncology is consulted for obstructive esophageal mass        PAST MEDICAL & SURGICAL HISTORY:  Prostate CA  GERD with apnea  Hypertension      ALLERGIES:  Allergies    No Known Allergies    Intolerances        SOCIAL HISTORY:  Lives in private residence with wife  Former smoker, quit 30 years ago, 1ppd  Drinks 1 glass of wine a day      FAMILY HISTORY:    Mother  at age 57 from Alzheimer's  Father  at 62 of unknown cause      PHYSICAL EXAM:  General: NAD, temporal wasting  HEENT: NC/AT  Pulmonary: normal resp effort, CTA-B  Cardiovascular: NSR, no murmurs  Abdominal: soft, distended, NT, no organomegaly  Extremities: WWP, normal strength, no clubbing/cyanosis/edema  Neuro: A&O x1, normal sensation, no focal deficits  Pulses: palpable distal pulses    VITAL SIGNS:  Vital Signs Last 24 Hrs  T(C): 35.9 (01 Sep 2020 11:00), Max: 36.8 (31 Aug 2020 16:10)  T(F): 96.6 (01 Sep 2020 11:00), Max: 98.3 (31 Aug 2020 16:10)  HR: 100 (01 Sep 2020 11:00) (82 - 106)  BP: 143/81 (01 Sep 2020 11:00) (108/56 - 182/91)  BP(mean): --  RR: 22 (01 Sep 2020 11:00) (16 - 22)  SpO2: 95% (01 Sep 2020 11:00) (95% - 99%)    I&O's Summary      LABS:                        12.5   7.10  )-----------( 224      ( 31 Aug 2020 06:22 )             40.1         141  |  105  |  13  ----------------------------<  116<H>  3.5   |  26  |  1.41<H>    Ca    8.8      31 Aug 2020 06:22    TPro  6.8  /  Alb  3.2<L>  /  TBili  0.6  /  DBili  x   /  AST  13  /  ALT  12  /  AlkPhos  59          CAPILLARY BLOOD GLUCOSE        LIVER FUNCTIONS - ( 31 Aug 2020 06:22 )  Alb: 3.2 g/dL / Pro: 6.8 g/dL / ALK PHOS: 59 U/L / ALT: 12 U/L / AST: 13 U/L / GGT: x             CULTURES:        RADIOLOGY & ADDITIONAL STUDIES:    < from: CT Chest No Cont (20 @ 08:14) >  FINDINGS:    LUNGS AND AIRWAYS: Patent central airways.  Right apical calcified granuloma. No segmental consolidation. No pulmonary edema. Apical pleural parenchymal scarring.  PLEURA: No pleural effusion.  MEDIASTINUM AND NICKIE: Marked thickening of the distal esophagus with more predominant thickening on the right lateral and posterior aspect with narrowing of the esophageal lumen highly concerning for esophageal neoplasm till proven otherwise. Focal debrisseen above this area of thickening.  VESSELS: Atherosclerotic changes of the aorta and coronary vasculature.  HEART: Heart size is normal. No pericardial effusion.  CHEST WALL AND LOWER NECK: Within normal limits.  VISUALIZED UPPER ABDOMEN: Mild bilateral renal atrophy. Left renal cyst. Colonic diverticulosis without diverticulitis.  BONES: Degenerative changes.    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 Recommend upper endoscopy for further evaluation.    < end of copied text >

## 2020-09-02 DIAGNOSIS — Z29.9 ENCOUNTER FOR PROPHYLACTIC MEASURES, UNSPECIFIED: ICD-10-CM

## 2020-09-02 DIAGNOSIS — R13.10 DYSPHAGIA, UNSPECIFIED: ICD-10-CM

## 2020-09-02 DIAGNOSIS — Z51.5 ENCOUNTER FOR PALLIATIVE CARE: ICD-10-CM

## 2020-09-02 DIAGNOSIS — R45.1 RESTLESSNESS AND AGITATION: ICD-10-CM

## 2020-09-02 DIAGNOSIS — R41.0 DISORIENTATION, UNSPECIFIED: ICD-10-CM

## 2020-09-02 LAB
ANION GAP SERPL CALC-SCNC: 13 MMO/L — SIGNIFICANT CHANGE UP (ref 7–14)
BASOPHILS # BLD AUTO: 0.04 K/UL — SIGNIFICANT CHANGE UP (ref 0–0.2)
BASOPHILS NFR BLD AUTO: 0.5 % — SIGNIFICANT CHANGE UP (ref 0–2)
BUN SERPL-MCNC: 14 MG/DL — SIGNIFICANT CHANGE UP (ref 7–23)
CALCIUM SERPL-MCNC: 8.7 MG/DL — SIGNIFICANT CHANGE UP (ref 8.4–10.5)
CHLORIDE SERPL-SCNC: 105 MMOL/L — SIGNIFICANT CHANGE UP (ref 98–107)
CO2 SERPL-SCNC: 22 MMOL/L — SIGNIFICANT CHANGE UP (ref 22–31)
CREAT SERPL-MCNC: 1.27 MG/DL — SIGNIFICANT CHANGE UP (ref 0.5–1.3)
EOSINOPHIL # BLD AUTO: 0.17 K/UL — SIGNIFICANT CHANGE UP (ref 0–0.5)
EOSINOPHIL NFR BLD AUTO: 2 % — SIGNIFICANT CHANGE UP (ref 0–6)
GLUCOSE SERPL-MCNC: 89 MG/DL — SIGNIFICANT CHANGE UP (ref 70–99)
HCT VFR BLD CALC: 37.9 % — LOW (ref 39–50)
HGB BLD-MCNC: 11.6 G/DL — LOW (ref 13–17)
IMM GRANULOCYTES NFR BLD AUTO: 0.7 % — SIGNIFICANT CHANGE UP (ref 0–1.5)
LYMPHOCYTES # BLD AUTO: 1.12 K/UL — SIGNIFICANT CHANGE UP (ref 1–3.3)
LYMPHOCYTES # BLD AUTO: 13.2 % — SIGNIFICANT CHANGE UP (ref 13–44)
MAGNESIUM SERPL-MCNC: 1.7 MG/DL — SIGNIFICANT CHANGE UP (ref 1.6–2.6)
MCHC RBC-ENTMCNC: 25.2 PG — LOW (ref 27–34)
MCHC RBC-ENTMCNC: 30.6 % — LOW (ref 32–36)
MCV RBC AUTO: 82.2 FL — SIGNIFICANT CHANGE UP (ref 80–100)
MONOCYTES # BLD AUTO: 0.87 K/UL — SIGNIFICANT CHANGE UP (ref 0–0.9)
MONOCYTES NFR BLD AUTO: 10.3 % — SIGNIFICANT CHANGE UP (ref 2–14)
NEUTROPHILS # BLD AUTO: 6.21 K/UL — SIGNIFICANT CHANGE UP (ref 1.8–7.4)
NEUTROPHILS NFR BLD AUTO: 73.3 % — SIGNIFICANT CHANGE UP (ref 43–77)
NRBC # FLD: 0 K/UL — SIGNIFICANT CHANGE UP (ref 0–0)
PHOSPHATE SERPL-MCNC: 2.9 MG/DL — SIGNIFICANT CHANGE UP (ref 2.5–4.5)
PLATELET # BLD AUTO: 183 K/UL — SIGNIFICANT CHANGE UP (ref 150–400)
PMV BLD: 10.6 FL — SIGNIFICANT CHANGE UP (ref 7–13)
POTASSIUM SERPL-MCNC: 3.8 MMOL/L — SIGNIFICANT CHANGE UP (ref 3.5–5.3)
POTASSIUM SERPL-SCNC: 3.8 MMOL/L — SIGNIFICANT CHANGE UP (ref 3.5–5.3)
RBC # BLD: 4.61 M/UL — SIGNIFICANT CHANGE UP (ref 4.2–5.8)
RBC # FLD: 16 % — HIGH (ref 10.3–14.5)
SODIUM SERPL-SCNC: 140 MMOL/L — SIGNIFICANT CHANGE UP (ref 135–145)
SURGICAL PATHOLOGY STUDY: SIGNIFICANT CHANGE UP
WBC # BLD: 8.47 K/UL — SIGNIFICANT CHANGE UP (ref 3.8–10.5)
WBC # FLD AUTO: 8.47 K/UL — SIGNIFICANT CHANGE UP (ref 3.8–10.5)

## 2020-09-02 PROCEDURE — 99232 SBSQ HOSP IP/OBS MODERATE 35: CPT | Mod: GC

## 2020-09-02 PROCEDURE — 93010 ELECTROCARDIOGRAM REPORT: CPT

## 2020-09-02 PROCEDURE — 74177 CT ABD & PELVIS W/CONTRAST: CPT | Mod: 26

## 2020-09-02 PROCEDURE — 99497 ADVNCD CARE PLAN 30 MIN: CPT | Mod: GC,25

## 2020-09-02 PROCEDURE — 99222 1ST HOSP IP/OBS MODERATE 55: CPT | Mod: GC

## 2020-09-02 PROCEDURE — 99223 1ST HOSP IP/OBS HIGH 75: CPT | Mod: GC

## 2020-09-02 PROCEDURE — 99233 SBSQ HOSP IP/OBS HIGH 50: CPT

## 2020-09-02 RX ADMIN — PANTOPRAZOLE SODIUM 40 MILLIGRAM(S): 20 TABLET, DELAYED RELEASE ORAL at 06:08

## 2020-09-02 RX ADMIN — Medication 8 MILLIGRAM(S): at 21:37

## 2020-09-02 RX ADMIN — ATORVASTATIN CALCIUM 10 MILLIGRAM(S): 80 TABLET, FILM COATED ORAL at 21:37

## 2020-09-02 RX ADMIN — Medication 325 MILLIGRAM(S): at 17:54

## 2020-09-02 RX ADMIN — AMLODIPINE BESYLATE 5 MILLIGRAM(S): 2.5 TABLET ORAL at 06:08

## 2020-09-02 NOTE — PROGRESS NOTE ADULT - PROBLEM SELECTOR PLAN 1
- esophageal stent doneby Gi 9/1  Advanced to Full liquid diet toda  - IVF  GI greatly appreciated  Onc consulted- ct A/p with contrast today

## 2020-09-02 NOTE — GOALS OF CARE CONVERSATION - ADVANCED CARE PLANNING - TREATMENT GUIDELINE COMMENT
Wishes everything to be done for now. Further discussions post biopsy results, staging based off CT findings, and treatment options.

## 2020-09-02 NOTE — CONSULT NOTE ADULT - PROBLEM SELECTOR RECOMMENDATION 4
- GOC discussed with grand-daughter today, patient stated he wishes to be full code  - Grand-daughter wishes to get more information about the cancer, prognosis and treatment options to make a more informed decision. Agrees with grandfather's wish to be full code.  - Patient stated he had a living will, will need to get a HCP filled out for grand-daughter when we get a chance.   - Will follow up with family when more information is available

## 2020-09-02 NOTE — CONSULT NOTE ADULT - ASSESSMENT
87 y/o M with a history of essential tremor, prostate CA (dx 15 years ago s/p RT), repaired carotid stenosis, anxiety, hypertension, and GERD transferred from [facility] after he underwent EGD and subsequent biopsies of gastroesophageal junction mass on 8/31, patient s/p Esophageal Stent Placement on 9/1/20.    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   -CT A/P: Redemonstrated masslike thickening of the distal esophagus, highly suspicious for neoplasm, now status post esophageal stent placement.  About 6 indeterminate right lobe liver lesions measuring up to 1 cm in segment 6, raises concern for metastatic disease. Recommend further evaluation with dedicated contrast enhanced liver protocol MR.  -EGD and subsequent biopsies of gastroesophageal junction mass on 8/31, patient s/p Esophageal Stent Placement on 9/1/20  -Will f/u pathology results  -Will f/u MRI liver  -Please send CEA, CA 19-9  -Patient can follow in [facility] for treatment after discharge    Judi Pineda PGY5  Heme/Onc fellow  [phone]

## 2020-09-02 NOTE — CONSULT NOTE ADULT - PROBLEM SELECTOR RECOMMENDATION 3
- Likely CA, pending pathology  - pending CT Abd/Pelv for staging, CT chest and brain shows no active sites of metastasis  - Heme/Onc on board, will appreciate recommendations

## 2020-09-02 NOTE — CONSULT NOTE ADULT - ATTENDING COMMENTS
Patient seen and examined with the GI fellow. I agree with the above assessment and plan. Thank you for allowing us to care for your patient.    Patient with an esophageal mass causing obstruction. Plan for EGD with stent placement.
Pt seen with fellow. Agree with above.  newly diagnosed gastric mass eval for cancer s/p stent.   Goals as above. Await path and rec from onc.

## 2020-09-02 NOTE — CONSULT NOTE ADULT - PROBLEM SELECTOR RECOMMENDATION 9
- Patient currently on 1-1, secondary to agitation, currently AOx3, oriented.   - Will try to avoid antipsychotics, will educate nursing to try to keep patient awake during the day, allow patient to sleep at night, and frequent reminders of location and date

## 2020-09-02 NOTE — PROGRESS NOTE ADULT - ATTENDING COMMENTS
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 Correlate with pending pathology.  About 6 indeterminate right lobe liver lesions measuring up to 1 cm in segment 6, raises concern for metastatic disease. Recommend further evaluation with dedicated contrast enhanced liver protocol MR.  No lymphadenopathy.    will get mri as rec by rad

## 2020-09-02 NOTE — CONSULT NOTE ADULT - SUBJECTIVE AND OBJECTIVE BOX
HPI:  87 y/o M with a history of essential tremor, prostate CA, repaired carotid stenosis, anxiety, hypertension, and GERD transferred from [facility] for management of esophageal mass.    Pt presented to [place] with complaint of 1.5-2 weeks of dysphagia and odynophagia, and at times difficulty managing secretions.    At [place], CT chest showed Marked thickening of the mid to distal esophagus with associated luminal narrowing and moderate amount of debris seen above the area of thickening, findings 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 He underwent EGD and subsequent biopsies of gastroesophageal junction mass on 8/31.  He was transferred to [facility] for Esophageal Stent Placement which was successfully placed on 9/1/20   He is having odynophagia, dysphagia to solid and liquids, unintentional weight loss (10-15 lbs over 3 weeks). He denies hoarseness, fever, chills. Surgical oncology is consulted for obstructive esophageal mass      REVIEW OF SYSTEMS:  All other review of systems is negative unless indicated above.  Allergies    No Known Allergies    Intolerances        PAST MEDICAL & SURGICAL HISTORY:  Prostate CA  GERD with apnea  Hypertension      FAMILY HISTORY:  No pertinent family history in first degree relatives      Social History:          VITAL SIGNS:  Vital Signs Last 24 Hrs  T(C): 36.9 (02 Sep 2020 05:41), Max: 36.9 (02 Sep 2020 05:41)  T(F): 98.4 (02 Sep 2020 05:41), Max: 98.4 (02 Sep 2020 05:41)  HR: 85 (02 Sep 2020 05:41) (85 - 107)  BP: 148/74 (02 Sep 2020 05:41) (140/72 - 166/76)  BP(mean): 94 (01 Sep 2020 13:00) (94 - 94)  RR: 17 (02 Sep 2020 05:41) (16 - 22)  SpO2: 95% (02 Sep 2020 05:41) (95% - 100%)      PHYSICAL EXAM:     GENERAL: no acute distress  HEENT: EOMI, neck supple  RESPIRATORY: LCTAB/L, no rhonchi, rales, or wheezing  CARDIOVASCULAR: RRR, no murmurs, gallops, rubs  ABDOMINAL: soft, non-tender, non-distended, positive bowel sounds   EXTREMITIES: no clubbing, cyanosis, or edema  NEUROLOGICAL: alert and oriented                           11.6   8.47  )-----------( 183      ( 02 Sep 2020 06:15 )             37.9     09-02    140  |  105  |  14  ----------------------------<  89  3.8   |  22  |  1.27    Ca    8.7      02 Sep 2020 06:15  Phos  2.9     09-02  Mg     1.7     09-02        MEDICATIONS  (STANDING):  amLODIPine   Tablet 5 milliGRAM(s) Oral daily  aspirin 325 milliGRAM(s) Oral daily  atorvastatin 10 milliGRAM(s) Oral at bedtime  doxazosin 8 milliGRAM(s) Oral at bedtime  pantoprazole    Tablet 40 milliGRAM(s) Oral before breakfast  propranolol 60 milliGRAM(s) Oral two times a day HPI:  85 y/o M with a history of essential tremor, prostate CA, repaired carotid stenosis, anxiety, hypertension, and GERD transferred from [facility] for management of esophageal mass.    Pt presented to [place] with complaint of 1.5-2 weeks of dysphagia and odynophagia, and at times difficulty managing secretions.    At [place], CT chest showed Marked thickening of the mid to distal esophagus with associated luminal narrowing and moderate amount of debris seen above the area of thickening, findings 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 He underwent EGD and subsequent biopsies of gastroesophageal junction mass on 8/31.  He was transferred to [facility] for Esophageal Stent Placement which was successfully placed on 9/1/20   He is having odynophagia, dysphagia to solid and liquids, unintentional weight loss (10-15 lbs over 3 weeks). He denies hoarseness, fever, chills. Surgical oncology is consulted for obstructive esophageal mass      REVIEW OF SYSTEMS:  All other review of systems is negative unless indicated above.  Allergies    No Known Allergies    Intolerances        PAST MEDICAL & SURGICAL HISTORY:  Prostate CA  GERD with apnea  Hypertension      FAMILY HISTORY:  No pertinent family history in first degree relatives      Social History: former smoker till 30 years ago, used to smoke 1PPD          VITAL SIGNS:  Vital Signs Last 24 Hrs  T(C): 36.9 (02 Sep 2020 05:41), Max: 36.9 (02 Sep 2020 05:41)  T(F): 98.4 (02 Sep 2020 05:41), Max: 98.4 (02 Sep 2020 05:41)  HR: 85 (02 Sep 2020 05:41) (85 - 107)  BP: 148/74 (02 Sep 2020 05:41) (140/72 - 166/76)  BP(mean): 94 (01 Sep 2020 13:00) (94 - 94)  RR: 17 (02 Sep 2020 05:41) (16 - 22)  SpO2: 95% (02 Sep 2020 05:41) (95% - 100%)      PHYSICAL EXAM:     GENERAL: no acute distress  RESPIRATORY: LCTAB/L, no rhonchi, rales, or wheezing  CARDIOVASCULAR: RRR, no murmurs, gallops, rubs  ABDOMINAL: soft, non-tender, non-distended, positive bowel sounds   EXTREMITIES: no clubbing, cyanosis, or edema  NEUROLOGICAL: alert and oriented                           11.6   8.47  )-----------( 183      ( 02 Sep 2020 06:15 )             37.9     09-02    140  |  105  |  14  ----------------------------<  89  3.8   |  22  |  1.27    Ca    8.7      02 Sep 2020 06:15  Phos  2.9     09-02  Mg     1.7     09-02        MEDICATIONS  (STANDING):  amLODIPine   Tablet 5 milliGRAM(s) Oral daily  aspirin 325 milliGRAM(s) Oral daily  atorvastatin 10 milliGRAM(s) Oral at bedtime  doxazosin 8 milliGRAM(s) Oral at bedtime  pantoprazole    Tablet 40 milliGRAM(s) Oral before breakfast  propranolol 60 milliGRAM(s) Oral two times a day

## 2020-09-02 NOTE — CONSULT NOTE ADULT - PROBLEM SELECTOR RECOMMENDATION 2
- 2/2 to esophageal mass, s/p stent placement  - Able to tolerate clear liquid, progress diet per GI and primary  - Will continue to monitor

## 2020-09-02 NOTE — GOALS OF CARE CONVERSATION - ADVANCED CARE PLANNING - CONVERSATION DETAILS
Called Wife at [phone] around 1PM. Wife, Elena, defers to grandchild for making decisions. Per Wisam, patient's wife has some dementia and relies on her for medical decisions. Discussed hospital course and discussion about GOC we had with patient.    Patient stated that he wishes to go home if it's cancer, however wishes to be full code. After discussion with Grand-Daughter, she wishes we continue current therapy, grandfather should be full code, and further management after staging and biopsy pathology. If surgical intervention only would be available, would consider it.

## 2020-09-02 NOTE — CONSULT NOTE ADULT - ASSESSMENT
Patient is a 85 y/o M with a history of essential tremor, prostate CA, carotid stenosis s/p repair, previous TIA, ARIANNA, essential hypertension, and GERD who was transferred from [facility] for management of esophageal mass.

## 2020-09-02 NOTE — PROGRESS NOTE ADULT - ASSESSMENT
Impression:  1) Dysphagia - with esophogeal mass seen on EGD biopsies pending transferred for esophogeal stent now s/p EGD with stent placement    Recommendations:   - f/u biopsies   - can advance diet to full liquids today    - supportive per primary    Olivia Rico  Gastroenterology Fellow  Pager x [phone] or [phone]  (From 8 am - 5 pm or on weekends and holidays please page GI on call at [phone])

## 2020-09-02 NOTE — PROGRESS NOTE ADULT - ASSESSMENT
87 y/o M with a history of essential tremor, prostate CA, repaired carotid stenosis, anxiety, hypertension, and GERD transferred from [facility] for management of esophageal mass.  Pt presented to [place] with 1.5-2 weeks of dysphagia and odynophagia, and at times difficulty managing secretions.  Pt had EGD showing esophageal mass which was biopsied.  He was transferred to [facility] for evaluation for placement of esophageal stent.  Pt currently reports no complaints.  No nausea, abd pain, chest pain, fever, or cough.   87 y/o M with a history of essential tremor, prostate CA, repaired carotid stenosis, anxiety, hypertension, and GERD transferred from [facility] for management of esophageal mass causing obstruction.    esophageal mass- r/o malignancy

## 2020-09-02 NOTE — CONSULT NOTE ADULT - SUBJECTIVE AND OBJECTIVE BOX
HPI:  Patient is a 85 y/o M with a history of essential tremor, prostate CA, carotid stenosis s/p repair, previous TIA, ARIANNA, essential hypertension, and GERD who was transferred from [facility] for management of esophageal mass.  Pt presented to [place] with 1.5-2 weeks of dysphagia and odynophagia, and at times difficulty managing secretions.  Pt had EGD showing esophageal mass which was biopsied.  He was transferred to [facility] for evaluation for placement of esophageal stent.  Pt currently reports no complaints.  No nausea, abd pain, chest pain, fever, or cough.     Patient is currently post EGD and stent placement, patient was started on clear liquid diet and tolerate oral food. Patient stated he at time has some chest discomfort however can not call it pain. Denies any other issues.      PERTINENT PM/SXH:   Prostate CA  GERD with apnea  Hypertension      FAMILY HISTORY:  No pertinent family history in first degree relatives      ITEMS NOT CHECKED ARE NOT PRESENT    [B] SOCIAL HISTORY: [/B]  Significant other/partner:  [ x]  Children:  [ ]  Mormon/Spirituality:  Substance hx:  [ ]   Tobacco hx:  [ ]   Alcohol hx: [ ]   Home Opioid hx:  [ ] I-Stop Reference No:  Living Situation: [x ]Home  [ ]Long term care  [ ]Rehab [ ]Other    ADVANCE DIRECTIVES:    DNR  MOLST  [ ]  Living Will  [x ]   DECISION MAKER(s):  [ ] Health Care Proxy(s)  [ x] Surrogate(s)  [ ] Guardian           Name(s): Grand-daughter Wisam Phone Number(s): [phone]    BASELINE (I)ADL(s) (prior to admission):  [place]: [ x]Total  [ ] Moderate [ ]Dependent    Allergies    No Known Allergies    Intolerances    MEDICATIONS  (STANDING):  amLODIPine   Tablet 5 milliGRAM(s) Oral daily  aspirin 325 milliGRAM(s) Oral daily  atorvastatin 10 milliGRAM(s) Oral at bedtime  doxazosin 8 milliGRAM(s) Oral at bedtime  pantoprazole    Tablet 40 milliGRAM(s) Oral before breakfast  propranolol 60 milliGRAM(s) Oral two times a day    MEDICATIONS  (PRN):  haloperidol     Tablet 0.5 milliGRAM(s) Oral every 6 hours PRN agitation  haloperidol    Injectable 0.5 milliGRAM(s) IntraMuscular every 6 hours PRN agitation  haloperidol    Injectable 0.5 milliGRAM(s) IV Push every 6 hours PRN agitation  haloperidol    Injectable 0.5 milliGRAM(s) IV Push once PRN agitation      PRESENT SYMPTOMS: [ ]Unable to obtain due to poor mentation   Source if other than patient:  [ ]Family   [ ]Team     Pain (Impact on QOL):    Location -       DENIES  Minimal acceptable level (0-10 scale):                    Aggravating factors -  Quality -  Radiation -  Severity (0-10 scale) -    Timing -    PAIN AD Score:     [email or web address] (press ctrl +  left click to view)    Dyspnea:                           [ ]Mild [ ]Moderate [ ]Severe  Anxiety:                             [ ]Mild [ ]Moderate [ ]Severe  Fatigue:                             [ ]Mild [ ]Moderate [ ]Severe  Nausea:                             [ ]Mild [ ]Moderate [ ]Severe  Loss of appetite:              [ ]Mild [ ]Moderate [ ]Severe  Constipation:                    [ ]Mild [ ]Moderate [ ]Severe    Other Symptoms:  [x ]All other review of systems negative     Karnofsky Performance Score/Palliative Performance Status Version 2:      70   %    http://palliative.info/resource_material/[email or web address].pdf    PHYSICAL EXAM:  Vital Signs Last 24 Hrs  T(C): 36.9 (02 Sep 2020 05:41), Max: 36.9 (02 Sep 2020 05:41)  T(F): 98.4 (02 Sep 2020 05:41), Max: 98.4 (02 Sep 2020 05:41)  HR: 85 (02 Sep 2020 05:41) (85 - 107)  BP: 148/74 (02 Sep 2020 05:41) (144/85 - 166/76)  BP(mean): --  RR: 17 (02 Sep 2020 05:41) (16 - 18)  SpO2: 95% (02 Sep 2020 05:41) (95% - 100%) I&O's Summary    01 Sep 2020 07:01  -  02 Sep 2020 07:00  --------------------------------------------------------  IN: 0 mL / OUT: 400 mL / NET: -400 mL    GENERAL:  [x ]Alert  [x ]Oriented x 3  [ ]Lethargic  [ ]Cachexia  [ ]Unarousable  [x ]Verbal  [ ]Non-Verbal  Behavioral:   [ ] Anxiety  [ ] Delirium [ ] Agitation [ ] Other  HEENT:  [x ]Normal   [ ]Dry mouth   [ ]ET Tube/Trach  [ ]Oral lesions  PULMONARY:   [x ]Clear [ ]Tachypnea  [ ]Audible excessive secretions   [ ]Rhonchi        [ ]Right [ ]Left [ ]Bilateral  [ ]Crackles        [ ]Right [ ]Left [ ]Bilateral  [ ]Wheezing     [ ]Right [ ]Left [ ]Bilateral  CARDIOVASCULAR:    [x ]Regular [ ]Irregular [ ]Tachy  [ ]Carlos [ ]Murmur [ ]Other  GASTROINTESTINAL:  [x]Soft  [x ]Distended   [x ]+BS  [ ]Non tender [ ]Tender  [ ]PEG [ ]OGT/ NGT  Last BM:   GENITOURINARY:  [ ]Normal [ ] Incontinent   [ ]Oliguria/Anuria   [ ][place]  MUSCULOSKELETAL:   [x ]Normal   [ ]Weakness  [ ]Bed/Wheelchair bound [ ]Edema  NEUROLOGIC:   [x ]No focal deficits  [ ] Cognitive impairment  [ ] Dysphagia [ ]Dysarthria [ ] Paresis [ ]Other   SKIN:   [x ]Normal   [ ]Pressure ulcer(s)  [ ]Rash    CRITICAL CARE:  [ ] Shock Present  [ ]Septic [ ]Cardiogenic [ ]Neurologic [ ]Hypovolemic  [ ]  Vasopressors [ ]  Inotropes   [ ] Respiratory failure present  [ ] Acute  [ ] Chronic [ ] Hypoxic  [ ] Hypercarbic [ ] Other  [ ] Other organ failure     GRIEF  [ ] Yes  [x ] No    LABS:                        11.6   8.47  )-----------( 183      ( 02 Sep 2020 06:15 )             37.9   09-02    140  |  105  |  14  ----------------------------<  89  3.8   |  22  |  1.27    Ca    8.7      02 Sep 2020 06:15  Phos  2.9     09-02  Mg     1.7     09-02      PROTEIN CALORIE MALNUTRITION PRESENT: [ ] Yes [ x] No  [ ] PPSV2 < or = to 30% [ ] significant weight loss  [ ] poor nutritional intake [ ] catabolic state [ ] anasarca     Albumin, Serum: 3.2 g/dL (08-31-20 @ 06:22)  Artificial Nutrition [ ]     REFERRALS:   [ ]Chaplaincy  [ ] Hospice  [ ]Child Life  [ ]Social Work  [ ]Case management [ ]Holistic Therapy   Goals of Care Discussion Document:

## 2020-09-03 LAB
ANION GAP SERPL CALC-SCNC: 15 MMO/L — HIGH (ref 7–14)
BUN SERPL-MCNC: 17 MG/DL — SIGNIFICANT CHANGE UP (ref 7–23)
CALCIUM SERPL-MCNC: 8.9 MG/DL — SIGNIFICANT CHANGE UP (ref 8.4–10.5)
CHLORIDE SERPL-SCNC: 106 MMOL/L — SIGNIFICANT CHANGE UP (ref 98–107)
CO2 SERPL-SCNC: 21 MMOL/L — LOW (ref 22–31)
CREAT SERPL-MCNC: 1.27 MG/DL — SIGNIFICANT CHANGE UP (ref 0.5–1.3)
GLUCOSE SERPL-MCNC: 94 MG/DL — SIGNIFICANT CHANGE UP (ref 70–99)
HCT VFR BLD CALC: 40.5 % — SIGNIFICANT CHANGE UP (ref 39–50)
HGB BLD-MCNC: 12.6 G/DL — LOW (ref 13–17)
MAGNESIUM SERPL-MCNC: 1.7 MG/DL — SIGNIFICANT CHANGE UP (ref 1.6–2.6)
MCHC RBC-ENTMCNC: 25.9 PG — LOW (ref 27–34)
MCHC RBC-ENTMCNC: 31.1 % — LOW (ref 32–36)
MCV RBC AUTO: 83.2 FL — SIGNIFICANT CHANGE UP (ref 80–100)
NRBC # FLD: 0 K/UL — SIGNIFICANT CHANGE UP (ref 0–0)
PHOSPHATE SERPL-MCNC: 2.7 MG/DL — SIGNIFICANT CHANGE UP (ref 2.5–4.5)
PLATELET # BLD AUTO: 192 K/UL — SIGNIFICANT CHANGE UP (ref 150–400)
PMV BLD: 11.5 FL — SIGNIFICANT CHANGE UP (ref 7–13)
POTASSIUM SERPL-MCNC: 4 MMOL/L — SIGNIFICANT CHANGE UP (ref 3.5–5.3)
POTASSIUM SERPL-SCNC: 4 MMOL/L — SIGNIFICANT CHANGE UP (ref 3.5–5.3)
RBC # BLD: 4.87 M/UL — SIGNIFICANT CHANGE UP (ref 4.2–5.8)
RBC # FLD: 16.4 % — HIGH (ref 10.3–14.5)
SODIUM SERPL-SCNC: 142 MMOL/L — SIGNIFICANT CHANGE UP (ref 135–145)
WBC # BLD: 11.86 K/UL — HIGH (ref 3.8–10.5)
WBC # FLD AUTO: 11.86 K/UL — HIGH (ref 3.8–10.5)

## 2020-09-03 PROCEDURE — 99232 SBSQ HOSP IP/OBS MODERATE 35: CPT | Mod: GC

## 2020-09-03 PROCEDURE — 99232 SBSQ HOSP IP/OBS MODERATE 35: CPT

## 2020-09-03 RX ADMIN — ATORVASTATIN CALCIUM 10 MILLIGRAM(S): 80 TABLET, FILM COATED ORAL at 22:02

## 2020-09-03 RX ADMIN — Medication 325 MILLIGRAM(S): at 18:22

## 2020-09-03 RX ADMIN — AMLODIPINE BESYLATE 5 MILLIGRAM(S): 2.5 TABLET ORAL at 05:12

## 2020-09-03 RX ADMIN — PANTOPRAZOLE SODIUM 40 MILLIGRAM(S): 20 TABLET, DELAYED RELEASE ORAL at 05:12

## 2020-09-03 RX ADMIN — Medication 8 MILLIGRAM(S): at 22:02

## 2020-09-03 NOTE — PROGRESS NOTE ADULT - ATTENDING COMMENTS
Pt seen with fellow. Agree with above.  newly diagnosed adenoca of stomach.  asked to see for goc.  await onc and staging to have follow up meeting with family.  as for now full code and goal of dmt

## 2020-09-03 NOTE — PROGRESS NOTE ADULT - ASSESSMENT
ASSESSMENT:  86 year old with a history of essential tremor, prostate CA, repaired carotid stenosis, anxiety, hypertension, and GERD transferred from [place] to [facility] with 2 week history of difficulty swallowing and excessive saliva n/w diagnosis of obstructuve esophageal GE junction mass with pathology significant for moderately differentiated adenocarcinoma. 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  - Recommend heme/onc consult, will need to determine further staging workup if malignancy, MRI to assess liver lesion concerning more metastatic disease  - No acute surgical intervention as patient will likely need neoadjuvant chemoradiotherapy prior to any resection and no emergent need for surgery (perforation, obstruction)  - Will continue to follow    [id number] ASSESSMENT:  86 year old with a history of essential tremor, prostate CA, repaired carotid stenosis, anxiety, hypertension, and GERD transferred from [place] to [facility] with 2 week history of difficulty swallowing and excessive saliva n/w diagnosis of obstructuve esophageal GE junction mass with pathology significant for moderately differentiated adenocarcinoma. Palliative care discussing management with grandchildren and seem to want to pursue treatment. His CA 19,9 and CEA were normal and is pending MRI abdomen to characterize liver lesion which is possibly metastatic.       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

## 2020-09-03 NOTE — PROGRESS NOTE ADULT - ASSESSMENT
87 y/o M with a history of essential tremor, prostate CA (dx 15 years ago s/p RT), repaired carotid stenosis, anxiety, hypertension, and GERD transferred from  after he underwent EGD and subsequent biopsies of gastroesophageal junction mass on 8/31, patient s/p Esophageal Stent Placement on 9/1/20.    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   -CT A/P: Redemonstrated masslike thickening of the distal esophagus, highly suspicious for neoplasm, now status post esophageal stent placement.  About 6 indeterminate right lobe liver lesions measuring up to 1 cm in segment 6, raises concern for metastatic disease. Recommend further evaluation with dedicated contrast enhanced liver protocol MR.  -EGD and subsequent biopsies of gastroesophageal junction mass on 8/31, patient s/p Esophageal Stent Placement on 9/1/20  -Pathology consistent with moderately differentiated adenocarcinoma, will f/u IHC  -Will f/u MRI liver to characterise liver lesions  -Please send CEA, CA 19-9  -Patient can follow in [facility] for treatment after discharge    Judi Pineda PGY5  Heme/Onc fellow  [phone]

## 2020-09-03 NOTE — PROGRESS NOTE ADULT - ASSESSMENT
Patient is a 87 y/o M with a history of essential tremor, prostate CA, carotid stenosis s/p repair, previous TIA, ARIANNA, essential hypertension, and GERD who was transferred from [facility] for management of esophageal mass.

## 2020-09-03 NOTE — PROGRESS NOTE ADULT - ATTENDING COMMENTS
Patient seen and examined with the GI fellow. I agree with the above assessment and plan. Thank you for allowing us to care for your patient.    Patient post EGD with stent placement. Doing well.  Biopsies were positive. Follow up oncology recommendations.

## 2020-09-03 NOTE — PROGRESS NOTE ADULT - ASSESSMENT
Impression:  1) Dysphagia - with esophogeal mass seen on EGD biopsies pending transferred for esophogeal stent now s/p EGD with stent placement tolerating diet    Recommendations:   - f/u biopsies   - cancontinue full liquids today    - supportive per primary    Olivia Rico  Gastroenterology Fellow  Pager x [phone] or [phone]  (From 8 am - 5 pm or on weekends and holidays please page GI on call at [phone])

## 2020-09-03 NOTE — PROGRESS NOTE ADULT - ASSESSMENT
87 y/o M with a history of essential tremor, prostate CA, repaired carotid stenosis, anxiety, hypertension, and GERD transferred from [facility] for management of esophageal mass.  Pt presented to [place] with 1.5-2 weeks of dysphagia and odynophagia, and at times difficulty managing secretions.  Pt had EGD showing esophageal mass which was biopsied.  He was transferred to [facility] for evaluation for placement of esophageal stent.  Pt currently reports no complaints.  No nausea, abd pain, chest pain, fever, or cough.   87 y/o M with a history of essential tremor, prostate CA, repaired carotid stenosis, anxiety, hypertension, and GERD transferred from [facility] for management of esophageal mass causing obstruction.    esophageal mass- r/o malignancy

## 2020-09-03 NOTE — PROGRESS NOTE ADULT - PROBLEM SELECTOR PLAN 1
- AOx3 today, no overnight events  - Aware of the current situation and pending MRI Abd for further classification of CA stage

## 2020-09-03 NOTE — PROGRESS NOTE ADULT - PROBLEM SELECTOR PLAN 3
- Per surgery note, pathology resulted in adenoCA of esophagus  - Pending MRI abd for possible liver mets and staging

## 2020-09-04 ENCOUNTER — TRANSCRIPTION ENCOUNTER (OUTPATIENT)
Age: 85
End: 2020-09-04

## 2020-09-04 LAB
ANION GAP SERPL CALC-SCNC: 17 MMO/L — HIGH (ref 7–14)
BUN SERPL-MCNC: 22 MG/DL — SIGNIFICANT CHANGE UP (ref 7–23)
CALCIUM SERPL-MCNC: 9.1 MG/DL — SIGNIFICANT CHANGE UP (ref 8.4–10.5)
CANCER AG19-9 SERPL-ACNC: 29 U/ML — SIGNIFICANT CHANGE UP
CEA SERPL-MCNC: < 1 NG/ML — LOW (ref 1–3.8)
CHLORIDE SERPL-SCNC: 103 MMOL/L — SIGNIFICANT CHANGE UP (ref 98–107)
CO2 SERPL-SCNC: 21 MMOL/L — LOW (ref 22–31)
CREAT SERPL-MCNC: 1.19 MG/DL — SIGNIFICANT CHANGE UP (ref 0.5–1.3)
GLUCOSE SERPL-MCNC: 95 MG/DL — SIGNIFICANT CHANGE UP (ref 70–99)
HCT VFR BLD CALC: 37.7 % — LOW (ref 39–50)
HGB BLD-MCNC: 12 G/DL — LOW (ref 13–17)
MAGNESIUM SERPL-MCNC: 1.7 MG/DL — SIGNIFICANT CHANGE UP (ref 1.6–2.6)
MCHC RBC-ENTMCNC: 26.2 PG — LOW (ref 27–34)
MCHC RBC-ENTMCNC: 31.8 % — LOW (ref 32–36)
MCV RBC AUTO: 82.3 FL — SIGNIFICANT CHANGE UP (ref 80–100)
NRBC # FLD: 0 K/UL — SIGNIFICANT CHANGE UP (ref 0–0)
PHOSPHATE SERPL-MCNC: 2.5 MG/DL — SIGNIFICANT CHANGE UP (ref 2.5–4.5)
PLATELET # BLD AUTO: 190 K/UL — SIGNIFICANT CHANGE UP (ref 150–400)
PMV BLD: 11.5 FL — SIGNIFICANT CHANGE UP (ref 7–13)
POTASSIUM SERPL-MCNC: 3.8 MMOL/L — SIGNIFICANT CHANGE UP (ref 3.5–5.3)
POTASSIUM SERPL-SCNC: 3.8 MMOL/L — SIGNIFICANT CHANGE UP (ref 3.5–5.3)
RBC # BLD: 4.58 M/UL — SIGNIFICANT CHANGE UP (ref 4.2–5.8)
RBC # FLD: 16.5 % — HIGH (ref 10.3–14.5)
SODIUM SERPL-SCNC: 141 MMOL/L — SIGNIFICANT CHANGE UP (ref 135–145)
VIT B12 SERPL-MCNC: 734 PG/ML — SIGNIFICANT CHANGE UP (ref 200–900)
WBC # BLD: 10.33 K/UL — SIGNIFICANT CHANGE UP (ref 3.8–10.5)
WBC # FLD AUTO: 10.33 K/UL — SIGNIFICANT CHANGE UP (ref 3.8–10.5)

## 2020-09-04 PROCEDURE — 99233 SBSQ HOSP IP/OBS HIGH 50: CPT

## 2020-09-04 PROCEDURE — 99232 SBSQ HOSP IP/OBS MODERATE 35: CPT | Mod: GC

## 2020-09-04 PROCEDURE — 99232 SBSQ HOSP IP/OBS MODERATE 35: CPT

## 2020-09-04 RX ORDER — SENNA PLUS 8.6 MG/1
2 TABLET ORAL AT BEDTIME
Refills: 0 | Status: DISCONTINUED | OUTPATIENT
Start: 2020-09-04 | End: 2020-09-08

## 2020-09-04 RX ADMIN — PANTOPRAZOLE SODIUM 40 MILLIGRAM(S): 20 TABLET, DELAYED RELEASE ORAL at 05:37

## 2020-09-04 RX ADMIN — AMLODIPINE BESYLATE 5 MILLIGRAM(S): 2.5 TABLET ORAL at 05:37

## 2020-09-04 RX ADMIN — Medication 325 MILLIGRAM(S): at 13:04

## 2020-09-04 RX ADMIN — Medication 8 MILLIGRAM(S): at 21:29

## 2020-09-04 RX ADMIN — ATORVASTATIN CALCIUM 10 MILLIGRAM(S): 80 TABLET, FILM COATED ORAL at 21:29

## 2020-09-04 NOTE — DISCHARGE NOTE PROVIDER - PROVIDER TOKENS
FREE:[LAST:[oncology - [facility]],FIRST:[duncan is to call patient with appointment],PHONE:[phone]],FAX:[(   )    -]] FREE:[LAST:[oncology - [facility]],FIRST:[duncan is to call patient with appointment],PHONE:[phone]],FAX:[(   )    -]],PROVIDER:[TOKEN:[[id number]]]

## 2020-09-04 NOTE — PROGRESS NOTE ADULT - ASSESSMENT
Impression:  1) Dysphagia - with esophogeal mass seen on EGD biopsies pending transferred for esophogeal stent now s/p EGD with stent placement tolerating diet now biopsies has returned adenocarcinoma oncology following    Recommendations:   - f/u oncology recommendations   - can continue full liquids today    - supportive per primary    Olivia Rico  Gastroenterology Fellow  Pager x [phone] or [phone]  (From 8 am - 5 pm or on weekends and holidays please page GI on call at [phone])

## 2020-09-04 NOTE — PROGRESS NOTE ADULT - ASSESSMENT
87 y/o M with a history of essential tremor, prostate CA, repaired carotid stenosis, anxiety, hypertension, and GERD transferred from [facility] for management of esophageal mass.  Pt presented to [place] with 1.5-2 weeks of dysphagia and odynophagia, and at times difficulty managing secretions.  Pt had EGD showing esophageal mass which was biopsied.  He was transferred to [facility] for evaluation for placement of esophageal stent.  Pt currently reports no complaints.  No nausea, abd pain, chest pain, fever, or cough.   87 y/o M with a history of essential tremor, prostate CA, repaired carotid stenosis, anxiety, hypertension, and GERD transferred from [facility] for management of esophageal mass causing obstruction.    esophageal mass- r/o malignancy 85 y/o M with a history of essential tremor, prostate CA, repaired carotid stenosis, anxiety, hypertension, and GERD transferred from [facility] for management of esophageal mass.  Pt presented to [place] with 1.5-2 weeks of dysphagia and odynophagia, and at times difficulty managing secretions.  Pt had EGD showing esophageal mass which was biopsied.  He was transferred to [facility] for evaluation for placement of esophageal stent.  Pt currently reports no complaints.  No nausea, abd pain, chest pain, fever, or cough.   85 y/o M with a history of essential tremor, prostate CA, repaired carotid stenosis, anxiety, hypertension, and GERD transferred from [facility] for management of esophageal mass causing obstruction.    esophageal mass- bxp 9/1- Adenocarcinoma   Await Mri then home to f/u with Oncology

## 2020-09-04 NOTE — PHYSICAL THERAPY INITIAL EVALUATION ADULT - GENERAL OBSERVATIONS, REHAB EVAL
Consult received, chart reviewed. Patient received seated in chair, no apparent distress, wife present.

## 2020-09-04 NOTE — PROGRESS NOTE ADULT - ASSESSMENT
ASSESSMENT:  86 year old with a history of essential tremor, prostate CA, repaired carotid stenosis, anxiety, hypertension, and GERD transferred from [place] to [facility] with 2 week history of difficulty swallowing and excessive saliva n/w diagnosis of obstructuve esophageal GE junction mass with pathology significant for moderately differentiated adenocarcinoma. Palliative care discussing management with grandchildren and seem to want to pursue treatment. His CA 19,9 and CEA were normal and is pending MRI abdomen to characterize liver lesion which is possibly metastatic.       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

## 2020-09-04 NOTE — PROGRESS NOTE ADULT - ATTENDING COMMENTS
Await MRI Await MRI  esophageal mass- bxp 9/1- Adenocarcinoma   Await Mri then home to f/u with Oncology / surgery  Oncolgy from [facility] will call family with appointment.    possible home after Mri done today or weekend  60 min to coordinate d/c

## 2020-09-04 NOTE — PROGRESS NOTE ADULT - PROBLEM SELECTOR PLAN 1
- esophageal stent doneby Gi 9/1  Advanced to Full liquid diet toda  - IVF  GI greatly appreciated  Onc consulted- ct A/p with contrast today - esophageal stent done by Gi 9/1  D/w Gi - c/w  Full liquid diet , will add enures TID  Out patient f/u with GI  - IVF  GI greatly appreciated- out patient f/u with Gi as to advancing diet with esophageal stent in place  await mri of Liver lesion  Out patient f/u with Oncology  f/u path

## 2020-09-04 NOTE — DISCHARGE NOTE PROVIDER - NSDCCPCAREPLAN_GEN_ALL_CORE_FT
PRINCIPAL DISCHARGE DIAGNOSIS  Diagnosis: Esophageal mass  Assessment and Plan of Treatment: You had an esophageal stent placed on 9/1/20. You will need to continue Full liquid diet. Follow up with GI in 1 week, call to schedule an appointment.  Follow up with Oncology at [facility], You will be called with an appointment.      SECONDARY DISCHARGE DIAGNOSES  Diagnosis: Dysphagia  Assessment and Plan of Treatment: continue Fulliquid diet. Follow up with GI doctor    Diagnosis: Essential hypertension  Assessment and Plan of Treatment: continue amlodipine

## 2020-09-04 NOTE — PROGRESS NOTE ADULT - ASSESSMENT
Patient is a 85 y/o M with a history of essential tremor, prostate CA, carotid stenosis s/p repair, previous TIA, ARIANNA, essential hypertension, and GERD who was transferred from [facility] for management of esophageal mass.

## 2020-09-04 NOTE — PROGRESS NOTE ADULT - ATTENDING COMMENTS
Pt seen with fellow.  Agree with above.  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  Pt and family want to pursue dmt at this time.  Symptoms under control.  Will sign off. Please reconsult as needed.  [id number]

## 2020-09-04 NOTE — DISCHARGE NOTE PROVIDER - CARE PROVIDER_API CALL
oncology - [facility], [facility] is to call patient with appointment  Phone: [phone]  Fax: (   )    -  Follow Up Time: oncology - [facility], [facility] is to call patient with appointment  Phone: [phone]  Fax: (   )    -  Follow Up Time:     Mina Michele  GASTROENTEROLOGY  [address] [place] [address], [address] [address]  Phone: [phone]  Fax: [phone]  Follow Up Time:

## 2020-09-04 NOTE — DISCHARGE NOTE PROVIDER - NSDCMRMEDTOKEN_GEN_ALL_CORE_FT
amLODIPine 5 mg oral tablet: 1 tab(s) orally once a day  aspirin 325 mg oral tablet: 1 tab(s) orally once a day  atorvastatin 10 mg oral tablet: 1 tab(s) orally once a day (at bedtime)  Dextrose 5% with 0.45% NaCl intravenous solution: 1000 milliliter(s) intravenous   doxazosin 8 mg oral tablet: 1 tab(s) orally once a day (at bedtime)  heparin: 5000 unit(s) subcutaneous 3 times a day  ondansetron 2 mg/mL injectable solution: 4  injectable every 6 hours, As Needed  pantoprazole 40 mg oral delayed release tablet: 1 tab(s) orally once a day (before a meal)  propranolol 60 mg oral tablet: 1 tab(s) orally 2 times a day amLODIPine 5 mg oral tablet: 1 tab(s) orally once a day  aspirin 325 mg oral tablet: 1 tab(s) orally once a day  atorvastatin 10 mg oral tablet: 1 tab(s) orally once a day (at bedtime)  doxazosin 8 mg oral tablet: 1 tab(s) orally once a day (at bedtime)  pantoprazole 40 mg oral delayed release tablet: 1 tab(s) orally once a day (before a meal)  polyethylene glycol 3350 oral powder for reconstitution: 17 gram(s) orally once a day  propranolol 60 mg oral tablet: 1 tab(s) orally 2 times a day  senna oral tablet: 2 tab(s) orally once a day (at bedtime) ALPRAZolam 0.25 mg oral tablet: 1 tab(s) orally 2 times a day, As Needed  amLODIPine 5 mg oral tablet: 1 tab(s) orally once a day  aspirin 325 mg oral tablet: 1 tab(s) orally once a day  lovastatin 20 mg oral tablet: 1 tab(s) orally once a day  omeprazole 20 mg oral delayed release capsule: 1 cap(s) orally once a day  polyethylene glycol 3350 oral powder for reconstitution: 17 gram(s) orally once a day  propranolol 60 mg oral tablet: 1 tab(s) orally 2 times a day  senna oral tablet: 2 tab(s) orally once a day (at bedtime)  terazosin 10 mg oral capsule: 1 cap(s) orally once a day (at bedtime)

## 2020-09-04 NOTE — DISCHARGE NOTE PROVIDER - HOSPITAL COURSE
85 y/o M with a history of essential tremor, prostate CA, repaired carotid stenosis, anxiety, hypertension, and GERD transferred from [facility] for management of esophageal mass.  Pt presented to [facility] with 1.5-2 weeks of dysphagia and odynophagia, and at times difficulty managing secretions.  Pt had EGD showing esophageal mass which was biopsied.  He was transferred to [facility] for evaluation for placement of esophageal stent.  Pt currently reports no complaints.  No nausea, abd pain, chest pain, fever, or cough.            HOSPITAL COURSE:    Patient was seen by GI and Stent placed to [facility] on 20 and Bxp done    Ct of c/a/p was obtained for cancer staging    he was started on Clear liquid diet and advanced to full liquid  with ensures.    Pathology 20 Bxp- Adenocarcinoma'    Ct of a/l - liver lesion but rec MRI    mri was ord and showed..........    he will be d/c home on Full liquid diet with with out patient f/u with Oncology/ Gi/ PCP and Possibly surgery 85 y/o M with a history of essential tremor, prostate CA, repaired carotid stenosis, anxiety, hypertension, and GERD transferred from [facility] for management of esophageal mass.  Pt presented to [facility] with 1.5-2 weeks of dysphagia and odynophagia, and at times difficulty managing secretions.  Pt had EGD showing esophageal mass which was biopsied.  He was transferred to [facility] for evaluation for placement of esophageal stent.  Pt currently reports no complaints.  No nausea, abd pain, chest pain, fever, or cough.            HOSPITAL COURSE:    Patient was seen by GI and Stent placed to [facility] on 20 and Bxp done    Ct of c/a/p was obtained for cancer staging    he was started on Clear liquid diet and advanced to full liquid  with ensures.    Pathology 20 Bxp- Adenocarcinoma    < from: CT Abdomen and Pelvis w/ IV Cont (20 @ 13:38) >        IMPRESSION:    Redemonstrated masslike thickening of the distal esophagus, highly suspicious for neoplasm, now status post esophageal stent placement. Correlate with pending pathology.    About 6 indeterminate right lobe liver lesions measuring up to 1 cm in segment 6, raises concern for metastatic disease. Recommend further evaluation with dedicated contrast enhanced liver protocol MR.    No lymphadenopathy.        Ct of a/l - liver lesion but rec MRI    MRI was ord and showed..........    he will be d/c home on Full liquid diet with with out patient f/u with Oncology/ Gi/ PCP and Possibly surgery

## 2020-09-05 LAB
ANION GAP SERPL CALC-SCNC: 11 MMO/L — SIGNIFICANT CHANGE UP (ref 7–14)
BUN SERPL-MCNC: 26 MG/DL — HIGH (ref 7–23)
CALCIUM SERPL-MCNC: 9 MG/DL — SIGNIFICANT CHANGE UP (ref 8.4–10.5)
CHLORIDE SERPL-SCNC: 104 MMOL/L — SIGNIFICANT CHANGE UP (ref 98–107)
CO2 SERPL-SCNC: 24 MMOL/L — SIGNIFICANT CHANGE UP (ref 22–31)
CREAT SERPL-MCNC: 1.36 MG/DL — HIGH (ref 0.5–1.3)
GLUCOSE SERPL-MCNC: 116 MG/DL — HIGH (ref 70–99)
HCT VFR BLD CALC: 37.3 % — LOW (ref 39–50)
HGB BLD-MCNC: 11.8 G/DL — LOW (ref 13–17)
MAGNESIUM SERPL-MCNC: 1.7 MG/DL — SIGNIFICANT CHANGE UP (ref 1.6–2.6)
MCHC RBC-ENTMCNC: 25.5 PG — LOW (ref 27–34)
MCHC RBC-ENTMCNC: 31.6 % — LOW (ref 32–36)
MCV RBC AUTO: 80.7 FL — SIGNIFICANT CHANGE UP (ref 80–100)
NRBC # FLD: 0 K/UL — SIGNIFICANT CHANGE UP (ref 0–0)
PHOSPHATE SERPL-MCNC: 2.1 MG/DL — LOW (ref 2.5–4.5)
PLATELET # BLD AUTO: 186 K/UL — SIGNIFICANT CHANGE UP (ref 150–400)
PMV BLD: 10.7 FL — SIGNIFICANT CHANGE UP (ref 7–13)
POTASSIUM SERPL-MCNC: 4.1 MMOL/L — SIGNIFICANT CHANGE UP (ref 3.5–5.3)
POTASSIUM SERPL-SCNC: 4.1 MMOL/L — SIGNIFICANT CHANGE UP (ref 3.5–5.3)
RBC # BLD: 4.62 M/UL — SIGNIFICANT CHANGE UP (ref 4.2–5.8)
RBC # FLD: 16.2 % — HIGH (ref 10.3–14.5)
SODIUM SERPL-SCNC: 139 MMOL/L — SIGNIFICANT CHANGE UP (ref 135–145)
WBC # BLD: 9.8 K/UL — SIGNIFICANT CHANGE UP (ref 3.8–10.5)
WBC # FLD AUTO: 9.8 K/UL — SIGNIFICANT CHANGE UP (ref 3.8–10.5)

## 2020-09-05 PROCEDURE — 74182 MRI ABDOMEN W/CONTRAST: CPT | Mod: 26

## 2020-09-05 PROCEDURE — 99233 SBSQ HOSP IP/OBS HIGH 50: CPT

## 2020-09-05 RX ORDER — POLYETHYLENE GLYCOL 3350 17 G/17G
17 POWDER, FOR SOLUTION ORAL DAILY
Refills: 0 | Status: DISCONTINUED | OUTPATIENT
Start: 2020-09-05 | End: 2020-09-08

## 2020-09-05 RX ORDER — SODIUM CHLORIDE 9 MG/ML
1000 INJECTION INTRAMUSCULAR; INTRAVENOUS; SUBCUTANEOUS
Refills: 0 | Status: DISCONTINUED | OUTPATIENT
Start: 2020-09-05 | End: 2020-09-08

## 2020-09-05 RX ORDER — POTASSIUM PHOSPHATE, MONOBASIC POTASSIUM PHOSPHATE, DIBASIC 236; 224 MG/ML; MG/ML
15 INJECTION, SOLUTION INTRAVENOUS ONCE
Refills: 0 | Status: COMPLETED | OUTPATIENT
Start: 2020-09-05 | End: 2020-09-05

## 2020-09-05 RX ORDER — HALOPERIDOL DECANOATE 100 MG/ML
0.5 INJECTION INTRAMUSCULAR ONCE
Refills: 0 | Status: COMPLETED | OUTPATIENT
Start: 2020-09-05 | End: 2020-09-05

## 2020-09-05 RX ADMIN — Medication 8 MILLIGRAM(S): at 21:59

## 2020-09-05 RX ADMIN — POTASSIUM PHOSPHATE, MONOBASIC POTASSIUM PHOSPHATE, DIBASIC 62.5 MILLIMOLE(S): 236; 224 INJECTION, SOLUTION INTRAVENOUS at 13:08

## 2020-09-05 RX ADMIN — SENNA PLUS 2 TABLET(S): 8.6 TABLET ORAL at 21:17

## 2020-09-05 RX ADMIN — PANTOPRAZOLE SODIUM 40 MILLIGRAM(S): 20 TABLET, DELAYED RELEASE ORAL at 06:49

## 2020-09-05 RX ADMIN — HALOPERIDOL DECANOATE 0.5 MILLIGRAM(S): 100 INJECTION INTRAMUSCULAR at 22:05

## 2020-09-05 RX ADMIN — Medication 325 MILLIGRAM(S): at 13:08

## 2020-09-05 RX ADMIN — ATORVASTATIN CALCIUM 10 MILLIGRAM(S): 80 TABLET, FILM COATED ORAL at 21:20

## 2020-09-05 RX ADMIN — HALOPERIDOL DECANOATE 0.5 MILLIGRAM(S): 100 INJECTION INTRAMUSCULAR at 06:35

## 2020-09-05 RX ADMIN — SODIUM CHLORIDE 75 MILLILITER(S): 9 INJECTION INTRAMUSCULAR; INTRAVENOUS; SUBCUTANEOUS at 13:33

## 2020-09-05 RX ADMIN — POLYETHYLENE GLYCOL 3350 17 GRAM(S): 17 POWDER, FOR SOLUTION ORAL at 22:15

## 2020-09-05 RX ADMIN — AMLODIPINE BESYLATE 5 MILLIGRAM(S): 2.5 TABLET ORAL at 06:47

## 2020-09-05 RX ADMIN — HALOPERIDOL DECANOATE 0.5 MILLIGRAM(S): 100 INJECTION INTRAMUSCULAR at 01:33

## 2020-09-05 NOTE — PROGRESS NOTE ADULT - ASSESSMENT
85 y/o M with a history of essential tremor, prostate CA, repaired carotid stenosis, anxiety, hypertension, and GERD transferred from [facility] for management of esophageal mass causing obstruction.    esophageal mass- bxp 9/1- Adenocarcinoma   Await Mri then home to f/u with Oncology

## 2020-09-05 NOTE — PROGRESS NOTE ADULT - PROBLEM SELECTOR PLAN 1
- esophageal stent done by Gi 9/1  D/w Gi - c/w  Full liquid diet  Out patient f/u with GI  - IVF  - await mri of Liver lesion  - Out patient f/u with Oncology

## 2020-09-05 NOTE — CHART NOTE - NSCHARTNOTEFT_GEN_A_CORE
Called MRI they will take patient today. Safety form filled in chart. Pt to be   given medication 30 minutes prior to going down.

## 2020-09-05 NOTE — PROGRESS NOTE ADULT - ASSESSMENT
ASSESSMENT:  86 year old with a history of essential tremor, prostate CA, repaired carotid stenosis, anxiety, hypertension, and GERD transferred from [place] to [facility] with 2 week history of difficulty swallowing and excessive saliva n/w diagnosis of obstructuve esophageal GE junction mass with pathology significant for moderately differentiated adenocarcinoma. Palliative care discussing management with grandchildren and seem to want to pursue treatment. His CA 19,9 and CEA were normal and is pending MRI abdomen to characterize liver lesion which is possibly metastatic.       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

## 2020-09-06 DIAGNOSIS — I82.409 ACUTE EMBOLISM AND THROMBOSIS OF UNSPECIFIED DEEP VEINS OF UNSPECIFIED LOWER EXTREMITY: ICD-10-CM

## 2020-09-06 LAB
ANION GAP SERPL CALC-SCNC: 15 MMO/L — HIGH (ref 7–14)
BUN SERPL-MCNC: 19 MG/DL — SIGNIFICANT CHANGE UP (ref 7–23)
CALCIUM SERPL-MCNC: 8.9 MG/DL — SIGNIFICANT CHANGE UP (ref 8.4–10.5)
CO2 SERPL-SCNC: 22 MMOL/L — SIGNIFICANT CHANGE UP (ref 22–31)
CREAT SERPL-MCNC: 1.25 MG/DL — SIGNIFICANT CHANGE UP (ref 0.5–1.3)
GLUCOSE SERPL-MCNC: 111 MG/DL — HIGH (ref 70–99)
HCT VFR BLD CALC: 38.5 % — LOW (ref 39–50)
HGB BLD-MCNC: 12.1 G/DL — LOW (ref 13–17)
MAGNESIUM SERPL-MCNC: 1.6 MG/DL — SIGNIFICANT CHANGE UP (ref 1.6–2.6)
MCHC RBC-ENTMCNC: 25.9 PG — LOW (ref 27–34)
MCHC RBC-ENTMCNC: 31.4 % — LOW (ref 32–36)
MCV RBC AUTO: 82.4 FL — SIGNIFICANT CHANGE UP (ref 80–100)
NRBC # FLD: 0 K/UL — SIGNIFICANT CHANGE UP (ref 0–0)
PHOSPHATE SERPL-MCNC: 3.3 MG/DL — SIGNIFICANT CHANGE UP (ref 2.5–4.5)
PLATELET # BLD AUTO: 221 K/UL — SIGNIFICANT CHANGE UP (ref 150–400)
PMV BLD: 11.1 FL — SIGNIFICANT CHANGE UP (ref 7–13)
RBC # BLD: 4.67 M/UL — SIGNIFICANT CHANGE UP (ref 4.2–5.8)
RBC # FLD: 16.4 % — HIGH (ref 10.3–14.5)
SODIUM SERPL-SCNC: 140 MMOL/L — SIGNIFICANT CHANGE UP (ref 135–145)
WBC # BLD: 9.12 K/UL — SIGNIFICANT CHANGE UP (ref 3.8–10.5)
WBC # FLD AUTO: 9.12 K/UL — SIGNIFICANT CHANGE UP (ref 3.8–10.5)

## 2020-09-06 PROCEDURE — 99233 SBSQ HOSP IP/OBS HIGH 50: CPT

## 2020-09-06 RX ADMIN — Medication 325 MILLIGRAM(S): at 11:13

## 2020-09-06 RX ADMIN — Medication 8 MILLIGRAM(S): at 21:51

## 2020-09-06 RX ADMIN — AMLODIPINE BESYLATE 5 MILLIGRAM(S): 2.5 TABLET ORAL at 06:33

## 2020-09-06 RX ADMIN — ATORVASTATIN CALCIUM 10 MILLIGRAM(S): 80 TABLET, FILM COATED ORAL at 21:51

## 2020-09-06 RX ADMIN — PANTOPRAZOLE SODIUM 40 MILLIGRAM(S): 20 TABLET, DELAYED RELEASE ORAL at 06:33

## 2020-09-06 NOTE — PROGRESS NOTE ADULT - ASSESSMENT
87 y/o M with a history of essential tremor, prostate CA, repaired carotid stenosis, anxiety, hypertension, and GERD transferred from [facility] for management of esophageal mass causing obstruction.    esophageal mass- bxp 9/1- Adenocarcinoma   Await Mri then home to f/u with Oncology

## 2020-09-06 NOTE — PROGRESS NOTE ADULT - PROBLEM SELECTOR PLAN 1
- esophageal stent done by Gi 9/1  D/w Gi - c/w  Full liquid diet  Out patient f/u with GI  - Possible mets to liver on MRI.  - Will need f/u with Oncology

## 2020-09-06 NOTE — PROGRESS NOTE ADULT - PROBLEM SELECTOR PLAN 5
- Patient has finding of questionable right common iliac vein thrombosis on MRI report. Discussed with Radiology. Will order abd doppler and to include evaluation of RLE common iliac vein.

## 2020-09-06 NOTE — CHART NOTE - NSCHARTNOTEFT_GEN_A_CORE
Called by Radiologist there is a questionable thrombus in the common iliac vein and  a US is recommended for better assessment.

## 2020-09-07 LAB
ANION GAP SERPL CALC-SCNC: 14 MMO/L — SIGNIFICANT CHANGE UP (ref 7–14)
BUN SERPL-MCNC: 18 MG/DL — SIGNIFICANT CHANGE UP (ref 7–23)
CALCIUM SERPL-MCNC: 9.2 MG/DL — SIGNIFICANT CHANGE UP (ref 8.4–10.5)
CHLORIDE SERPL-SCNC: 101 MMOL/L — SIGNIFICANT CHANGE UP (ref 98–107)
CO2 SERPL-SCNC: 24 MMOL/L — SIGNIFICANT CHANGE UP (ref 22–31)
CREAT SERPL-MCNC: 1.22 MG/DL — SIGNIFICANT CHANGE UP (ref 0.5–1.3)
GLUCOSE SERPL-MCNC: 100 MG/DL — HIGH (ref 70–99)
HCT VFR BLD CALC: 37 % — LOW (ref 39–50)
HGB BLD-MCNC: 11.7 G/DL — LOW (ref 13–17)
MAGNESIUM SERPL-MCNC: 1.6 MG/DL — SIGNIFICANT CHANGE UP (ref 1.6–2.6)
MCHC RBC-ENTMCNC: 26 PG — LOW (ref 27–34)
MCHC RBC-ENTMCNC: 31.6 % — LOW (ref 32–36)
MCV RBC AUTO: 82.2 FL — SIGNIFICANT CHANGE UP (ref 80–100)
NRBC # FLD: 0 K/UL — SIGNIFICANT CHANGE UP (ref 0–0)
PHOSPHATE SERPL-MCNC: 3.1 MG/DL — SIGNIFICANT CHANGE UP (ref 2.5–4.5)
PLATELET # BLD AUTO: 222 K/UL — SIGNIFICANT CHANGE UP (ref 150–400)
PMV BLD: 11.9 FL — SIGNIFICANT CHANGE UP (ref 7–13)
POTASSIUM SERPL-MCNC: 3.8 MMOL/L — SIGNIFICANT CHANGE UP (ref 3.5–5.3)
POTASSIUM SERPL-SCNC: 3.8 MMOL/L — SIGNIFICANT CHANGE UP (ref 3.5–5.3)
RBC # BLD: 4.5 M/UL — SIGNIFICANT CHANGE UP (ref 4.2–5.8)
RBC # FLD: 16.1 % — HIGH (ref 10.3–14.5)
SODIUM SERPL-SCNC: 139 MMOL/L — SIGNIFICANT CHANGE UP (ref 135–145)
WBC # BLD: 8.66 K/UL — SIGNIFICANT CHANGE UP (ref 3.8–10.5)
WBC # FLD AUTO: 8.66 K/UL — SIGNIFICANT CHANGE UP (ref 3.8–10.5)

## 2020-09-07 PROCEDURE — 99233 SBSQ HOSP IP/OBS HIGH 50: CPT

## 2020-09-07 PROCEDURE — 93976 VASCULAR STUDY: CPT | Mod: 26

## 2020-09-07 RX ADMIN — PANTOPRAZOLE SODIUM 40 MILLIGRAM(S): 20 TABLET, DELAYED RELEASE ORAL at 06:27

## 2020-09-07 RX ADMIN — Medication 8 MILLIGRAM(S): at 21:28

## 2020-09-07 RX ADMIN — AMLODIPINE BESYLATE 5 MILLIGRAM(S): 2.5 TABLET ORAL at 06:27

## 2020-09-07 RX ADMIN — HALOPERIDOL DECANOATE 0.5 MILLIGRAM(S): 100 INJECTION INTRAMUSCULAR at 21:29

## 2020-09-07 RX ADMIN — ATORVASTATIN CALCIUM 10 MILLIGRAM(S): 80 TABLET, FILM COATED ORAL at 21:29

## 2020-09-07 RX ADMIN — SENNA PLUS 2 TABLET(S): 8.6 TABLET ORAL at 21:29

## 2020-09-07 RX ADMIN — Medication 325 MILLIGRAM(S): at 12:10

## 2020-09-07 NOTE — PROGRESS NOTE ADULT - ASSESSMENT
87 y/o M with a history of essential tremor, prostate CA, repaired carotid stenosis, anxiety, hypertension, and GERD transferred from [facility] for management of esophageal mass causing obstruction.    esophageal mass- bxp 9/1- Adenocarcinoma   Await Mri then home to f/u with Oncology

## 2020-09-08 ENCOUNTER — TRANSCRIPTION ENCOUNTER (OUTPATIENT)
Age: 85
End: 2020-09-08

## 2020-09-08 VITALS
TEMPERATURE: 98 F | RESPIRATION RATE: 16 BRPM | DIASTOLIC BLOOD PRESSURE: 55 MMHG | HEART RATE: 84 BPM | OXYGEN SATURATION: 100 % | SYSTOLIC BLOOD PRESSURE: 116 MMHG

## 2020-09-08 LAB
ANION GAP SERPL CALC-SCNC: 15 MMO/L — HIGH (ref 7–14)
BUN SERPL-MCNC: 18 MG/DL — SIGNIFICANT CHANGE UP (ref 7–23)
CALCIUM SERPL-MCNC: 8.9 MG/DL — SIGNIFICANT CHANGE UP (ref 8.4–10.5)
CHLORIDE SERPL-SCNC: 104 MMOL/L — SIGNIFICANT CHANGE UP (ref 98–107)
CO2 SERPL-SCNC: 23 MMOL/L — SIGNIFICANT CHANGE UP (ref 22–31)
CREAT SERPL-MCNC: 1.24 MG/DL — SIGNIFICANT CHANGE UP (ref 0.5–1.3)
GLUCOSE SERPL-MCNC: 100 MG/DL — HIGH (ref 70–99)
HCT VFR BLD CALC: 38.4 % — LOW (ref 39–50)
HGB BLD-MCNC: 11.6 G/DL — LOW (ref 13–17)
MAGNESIUM SERPL-MCNC: 1.6 MG/DL — SIGNIFICANT CHANGE UP (ref 1.6–2.6)
MCHC RBC-ENTMCNC: 25.5 PG — LOW (ref 27–34)
MCHC RBC-ENTMCNC: 30.2 % — LOW (ref 32–36)
MCV RBC AUTO: 84.4 FL — SIGNIFICANT CHANGE UP (ref 80–100)
NRBC # FLD: 0 K/UL — SIGNIFICANT CHANGE UP (ref 0–0)
PHOSPHATE SERPL-MCNC: 3.1 MG/DL — SIGNIFICANT CHANGE UP (ref 2.5–4.5)
PLATELET # BLD AUTO: 232 K/UL — SIGNIFICANT CHANGE UP (ref 150–400)
PMV BLD: 11.7 FL — SIGNIFICANT CHANGE UP (ref 7–13)
POTASSIUM SERPL-MCNC: 3.9 MMOL/L — SIGNIFICANT CHANGE UP (ref 3.5–5.3)
POTASSIUM SERPL-SCNC: 3.9 MMOL/L — SIGNIFICANT CHANGE UP (ref 3.5–5.3)
RBC # BLD: 4.55 M/UL — SIGNIFICANT CHANGE UP (ref 4.2–5.8)
RBC # FLD: 16.1 % — HIGH (ref 10.3–14.5)
SODIUM SERPL-SCNC: 142 MMOL/L — SIGNIFICANT CHANGE UP (ref 135–145)
WBC # BLD: 8.86 K/UL — SIGNIFICANT CHANGE UP (ref 3.8–10.5)
WBC # FLD AUTO: 8.86 K/UL — SIGNIFICANT CHANGE UP (ref 3.8–10.5)

## 2020-09-08 PROCEDURE — 99239 HOSP IP/OBS DSCHRG MGMT >30: CPT

## 2020-09-08 RX ORDER — SENNA PLUS 8.6 MG/1
2 TABLET ORAL
Qty: 60 | Refills: 0
Start: 2020-09-08 | End: 2020-10-07

## 2020-09-08 RX ORDER — POLYETHYLENE GLYCOL 3350 17 G/17G
17 POWDER, FOR SOLUTION ORAL
Qty: 1 | Refills: 0
Start: 2020-09-08

## 2020-09-08 RX ORDER — ALPRAZOLAM 0.25 MG
1 TABLET ORAL
Qty: 0 | Refills: 0 | DISCHARGE

## 2020-09-08 RX ORDER — PANTOPRAZOLE SODIUM 20 MG/1
1 TABLET, DELAYED RELEASE ORAL
Qty: 30 | Refills: 0
Start: 2020-09-08 | End: 2020-10-07

## 2020-09-08 RX ADMIN — AMLODIPINE BESYLATE 5 MILLIGRAM(S): 2.5 TABLET ORAL at 06:18

## 2020-09-08 RX ADMIN — PANTOPRAZOLE SODIUM 40 MILLIGRAM(S): 20 TABLET, DELAYED RELEASE ORAL at 06:18

## 2020-09-08 RX ADMIN — Medication 325 MILLIGRAM(S): at 11:09

## 2020-09-08 NOTE — PROGRESS NOTE ADULT - PROBLEM SELECTOR PLAN 5
- Patient has finding of questionable right common iliac vein thrombosis on MRI report. Discussed with Radiology. Will order abd doppler and to include evaluation of RLE common iliac vein. - Patient has finding of questionable right common iliac vein thrombosis on MRI report. Discussed with Radiology. abd doppler and to include evaluation of RLE common iliac vein.- NO DVT

## 2020-09-08 NOTE — PROGRESS NOTE ADULT - PROBLEM SELECTOR PROBLEM 2
Dysphagia
Essential hypertension
Essential hypertension
Dysphagia
Essential hypertension

## 2020-09-08 NOTE — PROGRESS NOTE ADULT - ASSESSMENT
ASSESSMENT:  86 year old with a history of essential tremor, prostate CA, repaired carotid stenosis, anxiety, hypertension, and GERD transferred from [place] to [facility] with 2 week history of difficulty swallowing and excessive saliva n/w diagnosis of obstructuve esophageal GE junction mass with pathology significant for moderately differentiated adenocarcinoma. Palliative care discussing management with grandchildren and seem to want to pursue treatment. His CA 19,9 and CEA were normal and is MRI shows metastatic lesion in liver. There was concerns for right common iliac DVT on MRI but was not seen on duplex ultrasound.      Plan:    - Esophageal stent w/ GI 9/1, tolerating full liquids  - Pathology came back as moderately differentiated esophageal adenocarcinoma, CA 19,9 and CEA normal  - Appreciate heme/onc recs, will need to determine further staging workup if malignancy, MRI consistent with liver lesion concerning more metastatic disease  - No acute surgical intervention as patient will likely need neoadjuvant chemoradiotherapy prior to any resection and no emergent need for surgery (perforation, obstruction)  - Will continue to follow    [id number] ASSESSMENT:  86 year old with a history of essential tremor, prostate CA, repaired carotid stenosis, anxiety, hypertension, and GERD transferred from [place] to [facility] with 2 week history of difficulty swallowing and excessive saliva n/w diagnosis of obstructuve esophageal GE junction mass with pathology significant for moderately differentiated adenocarcinoma. Palliative care discussing management with grandchildren and seem to want to pursue treatment. His CA 19,9 and CEA were normal and is MRI shows metastatic lesion in liver. There was concerns for right common iliac DVT on MRI but was not seen on duplex ultrasound.      Plan:    - Esophageal stent w/ GI 9/1, tolerating full liquids  - Pathology came back as moderately differentiated esophageal adenocarcinoma, CA 19,9 and CEA normal  - Appreciate heme/onc recs, will need to determine further staging workup if malignancy, MRI consistent with liver lesion concerning more metastatic disease  - No acute surgical intervention as patient will likely need neoadjuvant chemoradiotherapy prior to any resection and no emergent need for surgery (perforation, obstruction)  - Will sign off at this time, patient can follow up with Dr. Wong outpatient as needed if patient and patient's family decides to pursue treatment    [id number]

## 2020-09-08 NOTE — PROGRESS NOTE ADULT - PROBLEM SELECTOR PROBLEM 3
Esophageal mass
Essential tremor
Essential tremor
Esophageal mass
Essential tremor

## 2020-09-08 NOTE — PROGRESS NOTE ADULT - REASON FOR ADMISSION
esophageal mass

## 2020-09-08 NOTE — PROGRESS NOTE ADULT - PROBLEM SELECTOR PROBLEM 5
DVT (deep venous thrombosis)
Agitation
DVT (deep venous thrombosis)
DVT (deep venous thrombosis)
Agitation
Agitation

## 2020-09-08 NOTE — PROGRESS NOTE ADULT - PROBLEM SELECTOR PLAN 2
- Improve, on clear liquid diet, continue to monitor
- hold PO medications
Restarted amlodipine
- Improve, on clear liquid diet, continue to monitor
Restarted amlodipine

## 2020-09-08 NOTE — PROGRESS NOTE ADULT - PROBLEM SELECTOR PROBLEM 1
Delirium
Esophageal mass
Esophageal mass
Delirium
Esophageal mass

## 2020-09-08 NOTE — PROGRESS NOTE ADULT - PROVIDER SPECIALTY LIST ADULT
Anesthesia
Gastroenterology
Heme/Onc
Hospitalist
Palliative Care
Surgery
Palliative Care

## 2020-09-08 NOTE — DISCHARGE NOTE NURSING/CASE MANAGEMENT/SOCIAL WORK - PATIENT PORTAL LINK FT
You can access the FollowMyHealth Patient Portal offered by [facility] by registering at the following website: [email or web address]. By joining [employer]’s FollowMyHealth portal, you will also be able to view your health information using other applications (apps) compatible with our system.

## 2020-09-08 NOTE — PROGRESS NOTE ADULT - ASSESSMENT
87 y/o M with a history of essential tremor, prostate CA, repaired carotid stenosis, anxiety, hypertension, and GERD transferred from [facility] for management of esophageal mass causing obstruction.    esophageal mass- bxp 9/1- Adenocarcinoma   Await Mri then home to f/u with Oncology 85 y/o M with a history of essential tremor, prostate CA, repaired carotid stenosis, anxiety, hypertension, and GERD transferred from [facility] for management of esophageal mass causing obstruction.    esophageal mass- bxp 9/1- Adenocarcinoma   Await Mri then home to f/u with Oncology   < from: MR Abdomen w/ IV Cont (09.05.20 @ 19:41) >  IMPRESSION:  Very limited examination of the liver secondary to respiratory motion. Probable metastases segment 6 corresponding to findings on prior CT scan.  Distal esophageal thickening with partially visualized stent  Questionable right common iliac vein thrombus    < from: US Duplex Abdomen/Pelvis Limited (09.07.20 @ 18:17) >  IMPRESSION:  No evidence of common iliac vein thrombosis.

## 2020-09-08 NOTE — PROGRESS NOTE ADULT - SUBJECTIVE AND OBJECTIVE BOX
SUBJECTIVE AND OBJECTIVE:  INTERVAL HPI/OVERNIGHT EVENTS:  Patient was seen and examined at bedside. Patient state he doesnt have any pain, able to tolerate diet, no consitpation or diarrhea. Per nursing, no acute events overnight.     DNR on chart:   Allergies    No Known Allergies    Intolerances    MEDICATIONS  (STANDING):  amLODIPine   Tablet 5 milliGRAM(s) Oral daily  aspirin 325 milliGRAM(s) Oral daily  atorvastatin 10 milliGRAM(s) Oral at bedtime  doxazosin 8 milliGRAM(s) Oral at bedtime  pantoprazole    Tablet 40 milliGRAM(s) Oral before breakfast  propranolol 60 milliGRAM(s) Oral two times a day    MEDICATIONS  (PRN):  haloperidol     Tablet 0.5 milliGRAM(s) Oral every 6 hours PRN agitation  haloperidol    Injectable 0.5 milliGRAM(s) IntraMuscular every 6 hours PRN agitation  haloperidol    Injectable 0.5 milliGRAM(s) IV Push every 6 hours PRN agitation  haloperidol    Injectable 0.5 milliGRAM(s) IV Push once PRN agitation      ITEMS UNCHECKED ARE NOT PRESENT    PRESENT SYMPTOMS: [ ]Unable to obtain due to poor mentation   Source if other than patient:  [ ]Family   [ ]Team     Pain (Impact on QOL):    Location:  Minimal acceptable level (0-10 scale):                   Aggravating factors:  Quality:  Radiation:  Severity (0-10 scale):    Timing:    Dyspnea:                           [ ]Mild [ ]Moderate [ ]Severe  Anxiety:                             [ ]Mild [ ]Moderate [ ]Severe  Fatigue:                             [ ]Mild [ ]Moderate [ ]Severe  Nausea:                             [ ]Mild [ ]Moderate [ ]Severe  Loss of appetite:              [ ]Mild [ ]Moderate [ ]Severe  Constipation:                    [ ]Mild [ ]Moderate [ ]Severe    PAIN AD Score:	  [email or web address] (Ctrl + left click to view)    Other Symptoms:  [ ]All other review of systems negative     Karnofsky Performance Score/Palliative Performance Status Version 2:      70   %    http://palliative.info/resource_material/[email or web address].pdf    PHYSICAL EXAM:  Vital Signs Last 24 Hrs  T(C): 36.9 (03 Sep 2020 05:36), Max: 37.1 (02 Sep 2020 17:58)  T(F): 98.5 (03 Sep 2020 05:36), Max: 98.7 (02 Sep 2020 17:58)  HR: 79 (03 Sep 2020 05:36) (79 - 95)  BP: 140/69 (03 Sep 2020 05:36) (119/78 - 145/65)  BP(mean): --  RR: 19 (03 Sep 2020 05:36) (16 - 19)  SpO2: 99% (03 Sep 2020 05:36) (95% - 99%) I&O's Summary   GENERAL:  [x ]Alert  [x ]Oriented x 3  [ ]Lethargic  [ ]Cachexia  [ ]Unarousable  [x ]Verbal  [ ]Non-Verbal  Behavioral:   [ ] Anxiety  [ ] Delirium [ ] Agitation [ ] Other  HEENT:  [x ]Normal   [ ]Dry mouth   [ ]ET Tube/Trach  [ ]Oral lesions  PULMONARY:   [x ]Clear [ ]Tachypnea  [ ]Audible excessive secretions   [ ]Rhonchi        [ ]Right [ ]Left [ ]Bilateral  [ ]Crackles        [ ]Right [ ]Left [ ]Bilateral  [ ]Wheezing     [ ]Right [ ]Left [ ]Bilateral  CARDIOVASCULAR:    [x ]Regular [ ]Irregular [ ]Tachy  [ ]Carlos [ ]Murmur [ ]Other  GASTROINTESTINAL:  [x]Soft  [x ]Distended   [x ]+BS  [ ]Non tender [ ]Tender  [ ]PEG [ ]OGT/ NGT  Last BM:   GENITOURINARY:  [ ]Normal [ ] Incontinent   [ ]Oliguria/Anuria   [ ][place]  MUSCULOSKELETAL:   [x ]Normal   [ ]Weakness  [ ]Bed/Wheelchair bound [ ]Edema  NEUROLOGIC:   [x ]No focal deficits  [ ] Cognitive impairment  [ ] Dysphagia [ ]Dysarthria [ ] Paresis [ ]Other   SKIN:   [x ]Normal   [ ]Pressure ulcer(s)  [ ]Rash    CRITICAL CARE:  [ ] Shock Present  [ ]Septic [ ]Cardiogenic [ ]Neurologic [ ]Hypovolemic  [ ]  Vasopressors [ ]  Inotropes   [ ] Respiratory failure present  [ ] Acute  [ ] Chronic [ ] Hypoxic  [ ] Hypercarbic [ ] Other  [ ] Other organ failure     GRIEF   [ ] Yes  [x ] No    LABS:                        12.6   11.86 )-----------( 192      ( 03 Sep 2020 06:00 )             40.5   09-03    142  |  106  |  17  ----------------------------<  94  4.0   |  21<L>  |  1.27    Ca    8.9      03 Sep 2020 06:00  Phos  2.7     09-03  Mg     1.7     09-03          RADIOLOGY & ADDITIONAL STUDIES:    Protein Calorie Malnutrition Present: [ ] yes [ ] no  [ ] PPSV2 < or = 30%  [ ] significant weight loss [ ] poor nutritional intake [ ] anasarca [ ] catabolic state Albumin, Serum: 3.2 g/dL (08-31-20 @ 06:22)  Artificial Nutrition [ ]     REFERRALS:   [ ]Chaplaincy  [ ] Hospice  [ ]Child Life  [ ]Social Work  [ ]Case management [ ]Holistic Therapy   Goals of Care Document: PO Osborne (09-02-20 @ 14:54)  Goals of Care Conversation:   Participants:  · Participants  Family  · Spouse  Elena - Wife  · Relative  Grand-Child - Wisam    Advance Directives:  · Does patient have Advance Directive  No  · Do you want to complete the HCP and name a [place] Care Agent  No  · Does Patient Have a Surrogate  Yes  · Surrogate's Name  Elena - Wife  · Surrogate's Phone Number  [phone]  · Does the Patient have a Court Appointed Guardian ([id number])  No  · Caregiver:  no    Conversation Discussion:  · Conversation  Diagnosis; MOLST Discussed; Treatment Options  · Conversation Details  Called Wife at [phone] around 1PM. Wife, Elnea, defers to grandchild for making decisions. Per Wisam, patient's wife has some dementia and relies on her for medical decisions. Discussed hospital course and discussion about GOC we had with patient.    Patient stated that he wishes to go home if it's cancer, however wishes to be full code. After discussion with Grand-Daughter, she wishes we continue current therapy, grandfather should be full code, and further management after staging and biopsy pathology. If surgical intervention only would be available, would consider it.    Personal Advance Directives Treatment Guidelines:   Treatment Guidelines:  · Decision Maker  Surrogate  · Treatment Guidelines  Full Code  · Treatment Guideline Comments  Wishes everything to be done for now. Further discussions post biopsy results, staging based off CT findings, and treatment options.    Location of Discussion:   Duration of Advanced Care Planning Meeting:  · Time spent (in minutes)  15    Location of Discussion:  · Location of discussion  Telephone      Electronic Signatures:  Sanchez Gregory)   (Signed 02-Sep-2020 15:02)  	Authored: Goals of Care Conversation, Personal Advance Directives Treatment Guidelines, Location of Discussion  Catherine Ely ()   (Signed 02-Sep-2020 15:10)  	Co-Signer: Goals of Care Conversation, Personal Advance Directives Treatment Guidelines, Location of Discussion    Last Updated: 02-Sep-2020 15:10 by Catherine Ely ()
ANESTHESIA POSTOP CHECK    86y Male POSTOP DAY 2 S/P EGD, Stent    [x ] NO APPARENT ANESTHESIA COMPLICATIONS      Comments:
Chief Complaint:  Patient is a 86y old  Male who presents with a chief complaint of esophageal mass (01 Sep 2020 12:11)      Interval Events:   Patient with EGD with stent placement yesterday.  Afebrile and stable labs.      Allergies:  No Known Allergies      Hospital Medications:  amLODIPine   Tablet 5 milliGRAM(s) Oral daily  aspirin 325 milliGRAM(s) Oral daily  atorvastatin 10 milliGRAM(s) Oral at bedtime  doxazosin 8 milliGRAM(s) Oral at bedtime  haloperidol     Tablet 0.5 milliGRAM(s) Oral every 6 hours PRN  haloperidol    Injectable 0.5 milliGRAM(s) IntraMuscular every 6 hours PRN  haloperidol    Injectable 0.5 milliGRAM(s) IV Push every 6 hours PRN  haloperidol    Injectable 0.5 milliGRAM(s) IV Push once PRN  pantoprazole    Tablet 40 milliGRAM(s) Oral before breakfast  propranolol 60 milliGRAM(s) Oral two times a day  sodium chloride 0.9%. 1000 milliLiter(s) IV Continuous <Continuous>      PMHX/PSHX:  Prostate CA  GERD with apnea  Hypertension      Family history:  No pertinent family history in first degree relatives      ROS:  General: no night sweats, wt loss  CV: no pain, palpitation  Resp: no cough wheezing  Muscles: no weakness or pain  Endocrine: no polyuria, polydipsia, cold/heat intolerance  Heme: No petechia, ecchymosis    PHYSICAL EXAM:   GENERAL:  NAD  HEENT:  NC/AT,  conjunctivae clear, sclera -anicteric  CHEST:  Full & symmetric excursion, no increased  HEART:  Regular rhythm  ABDOMEN:  Soft, non-tender, non-distended, normoactive bowel sounds,  no masses ,no hepato-splenomegaly,   EXTREMITIES:  no cyanosis,clubbing or edema  SKIN:  No rash/erythema/ecchymoses/petechiae/wounds/abscess/warm/dry  NEURO:  Alert, oriented    Vital Signs:  Vital Signs Last 24 Hrs  T(C): 36.9 (02 Sep 2020 05:41), Max: 36.9 (02 Sep 2020 05:41)  T(F): 98.4 (02 Sep 2020 05:41), Max: 98.4 (02 Sep 2020 05:41)  HR: 85 (02 Sep 2020 05:41) (85 - 107)  BP: 148/74 (02 Sep 2020 05:41) (108/56 - 166/76)  BP(mean): 94 (01 Sep 2020 13:00) (94 - 94)  RR: 17 (02 Sep 2020 05:41) (16 - 22)  SpO2: 95% (02 Sep 2020 05:41) (95% - 100%)  Daily     Daily     LABS:                        11.6   8.47  )-----------( 183      ( 02 Sep 2020 06:15 )             37.9                     Imaging:
Chief Complaint:  Patient is a 86y old  Male who presents with a chief complaint of esophageal mass (02 Sep 2020 13:43)      Interval Events:   Patietn feeling well was able to tolerate liquids yesterday.  Mild leukocytosis this am to 11, afebrile    Allergies:  No Known Allergies      Hospital Medications:  amLODIPine   Tablet 5 milliGRAM(s) Oral daily  aspirin 325 milliGRAM(s) Oral daily  atorvastatin 10 milliGRAM(s) Oral at bedtime  doxazosin 8 milliGRAM(s) Oral at bedtime  haloperidol     Tablet 0.5 milliGRAM(s) Oral every 6 hours PRN  haloperidol    Injectable 0.5 milliGRAM(s) IntraMuscular every 6 hours PRN  haloperidol    Injectable 0.5 milliGRAM(s) IV Push every 6 hours PRN  haloperidol    Injectable 0.5 milliGRAM(s) IV Push once PRN  pantoprazole    Tablet 40 milliGRAM(s) Oral before breakfast  propranolol 60 milliGRAM(s) Oral two times a day      PMHX/PSHX:  Prostate CA  GERD with apnea  Hypertension      Family history:  No pertinent family history in first degree relatives      ROS:  General: no night sweats, wt loss  CV: no pain, palpitation  Resp: no cough wheezing  Muscles: no weakness or pain  Endocrine: no polyuria, polydipsia, cold/heat intolerance  Heme: No petechia, ecchymosis    PHYSICAL EXAM:   GENERAL:  NAD  HEENT:  NC/AT,  conjunctivae clear, sclera -anicteric  CHEST:  Full & symmetric excursion, no increased  HEART:  Regular rhythm  ABDOMEN:  Soft, non-tender, non-distended, normoactive bowel sounds,  no masses ,no hepato-splenomegaly,   EXTREMITIES:  no cyanosis,clubbing or edema  SKIN:  No rash/erythema/ecchymoses/petechiae/wounds/abscess/warm/dry  NEURO:  Alert, oriented    Vital Signs:  Vital Signs Last 24 Hrs  T(C): 36.9 (03 Sep 2020 05:36), Max: 37.1 (02 Sep 2020 17:58)  T(F): 98.5 (03 Sep 2020 05:36), Max: 98.7 (02 Sep 2020 17:58)  HR: 79 (03 Sep 2020 05:36) (79 - 95)  BP: 140/69 (03 Sep 2020 05:36) (119/78 - 145/65)  BP(mean): --  RR: 19 (03 Sep 2020 05:36) (16 - 19)  SpO2: 99% (03 Sep 2020 05:36) (95% - 99%)  Daily     Daily     LABS:                        12.6   11.86 )-----------( 192      ( 03 Sep 2020 06:00 )             40.5     09-03    142  |  106  |  17  ----------------------------<  94  4.0   |  21<L>  |  1.27    Ca    8.9      03 Sep 2020 06:00  Phos  2.7     09-03  Mg     1.7     09-03                Imaging:
Chief Complaint:  Patient is a 86y old  Male who presents with a chief complaint of esophageal mass (03 Sep 2020 17:26)      Interval Events:   Patient    Allergies:  No Known Allergies      Hospital Medications:  amLODIPine   Tablet 5 milliGRAM(s) Oral daily  aspirin 325 milliGRAM(s) Oral daily  atorvastatin 10 milliGRAM(s) Oral at bedtime  doxazosin 8 milliGRAM(s) Oral at bedtime  haloperidol     Tablet 0.5 milliGRAM(s) Oral every 6 hours PRN  haloperidol    Injectable 0.5 milliGRAM(s) IntraMuscular every 6 hours PRN  haloperidol    Injectable 0.5 milliGRAM(s) IV Push every 6 hours PRN  haloperidol    Injectable 0.5 milliGRAM(s) IV Push once PRN  pantoprazole    Tablet 40 milliGRAM(s) Oral before breakfast  propranolol 60 milliGRAM(s) Oral two times a day      PMHX/PSHX:  Prostate CA  GERD with apnea  Hypertension      Family history:  No pertinent family history in first degree relatives      ROS:  General: no night sweats, wt loss  CV: no pain, palpitation  Resp: no cough wheezing  Muscles: no weakness or pain  Endocrine: no polyuria, polydipsia, cold/heat intolerance  Heme: No petechia, ecchymosis    PHYSICAL EXAM:   GENERAL:  NAD  HEENT:  NC/AT,  conjunctivae clear, sclera -anicteric  CHEST:  Full & symmetric excursion, no increased  HEART:  Regular rhythm  ABDOMEN:  Soft, non-tender, non-distended, normoactive bowel sounds,  no masses ,no hepato-splenomegaly,   EXTREMITIES:  no cyanosis,clubbing or edema  SKIN:  No rash/erythema/ecchymoses/petechiae/wounds/abscess/warm/dry  NEURO:  Alert, oriented    Vital Signs:  Vital Signs Last 24 Hrs  T(C): 36.7 (04 Sep 2020 05:04), Max: 36.8 (03 Sep 2020 21:35)  T(F): 98.1 (04 Sep 2020 05:04), Max: 98.3 (03 Sep 2020 21:35)  HR: 86 (04 Sep 2020 05:04) (81 - 92)  BP: 122/67 (04 Sep 2020 05:04) (121/71 - 153/79)  BP(mean): --  RR: 18 (04 Sep 2020 05:04) (18 - 20)  SpO2: 96% (04 Sep 2020 05:04) (95% - 99%)  Daily     Daily     LABS:                        12.0   10.33 )-----------( 190      ( 04 Sep 2020 06:00 )             37.7     09-03    142  |  106  |  17  ----------------------------<  94  4.0   |  21<L>  |  1.27    Ca    8.9      03 Sep 2020 06:00  Phos  2.7     09-03  Mg     1.7     09-03                Imaging:
INTERVAL EVENTS:  No overnight event, no new complaint      REVIEW OF SYSTEMS:  All other review of systems is negative unless indicated above.    Vital Signs Last 24 Hrs  T(C): 36.5 (03 Sep 2020 15:04), Max: 37.1 (02 Sep 2020 17:58)  T(F): 97.7 (03 Sep 2020 15:04), Max: 98.7 (02 Sep 2020 17:58)  HR: 82 (03 Sep 2020 15:04) (79 - 95)  BP: 134/50 (03 Sep 2020 15:04) (134/50 - 145/65)  BP(mean): --  RR: 20 (03 Sep 2020 15:04) (17 - 20)  SpO2: 99% (03 Sep 2020 15:04) (95% - 99%)      PHYSICAL EXAM:   GENERAL: no acute distress  RESPIRATORY: LCTAB/L, no rhonchi, rales, or wheezing  CARDIOVASCULAR: RRR, no murmurs, gallops, rubs  ABDOMINAL: soft, non-tender, non-distended, positive bowel sounds   EXTREMITIES: no clubbing, cyanosis, or edema  NEUROLOGICAL: alert and oriented x 3,                        12.6   11.86 )-----------( 192      ( 03 Sep 2020 06:00 )             40.5     09-03    142  |  106  |  17  ----------------------------<  94  4.0   |  21<L>  |  1.27    Ca    8.9      03 Sep 2020 06:00  Phos  2.7     09-03  Mg     1.7     09-03          MEDICATIONS  (STANDING):  amLODIPine   Tablet 5 milliGRAM(s) Oral daily  aspirin 325 milliGRAM(s) Oral daily  atorvastatin 10 milliGRAM(s) Oral at bedtime  doxazosin 8 milliGRAM(s) Oral at bedtime  pantoprazole    Tablet 40 milliGRAM(s) Oral before breakfast  propranolol 60 milliGRAM(s) Oral two times a day
Patient is a 86y old  Male who presents with a chief complaint of esophageal mass (02 Sep 2020 10:52)      SUBJECTIVE / OVERNIGHT EVENTS:  Patient is Ox3- resting in bed- waiting for family to come    MEDICATIONS  (STANDING):  amLODIPine   Tablet 5 milliGRAM(s) Oral daily  aspirin 325 milliGRAM(s) Oral daily  atorvastatin 10 milliGRAM(s) Oral at bedtime  doxazosin 8 milliGRAM(s) Oral at bedtime  pantoprazole    Tablet 40 milliGRAM(s) Oral before breakfast  propranolol 60 milliGRAM(s) Oral two times a day    MEDICATIONS  (PRN):  haloperidol     Tablet 0.5 milliGRAM(s) Oral every 6 hours PRN agitation  haloperidol    Injectable 0.5 milliGRAM(s) IntraMuscular every 6 hours PRN agitation  haloperidol    Injectable 0.5 milliGRAM(s) IV Push every 6 hours PRN agitation  haloperidol    Injectable 0.5 milliGRAM(s) IV Push once PRN agitation      I&O's Summary    01 Sep 2020 07:01  -  02 Sep 2020 07:00  --------------------------------------------------------  IN: 0 mL / OUT: 400 mL / NET: -400 mL    Vital Signs Last 24 Hrs  T(C): 36.9 (02 Sep 2020 05:41), Max: 36.9 (02 Sep 2020 05:41)  T(F): 98.4 (02 Sep 2020 05:41), Max: 98.4 (02 Sep 2020 05:41)  HR: 85 (02 Sep 2020 05:41) (85 - 107)  BP: 148/74 (02 Sep 2020 05:41) (144/85 - 166/76)  BP(mean): --  RR: 17 (02 Sep 2020 05:41) (16 - 18)  SpO2: 95% (02 Sep 2020 05:41) (95% - 100%)    PHYSICAL EXAM:  Ox3  Full liquid diet  GENERAL: NAD, well-developed  HEAD:  Atraumatic, Normocephalic  EYES: EOMI, PERRLA, conjunctiva and sclera clear  NECK: Supple, No JVD  CHEST/LUNG: Clear to auscultation bilaterally; No wheeze  HEART: Regular rate and rhythm; No murmurs, rubs, or gallops  ABDOMEN: Soft, Nontender, Nondistended; Bowel sounds present  EXTREMITIES:  2+ Peripheral Pulses, No clubbing, cyanosis, or edema  PSYCH: AAOx3  NEUROLOGY: non-focal  SKIN: No rashes or lesions    LABS:                        11.6   8.47  )-----------( 183      ( 02 Sep 2020 06:15 )             37.9     09-02    140  |  105  |  14  ----------------------------<  89  3.8   |  22  |  1.27    Ca    8.7      02 Sep 2020 06:15  Phos  2.9     09-02  Mg     1.7     09-02        RADIOLOGY & ADDITIONAL TESTS:  < from: CT Head No Cont (09.01.20 @ 20:31) >  IMPRESSION:  1. No acute intracranial hemorrhage.  2. 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  3. 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  4. Complete hyperdense opacification of the right maxillary sinus which may indicate chronic sinusitis. Superimposed mucocele formation and/or fungal colonization are also possible.  5. Recommend further evaluation with a contrast enhanced brain MRI examination, if there are no clinical contraindications.      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 Recommend upper endoscopy for further evaluation.      < end of copied text >
Patient is a 86y old  Male who presents with a chief complaint of esophageal mass (05 Sep 2020 09:20)    SUBJECTIVE / OVERNIGHT EVENTS: No acute events overnight. Tolerating full liquid diet. Plan for MRI today.    MEDICATIONS  (STANDING):  amLODIPine   Tablet 5 milliGRAM(s) Oral daily  aspirin 325 milliGRAM(s) Oral daily  atorvastatin 10 milliGRAM(s) Oral at bedtime  doxazosin 8 milliGRAM(s) Oral at bedtime  pantoprazole    Tablet 40 milliGRAM(s) Oral before breakfast  potassium phosphate IVPB 15 milliMole(s) IV Intermittent once  propranolol 60 milliGRAM(s) Oral two times a day  senna 2 Tablet(s) Oral at bedtime  sodium chloride 0.9%. 1000 milliLiter(s) (75 mL/Hr) IV Continuous <Continuous>    MEDICATIONS  (PRN):  haloperidol     Tablet 0.5 milliGRAM(s) Oral every 6 hours PRN agitation  haloperidol    Injectable 0.5 milliGRAM(s) IntraMuscular every 6 hours PRN agitation  haloperidol    Injectable 0.5 milliGRAM(s) IV Push every 6 hours PRN agitation  haloperidol    Injectable 0.5 milliGRAM(s) IV Push once PRN agitation      CAPILLARY BLOOD GLUCOSE        I&O's Summary      T(C): 36.6 (09-05-20 @ 06:00), Max: 36.7 (09-04-20 @ 14:15)  HR: 84 (09-05-20 @ 06:00) (81 - 90)  BP: 134/72 (09-05-20 @ 06:00) (116/56 - 134/72)  RR: 16 (09-05-20 @ 06:00) (16 - 18)  SpO2: 95% (09-05-20 @ 06:00) (95% - 98%)  PHYSICAL EXAM:  GENERAL: NAD  HEAD:  Atraumatic, Normocephalic  EYES: EOMI, PERRLA, conjunctiva and sclera clear  NECK: Supple, No JVD  CHEST/LUNG: Clear to auscultation bilaterally; No wheeze  HEART: Regular rate and rhythm; No murmurs, rubs, or gallops  ABDOMEN: Soft, Nontender, Nondistended; Bowel sounds present  EXTREMITIES:  2+ Peripheral Pulses, No clubbing, cyanosis, or edema  PSYCH: AAOx2-3 but a little anxious  NEUROLOGY: non-focal  SKIN: No rashes or lesions    LABS:                        11.8   9.80  )-----------( 186      ( 05 Sep 2020 07:00 )             37.3     WBC Trend: 9.80<--, 10.33<--, 11.86<--  09-05    139  |  104  |  26<H>  ----------------------------<  116<H>  4.1   |  24  |  1.36<H>    Ca    9.0      05 Sep 2020 07:00  Phos  2.1     09-05  Mg     1.7     09-05      Creatinine Trend: 1.36<--, 1.19<--, 1.27<--, 1.27<--, 1.41<--, 1.62<--
Patient is a 86y old  Male who presents with a chief complaint of esophageal mass (05 Sep 2020 12:20)    SUBJECTIVE / OVERNIGHT EVENTS: No acute events overnight. Patient tolerating full liquid diet. Had one soft bm in the am.    MEDICATIONS  (STANDING):  amLODIPine   Tablet 5 milliGRAM(s) Oral daily  aspirin 325 milliGRAM(s) Oral daily  atorvastatin 10 milliGRAM(s) Oral at bedtime  doxazosin 8 milliGRAM(s) Oral at bedtime  pantoprazole    Tablet 40 milliGRAM(s) Oral before breakfast  polyethylene glycol 3350 17 Gram(s) Oral daily  propranolol 60 milliGRAM(s) Oral two times a day  senna 2 Tablet(s) Oral at bedtime  sodium chloride 0.9%. 1000 milliLiter(s) (75 mL/Hr) IV Continuous <Continuous>    MEDICATIONS  (PRN):  haloperidol     Tablet 0.5 milliGRAM(s) Oral every 6 hours PRN agitation  haloperidol    Injectable 0.5 milliGRAM(s) IntraMuscular every 6 hours PRN agitation  haloperidol    Injectable 0.5 milliGRAM(s) IV Push every 6 hours PRN agitation  haloperidol    Injectable 0.5 milliGRAM(s) IV Push once PRN agitation      CAPILLARY BLOOD GLUCOSE        I&O's Summary      T(C): 37.1 (09-06-20 @ 06:31), Max: 37.1 (09-06-20 @ 06:31)  HR: 73 (09-06-20 @ 06:31) (73 - 83)  BP: 119/51 (09-06-20 @ 06:31) (119/51 - 142/74)  RR: 17 (09-06-20 @ 06:31) (17 - 18)  SpO2: 97% (09-06-20 @ 06:31) (94% - 97%)  PHYSICAL EXAM:  GENERAL: NAD, well-developed  HEAD:  Atraumatic, Normocephalic  EYES: EOMI, PERRLA, conjunctiva and sclera clear  NECK: Supple, No JVD  CHEST/LUNG: Clear to auscultation bilaterally; No wheeze  HEART: Regular rate and rhythm; No murmurs, rubs, or gallops  ABDOMEN: Soft, Nontender, Nondistended; Bowel sounds present  EXTREMITIES:  2+ Peripheral Pulses, No clubbing, cyanosis, or edema  PSYCH: AAOx2  NEUROLOGY: non-focal  SKIN: No rashes or lesions    LABS:                        12.1   9.12  )-----------( 221      ( 06 Sep 2020 07:34 )             38.5     WBC Trend: 9.12<--, 9.80<--, 10.33<--  09-06    140  |  103  |  19  ----------------------------<  111<H>  3.9   |  22  |  1.25    Ca    8.9      06 Sep 2020 07:34  Phos  3.3     09-06  Mg     1.6     09-06      Creatinine Trend: 1.25<--, 1.36<--, 1.19<--, 1.27<--, 1.27<--, 1.41<--    MRI reviewed.
Patient is a 86y old  Male who presents with a chief complaint of esophageal mass (06 Sep 2020 12:22)      SUBJECTIVE / OVERNIGHT EVENTS: Pt has not complete. Eager to leave hospital.    MEDICATIONS  (STANDING):  amLODIPine   Tablet 5 milliGRAM(s) Oral daily  aspirin 325 milliGRAM(s) Oral daily  atorvastatin 10 milliGRAM(s) Oral at bedtime  doxazosin 8 milliGRAM(s) Oral at bedtime  pantoprazole    Tablet 40 milliGRAM(s) Oral before breakfast  polyethylene glycol 3350 17 Gram(s) Oral daily  propranolol 60 milliGRAM(s) Oral two times a day  senna 2 Tablet(s) Oral at bedtime  sodium chloride 0.9%. 1000 milliLiter(s) (75 mL/Hr) IV Continuous <Continuous>    MEDICATIONS  (PRN):  haloperidol     Tablet 0.5 milliGRAM(s) Oral every 6 hours PRN agitation  haloperidol    Injectable 0.5 milliGRAM(s) IntraMuscular every 6 hours PRN agitation  haloperidol    Injectable 0.5 milliGRAM(s) IV Push every 6 hours PRN agitation  haloperidol    Injectable 0.5 milliGRAM(s) IV Push once PRN agitation      CAPILLARY BLOOD GLUCOSE        I&O's Summary      PHYSICAL EXAM:  Vital Signs Last 24 Hrs  T(C): 36.9 (07 Sep 2020 13:05), Max: 37.3 (06 Sep 2020 21:50)  T(F): 98.5 (07 Sep 2020 13:05), Max: 99.2 (06 Sep 2020 21:50)  HR: 78 (07 Sep 2020 13:05) (78 - 92)  BP: 120/67 (07 Sep 2020 13:05) (104/60 - 157/71)  BP(mean): --  RR: 16 (07 Sep 2020 13:05) (16 - 17)  SpO2: 95% (07 Sep 2020 13:05) (94% - 96%)  CONSTITUTIONAL: NAD, well-developed, well-groomed  EYES: PERRLA; conjunctiva and sclera clear  ENMT: Moist oral mucosa, no pharyngeal injection or exudates; normal dentition  NECK: Supple, no palpable masses; no thyromegaly  RESPIRATORY: Normal respiratory effort; lungs are clear to auscultation bilaterally  CARDIOVASCULAR: Regular rate and rhythm, normal S1 and S2, no murmur/rub/gallop; No lower extremity edema; Peripheral pulses are 2+ bilaterally  ABDOMEN: Nontender to palpation, normoactive bowel sounds, no rebound/guarding; No hepatosplenomegaly      LABS:                        11.7   8.66  )-----------( 222      ( 07 Sep 2020 06:20 )             37.0     09-07    139  |  101  |  18  ----------------------------<  100<H>  3.8   |  24  |  1.22    Ca    9.2      07 Sep 2020 06:20  Phos  3.1     09-07  Mg     1.6     09-07                  RADIOLOGY & ADDITIONAL TESTS:  Results Reviewed:   Imaging Personally Reviewed:  Electrocardiogram Personally Reviewed:    COORDINATION OF CARE:  Care Discussed with Consultants/Other Providers [Y/N]:  Prior or Outpatient Records Reviewed [Y/N]:
SUBJECTIVE AND OBJECTIVE:  INTERVAL HPI/OVERNIGHT EVENTS:  Patient was seen and examined at bedside. Patient state he doesnt have any pain, able to tolerate diet, no diarrhea. No bowel movement for 2 days. Per nursing, no acute events overnight.     DNR on chart:   Allergies    No Known Allergies    Intolerances    MEDICATIONS  (STANDING):  amLODIPine   Tablet 5 milliGRAM(s) Oral daily  aspirin 325 milliGRAM(s) Oral daily  atorvastatin 10 milliGRAM(s) Oral at bedtime  doxazosin 8 milliGRAM(s) Oral at bedtime  pantoprazole    Tablet 40 milliGRAM(s) Oral before breakfast  propranolol 60 milliGRAM(s) Oral two times a day    MEDICATIONS  (PRN):  haloperidol     Tablet 0.5 milliGRAM(s) Oral every 6 hours PRN agitation  haloperidol    Injectable 0.5 milliGRAM(s) IntraMuscular every 6 hours PRN agitation  haloperidol    Injectable 0.5 milliGRAM(s) IV Push every 6 hours PRN agitation  haloperidol    Injectable 0.5 milliGRAM(s) IV Push once PRN agitation        ITEMS UNCHECKED ARE NOT PRESENT    PRESENT SYMPTOMS: [ ]Unable to obtain due to poor mentation   Source if other than patient:  [ ]Family   [ ]Team     Pain (Impact on QOL):    Location:  Minimal acceptable level (0-10 scale):                   Aggravating factors:  Quality:  Radiation:  Severity (0-10 scale):    Timing:    Dyspnea:                           [ ]Mild [ ]Moderate [ ]Severe  Anxiety:                             [ ]Mild [ ]Moderate [ ]Severe  Fatigue:                             [ ]Mild [ ]Moderate [ ]Severe  Nausea:                             [ ]Mild [ ]Moderate [ ]Severe  Loss of appetite:              [ ]Mild [ ]Moderate [ ]Severe  Constipation:                    [ ]Mild [ ]Moderate [ ]Severe    PAIN AD Score:	  http://geriatrictoolkit.[place].[facility]/cog/painad.pdf (Ctrl + left click to view)    Other Symptoms:  [ ]All other review of systems negative     Karnofsky Performance Score/Palliative Performance Status Version 2:      70   %    http://palliative.info/resource_material/PPSv2.pdf    PHYSICAL EXAM:  Vital Signs Last 24 Hrs  T(C): 36.7 (04 Sep 2020 05:04), Max: 36.8 (03 Sep 2020 21:35)  T(F): 98.1 (04 Sep 2020 05:04), Max: 98.3 (03 Sep 2020 21:35)  HR: 86 (04 Sep 2020 05:04) (81 - 92)  BP: 122/67 (04 Sep 2020 05:04) (121/71 - 153/79)  BP(mean): --  RR: 18 (04 Sep 2020 05:04) (18 - 20)  SpO2: 96% (04 Sep 2020 05:04) (95% - 99%)         GENERAL:  [x ]Alert  [x ]Oriented x 3  [ ]Lethargic  [ ]Cachexia  [ ]Unarousable  [x ]Verbal  [ ]Non-Verbal  Behavioral:   [ ] Anxiety  [ ] Delirium [ ] Agitation [ ] Other  HEENT:  [x ]Normal   [ ]Dry mouth   [ ]ET Tube/Trach  [ ]Oral lesions  PULMONARY:   [x ]Clear [ ]Tachypnea  [ ]Audible excessive secretions   [ ]Rhonchi        [ ]Right [ ]Left [ ]Bilateral  [ ]Crackles        [ ]Right [ ]Left [ ]Bilateral  [ ]Wheezing     [ ]Right [ ]Left [ ]Bilateral  CARDIOVASCULAR:    [x ]Regular [ ]Irregular [ ]Tachy  [ ]Carlos [ ]Murmur [ ]Other  GASTROINTESTINAL:  [x]Soft  [x ]Distended   [x ]+BS  [ ]Non tender [ ]Tender  [ ]PEG [ ]OGT/ NGT  Last BM:   GENITOURINARY:  [ ]Normal [ ] Incontinent   [ ]Oliguria/Anuria   [ ]Lyon  MUSCULOSKELETAL:   [x ]Normal   [ ]Weakness  [ ]Bed/Wheelchair bound [ ]Edema  NEUROLOGIC:   [x ]No focal deficits  [ ] Cognitive impairment  [ ] Dysphagia [ ]Dysarthria [ ] Paresis [ ]Other   SKIN:   [x ]Normal   [ ]Pressure ulcer(s)  [ ]Rash    CRITICAL CARE:  [ ] Shock Present  [ ]Septic [ ]Cardiogenic [ ]Neurologic [ ]Hypovolemic  [ ]  Vasopressors [ ]  Inotropes   [ ] Respiratory failure present  [ ] Acute  [ ] Chronic [ ] Hypoxic  [ ] Hypercarbic [ ] Other  [ ] Other organ failure     GRIEF   [ ] Yes  [x ] No    LABS:                                   12.0   10.33 )-----------( 190      ( 04 Sep 2020 06:00 )             37.7     09-04    141  |  103  |  22  ----------------------------<  95  3.8   |  21<L>  |  1.19    Ca    9.1      04 Sep 2020 06:00  Phos  2.5     09-04  Mg     1.7     09-04                RADIOLOGY & ADDITIONAL STUDIES:    Protein Calorie Malnutrition Present: [ ] yes [ ] no  [ ] PPSV2 < or = 30%  [ ] significant weight loss [ ] poor nutritional intake [ ] anasarca [ ] catabolic state Albumin, Serum: 3.2 g/dL (08-31-20 @ 06:22)  Artificial Nutrition [ ]     REFERRALS:   [ ]Chaplaincy  [ ] Hospice  [ ]Child Life  [ ]Social Work  [ ]Case management [ ]Holistic Therapy   Goals of Care Document: PO Osborne (09-02-20 @ 14:54)  Goals of Care Conversation:   Participants:  · Participants  Family  · Spouse  Elena - Wife  · Relative  Grand-Child - Wisam    Advance Directives:  · Does patient have Advance Directive  No  · Do you want to complete the HCP and name a [place] Care Agent  No  · Does Patient Have a Surrogate  Yes  · Surrogate's Name  Elena - Wife  · Surrogate's Phone Number  [phone]  · Does the Patient have a Court Appointed Guardian ([id number])  No  · Caregiver:  no    Conversation Discussion:  · Conversation  Diagnosis; MOLST Discussed; Treatment Options  · Conversation Details  Called Wife at [phone] around 1PM. Wife, Elena, defers to grandchild for making decisions. Per Wisam, patient's wife has some dementia and relies on her for medical decisions. Discussed hospital course and discussion about GOC we had with patient.    Patient stated that he wishes to go home if it's cancer, however wishes to be full code. After discussion with Grand-Daughter, she wishes we continue current therapy, grandfather should be full code, and further management after staging and biopsy pathology. If surgical intervention only would be available, would consider it.    Personal Advance Directives Treatment Guidelines:   Treatment Guidelines:  · Decision Maker  Surrogate  · Treatment Guidelines  Full Code  · Treatment Guideline Comments  Wishes everything to be done for now. Further discussions post biopsy results, staging based off CT findings, and treatment options.    Location of Discussion:   Duration of Advanced Care Planning Meeting:  · Time spent (in minutes)  15    Location of Discussion:  · Location of discussion  Telephone      Electronic Signatures:  Sanchez Gregory)   (Signed 02-Sep-2020 15:02)  	Authored: Goals of Care Conversation, Personal Advance Directives Treatment Guidelines, Location of Discussion  Catherine Ely ()   (Signed 02-Sep-2020 15:10)  	Co-Signer: Goals of Care Conversation, Personal Advance Directives Treatment Guidelines, Location of Discussion    Last Updated: 02-Sep-2020 15:10 by Catherine Ely ()
Team D Surgery Progress Note     SUBJECTIVE / 24H EVENTS  Patient seen and examined on morning rounds. No acute events overnight. He denies fever, chills nausea, vomiting     OBJECTIVE:    VITAL SIGNS:  T(C): 36.6 (09-05-20 @ 06:00), Max: 36.7 (09-04-20 @ 14:15)  HR: 84 (09-05-20 @ 06:00) (81 - 90)  BP: 134/72 (09-05-20 @ 06:00) (116/56 - 134/72)  RR: 16 (09-05-20 @ 06:00) (16 - 18)  SpO2: 95% (09-05-20 @ 06:00) (95% - 98%)      PHYSICAL EXAM:  Gen: NAD  LS: Respirations unlabored.   Card: RRR.  GI: Soft. Nontender. Nondistended.   Ext: Warm, well perfused, no edema        LAB VALUES:  09-05    139  |  104  |  26<H>  ----------------------------<  116<H>  4.1   |  24  |  1.36<H>    Ca    9.0      05 Sep 2020 07:00  Phos  2.1     09-05  Mg     1.7     09-05                                 11.8   9.80  )-----------( 186      ( 05 Sep 2020 07:00 )             37.3           MICROBIOLOGY:    No new microbiology data for review.     RADIOLOGY:    No new radiographic images for review.    MEDICATIONS  (STANDING):  amLODIPine   Tablet 5 milliGRAM(s) Oral daily  aspirin 325 milliGRAM(s) Oral daily  atorvastatin 10 milliGRAM(s) Oral at bedtime  doxazosin 8 milliGRAM(s) Oral at bedtime  pantoprazole    Tablet 40 milliGRAM(s) Oral before breakfast  propranolol 60 milliGRAM(s) Oral two times a day  senna 2 Tablet(s) Oral at bedtime    MEDICATIONS  (PRN):  haloperidol     Tablet 0.5 milliGRAM(s) Oral every 6 hours PRN agitation  haloperidol    Injectable 0.5 milliGRAM(s) IntraMuscular every 6 hours PRN agitation  haloperidol    Injectable 0.5 milliGRAM(s) IV Push every 6 hours PRN agitation  haloperidol    Injectable 0.5 milliGRAM(s) IV Push once PRN agitation
Team D Surgery Progress Note     SUBJECTIVE / 24H EVENTS  Patient seen and examined on morning rounds. No acute events overnight. He is tolerating full liquid diet but not taking in much PO. 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 There was concern for right common iliac vein thrombosis on MRI but duplex US demonstrated no thrombus.      OBJECTIVE:    VITAL SIGNS:  T(C): 36.7 (09-08-20 @ 06:16), Max: 36.9 (09-07-20 @ 13:05)  HR: 80 (09-08-20 @ 06:16) (78 - 90)  BP: 134/79 (09-08-20 @ 06:16) (97/57 - 142/65)  RR: 16 (09-08-20 @ 06:16) (16 - 16)  SpO2: 95% (09-08-20 @ 06:16) (95% - 95%)      PHYSICAL EXAM:  Gen: NAD  LS: Respirations unlabored.   Card: RRR.  GI: Soft. Nontender. Nondistended.   Ext: Warm, well perfused, no edema        LAB VALUES:  09-08    142  |  104  |  x   ----------------------------<  x   3.9   |  x   |  x     Ca    9.2      07 Sep 2020 06:20  Phos  3.1     09-07  Mg     1.6     09-07                                 11.6   8.86  )-----------( 232      ( 08 Sep 2020 06:15 )             38.4                   MICROBIOLOGY:    No new microbiology data for review.     RADIOLOGY:    < from: MR Abdomen w/ IV Cont (09.05.20 @ 19:41) >  FINDINGS:  LOWER CHEST: Within normal limits.    LIVER: Imaging markedly limited by respiratory motion. Probable metastases segment 6 (25:120 and 27:16)  BILE DUCTS: Normal caliber.  GALLBLADDER: Within normal limits.  SPLEEN: Within normal limits.  PANCREAS: Within normal limits.  ADRENALS: Within normal limits.  KIDNEYS/URETERS: Within normal limits.    VISUALIZED PORTIONS:  BOWEL: Esophageal thickening with partially visualized stent  PERITONEUM: No ascites.  VESSELS: Questionable right common iliac vein thrombus. Suggest correlation with ultrasound.  RETROPERITONEUM/LYMPH NODES: No lymphadenopathy.  ABDOMINAL WALL: Within normal limits.  BONES: Within normal limits.    IMPRESSION:  Very limited examination of the liver secondary to respiratory motion. Probable metastases segment 6 corresponding to findings on prior CT scan.  Distal esophageal thickening with partially visualized stent  Questionable right common iliac vein thrombus    < end of copied text >      < from: US Duplex Abdomen/Pelvis Limited (09.07.20 @ 18:17) >  FINDINGS:  The common iliac vein, internal iliac vein, external iliac vein, common femoral vein, and profunda are patent.  Normal compressibility of the common iliac vein and common femoral vein.    IMPRESSION:  No evidence of common iliac vein thrombosis.    < end of copied text >          MEDICATIONS  (STANDING):  amLODIPine   Tablet 5 milliGRAM(s) Oral daily  aspirin 325 milliGRAM(s) Oral daily  atorvastatin 10 milliGRAM(s) Oral at bedtime  doxazosin 8 milliGRAM(s) Oral at bedtime  pantoprazole    Tablet 40 milliGRAM(s) Oral before breakfast  polyethylene glycol 3350 17 Gram(s) Oral daily  propranolol 60 milliGRAM(s) Oral two times a day  senna 2 Tablet(s) Oral at bedtime  sodium chloride 0.9%. 1000 milliLiter(s) (75 mL/Hr) IV Continuous <Continuous>    MEDICATIONS  (PRN):  haloperidol     Tablet 0.5 milliGRAM(s) Oral every 6 hours PRN agitation  haloperidol    Injectable 0.5 milliGRAM(s) IntraMuscular every 6 hours PRN agitation  haloperidol    Injectable 0.5 milliGRAM(s) IV Push every 6 hours PRN agitation  haloperidol    Injectable 0.5 milliGRAM(s) IV Push once PRN agitation
Team D Surgery Progress Note     SUBJECTIVE / 24H EVENTS  Patient seen and examined on morning rounds. No acute events overnight. He is tolerating full liquids and palliative discussing treatment with family.     OBJECTIVE:    VITAL SIGNS:  T(C): 36.7 (09-04-20 @ 05:04), Max: 36.8 (09-03-20 @ 21:35)  HR: 86 (09-04-20 @ 05:04) (81 - 92)  BP: 122/67 (09-04-20 @ 05:04) (121/71 - 153/79)  RR: 18 (09-04-20 @ 05:04) (18 - 20)  SpO2: 96% (09-04-20 @ 05:04) (95% - 99%)      PHYSICAL EXAM:  Gen: NAD  LS: Respirations unlabored.   Card: RRR.  GI: Soft. Nontender. Nondistended.   Ext: Warm, well perfused, no edema        LAB VALUES:  09-04    141  |  103  |  22  ----------------------------<  95  3.8   |  21<L>  |  1.19    Ca    9.1      04 Sep 2020 06:00  Phos  2.5     09-04  Mg     1.7     09-04                                 12.0   10.33 )-----------( 190      ( 04 Sep 2020 06:00 )             37.7           MICROBIOLOGY:    No new microbiology data for review.     RADIOLOGY:    No new radiographic images for review.    MEDICATIONS  (STANDING):  amLODIPine   Tablet 5 milliGRAM(s) Oral daily  aspirin 325 milliGRAM(s) Oral daily  atorvastatin 10 milliGRAM(s) Oral at bedtime  doxazosin 8 milliGRAM(s) Oral at bedtime  pantoprazole    Tablet 40 milliGRAM(s) Oral before breakfast  propranolol 60 milliGRAM(s) Oral two times a day    MEDICATIONS  (PRN):  haloperidol     Tablet 0.5 milliGRAM(s) Oral every 6 hours PRN agitation  haloperidol    Injectable 0.5 milliGRAM(s) IntraMuscular every 6 hours PRN agitation  haloperidol    Injectable 0.5 milliGRAM(s) IV Push every 6 hours PRN agitation  haloperidol    Injectable 0.5 milliGRAM(s) IV Push once PRN agitation
Team D Surgery Progress Note     SUBJECTIVE / 24H EVENTS  Patient seen and examined on morning rounds. No acute events overnight. He was confused this morning and wondering where his wife is. He has otherwise been tolerating full liquids without nausea or vomiting. He denies fever, chills, SOB, CP    OBJECTIVE:    VITAL SIGNS:  T(C): 36.9 (09-03-20 @ 05:36), Max: 37.1 (09-02-20 @ 17:58)  HR: 79 (09-03-20 @ 05:36) (79 - 95)  BP: 140/69 (09-03-20 @ 05:36) (119/78 - 145/65)  RR: 19 (09-03-20 @ 05:36) (16 - 19)  SpO2: 99% (09-03-20 @ 05:36) (95% - 99%)      PHYSICAL EXAM:  Gen: NAD  LS: Respirations unlabored.   Card: RRR.   GI: Soft. Nontender. Nondistended.   Ext: Warm, well perfused, no edema        LAB VALUES:  09-03    142  |  106  |  17  ----------------------------<  94  4.0   |  21<L>  |  1.27    Ca    8.9      03 Sep 2020 06:00  Phos  2.7     09-03  Mg     1.7     09-03                                 12.6   11.86 )-----------( 192      ( 03 Sep 2020 06:00 )             40.5         MICROBIOLOGY:    No new microbiology data for review.     RADIOLOGY:    < from: CT Abdomen and Pelvis w/ IV Cont (09.02.20 @ 13:38) >  FINDINGS:  LOWER CHEST: New trace bilateral pleural effusions what adjacent passive atelectasis.    LIVER: About 6 indeterminate right lobe liver lesions measuring up to 1 cm in segment 6 (series 2 image 56).  BILE DUCTS: Normal caliber.  GALLBLADDER: Within normal limits.  SPLEEN: Within normal limits.  PANCREAS: Within normal limits.  ADRENALS: Nodular adrenal gland thickening bilaterally.  KIDNEYS/URETERS: A left parapelvic cyst and additional left renal subcentimeter hypodensities that are too small to characterize. Right upper pole cortical atrophy/scarring. No hydronephrosis    BLADDER: Underdistended, limiting evaluation.  REPRODUCTIVE ORGANS: Prostate brachytherapy seeds.    BOWEL: Redemonstrated masslike thickening of the distal esophagus, now status post placement of an esophageal stent traversing the GE junction. A 1.5 cm periampullary duodenal diverticulum. Extensive colonic diverticulosis without acute diverticulitis. No obstruction. Appendix is normal.  PERITONEUM: No ascites.  VESSELS: Atherosclerotic change.  RETROPERITONEUM/LYMPH NODES: No lymphadenopathy.  ABDOMINAL WALL: A tiny fat-containing umbilical hernia.  BONES: Degenerative changes with grade 1 anterolisthesis of L4 over L5. No suspicious osseous lesions.    IMPRESSION:  Redemonstrated masslike thickening of the distal esophagus, highly suspicious for neoplasm, now status post esophageal stent placement. Correlate with pending pathology.    About 6 indeterminate right lobe liver lesions measuring up to 1 cm in segment 6, raises concern for metastatic disease. Recommend further evaluation with dedicated contrast enhanced liver protocol MR.    No lymphadenopathy.    < end of copied text >      < from: CT Head No Cont (09.01.20 @ 20:31) >  IMPRESSION:    1. No acute intracranial hemorrhage.    2. 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    3. 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    4. Complete hyperdense opacification of the right maxillary sinus which may indicate chronic sinusitis. Superimposed mucocele formation and/or fungal colonization are also possible.    5. Recommend further evaluation with a contrast enhanced brain MRI examination, if there are no clinical contraindications.    < end of copied text >      MEDICATIONS  (STANDING):  amLODIPine   Tablet 5 milliGRAM(s) Oral daily  aspirin 325 milliGRAM(s) Oral daily  atorvastatin 10 milliGRAM(s) Oral at bedtime  doxazosin 8 milliGRAM(s) Oral at bedtime  pantoprazole    Tablet 40 milliGRAM(s) Oral before breakfast  propranolol 60 milliGRAM(s) Oral two times a day    MEDICATIONS  (PRN):  haloperidol     Tablet 0.5 milliGRAM(s) Oral every 6 hours PRN agitation  haloperidol    Injectable 0.5 milliGRAM(s) IntraMuscular every 6 hours PRN agitation  haloperidol    Injectable 0.5 milliGRAM(s) IV Push every 6 hours PRN agitation  haloperidol    Injectable 0.5 milliGRAM(s) IV Push once PRN agitation
Patient is a 86y old  Male who presents with a chief complaint of esophageal mass (04 Sep 2020 09:54)      SUBJECTIVE / OVERNIGHT EVENTS:  patient alert but felling anxious- wants to go home  Awaiting Mri- tasked- mri will do today      MEDICATIONS  (STANDING):  amLODIPine   Tablet 5 milliGRAM(s) Oral daily  aspirin 325 milliGRAM(s) Oral daily  atorvastatin 10 milliGRAM(s) Oral at bedtime  doxazosin 8 milliGRAM(s) Oral at bedtime  pantoprazole    Tablet 40 milliGRAM(s) Oral before breakfast  propranolol 60 milliGRAM(s) Oral two times a day    MEDICATIONS  (PRN):  haloperidol     Tablet 0.5 milliGRAM(s) Oral every 6 hours PRN agitation  haloperidol    Injectable 0.5 milliGRAM(s) IntraMuscular every 6 hours PRN agitation  haloperidol    Injectable 0.5 milliGRAM(s) IV Push every 6 hours PRN agitation  haloperidol    Injectable 0.5 milliGRAM(s) IV Push once PRN agitation      Vital Signs Last 24 Hrs  T(C): 36.7 (04 Sep 2020 05:04), Max: 36.8 (03 Sep 2020 21:35)  T(F): 98.1 (04 Sep 2020 05:04), Max: 98.3 (03 Sep 2020 21:35)  HR: 86 (04 Sep 2020 05:04) (81 - 92)  BP: 122/67 (04 Sep 2020 05:04) (121/71 - 153/79)  BP(mean): --  RR: 18 (04 Sep 2020 05:04) (18 - 20)  SpO2: 96% (04 Sep 2020 05:04) (95% - 99%)    PHYSICAL EXAM:  GENERAL: NAD, well-developed  HEAD:  Atraumatic, Normocephalic  EYES: EOMI, PERRLA, conjunctiva and sclera clear  NECK: Supple, No JVD  CHEST/LUNG: Clear to auscultation bilaterally; No wheeze  HEART: Regular rate and rhythm; No murmurs, rubs, or gallops  ABDOMEN: Soft, Nontender, Nondistended; Bowel sounds present  EXTREMITIES:  2+ Peripheral Pulses, No clubbing, cyanosis, or edema  PSYCH: AAOx3 but a little anxious  NEUROLOGY: non-focal  SKIN: No rashes or lesions    LABS:                        12.0   10.33 )-----------( 190      ( 04 Sep 2020 06:00 )             37.7     09-04    141  |  103  |  22  ----------------------------<  95  3.8   |  21<L>  |  1.19    Ca    9.1      04 Sep 2020 06:00  Phos  2.5     09-04  Mg     1.7     09-04        RADIOLOGY & ADDITIONAL TESTS:  < from: CT Abdomen and Pelvis w/ IV Cont (09.02.20 @ 13:38) >  IMPRESSION:  Redemonstrated masslike thickening of the distal esophagus, highly suspicious for neoplasm, now status post esophageal stent placement. Correlate with pending pathology.  About 6 indeterminate right lobe liver lesions measuring up to 1 cm in segment 6, raises concern for metastatic disease. Recommend further evaluation with dedicated contrast enhanced liver protocol MR.  No lymphadenopathy.        Imaging Personally Reviewed:    Consultant(s) Notes Reviewed:      Care Discussed with Consultants/Other Providers:
Patient is a 86y old  Male who presents with a chief complaint of esophageal mass (03 Sep 2020 11:43)      SUBJECTIVE / OVERNIGHT EVENTS:  resting in bed  awaiting MRI    MEDICATIONS  (STANDING):  amLODIPine   Tablet 5 milliGRAM(s) Oral daily  aspirin 325 milliGRAM(s) Oral daily  atorvastatin 10 milliGRAM(s) Oral at bedtime  doxazosin 8 milliGRAM(s) Oral at bedtime  pantoprazole    Tablet 40 milliGRAM(s) Oral before breakfast  propranolol 60 milliGRAM(s) Oral two times a day    MEDICATIONS  (PRN):  haloperidol     Tablet 0.5 milliGRAM(s) Oral every 6 hours PRN agitation  haloperidol    Injectable 0.5 milliGRAM(s) IntraMuscular every 6 hours PRN agitation  haloperidol    Injectable 0.5 milliGRAM(s) IV Push every 6 hours PRN agitation  haloperidol    Injectable 0.5 milliGRAM(s) IV Push once PRN agitation      Vital Signs Last 24 Hrs  T(C): 36.9 (03 Sep 2020 05:36), Max: 37.1 (02 Sep 2020 17:58)  T(F): 98.5 (03 Sep 2020 05:36), Max: 98.7 (02 Sep 2020 17:58)  HR: 79 (03 Sep 2020 05:36) (79 - 95)  BP: 140/69 (03 Sep 2020 05:36) (119/78 - 145/65)  BP(mean): --  RR: 19 (03 Sep 2020 05:36) (16 - 19)  SpO2: 99% (03 Sep 2020 05:36) (95% - 99%)    PHYSICAL EXAM:  GENERAL: NAD, well-developed  HEAD:  Atraumatic, Normocephalic  EYES: EOMI, PERRLA, conjunctiva and sclera clear  NECK: Supple, No JVD  CHEST/LUNG: Clear to auscultation bilaterally; No wheeze  HEART: Regular rate and rhythm; No murmurs, rubs, or gallops  ABDOMEN: Soft, Nontender, Nondistended; Bowel sounds present  EXTREMITIES:  2+ Peripheral Pulses, No clubbing, cyanosis, or edema  PSYCH: Alert  NEUROLOGY: non-focal  SKIN: No rashes or lesions    LABS:                        12.6   11.86 )-----------( 192      ( 03 Sep 2020 06:00 )             40.5     09-03    142  |  106  |  17  ----------------------------<  94  4.0   |  21<L>  |  1.27    Ca    8.9      03 Sep 2020 06:00  Phos  2.7     09-03  Mg     1.7     09-03        RADIOLOGY & ADDITIONAL TESTS:  < from: CT Abdomen and Pelvis w/ IV Cont (09.02.20 @ 13:38) >  IMPRESSION:  Redemonstrated masslike thickening of the distal esophagus, highly suspicious for neoplasm, now status post esophageal stent placement. Correlate with pending pathology.    About 6 indeterminate right lobe liver lesions measuring up to 1 cm in segment 6, raises concern for metastatic disease. Recommend further evaluation with dedicated contrast enhanced liver protocol MR.    No lymphadenopathy.      < end of copied text >      Care Discussed with Consultants/Other Providers:
Patient is a 86y old  Male who presents with a chief complaint of esophageal mass (01 Sep 2020 09:12)      SUBJECTIVE / OVERNIGHT EVENTS:  Going down for IR procedure  CA noted he sundown when he got to hospital     MEDICATIONS  (STANDING):  sodium chloride 0.9%. 1000 milliLiter(s) (75 mL/Hr) IV Continuous <Continuous>    MEDICATIONS  (PRN):    Vital Signs Last 24 Hrs  T(C): 35.9 (01 Sep 2020 11:00), Max: 36.8 (31 Aug 2020 16:10)  T(F): 96.6 (01 Sep 2020 11:00), Max: 98.3 (31 Aug 2020 16:10)  HR: 100 (01 Sep 2020 11:00) (82 - 106)  BP: 143/81 (01 Sep 2020 11:00) (108/56 - 182/91)  BP(mean): --  RR: 22 (01 Sep 2020 11:00) (16 - 22)  SpO2: 95% (01 Sep 2020 11:00) (95% - 99%)    PHYSICAL EXAM:  GENERAL: NAD, well-developed  HEAD:  Atraumatic, Normocephalic  EYES: EOMI, PERRLA, conjunctiva and sclera clear  NECK: Supple, No JVD  CHEST/LUNG: Clear to auscultation bilaterally; No wheeze  HEART: Regular rate and rhythm; No murmurs, rubs, or gallops  ABDOMEN: Soft, Nontender, Nondistended; Bowel sounds present  EXTREMITIES:  2+ Peripheral Pulses, No clubbing, cyanosis, or edema      LABS:                        12.5   7.10  )-----------( 224      ( 31 Aug 2020 06:22 )             40.1     08-31    141  |  105  |  13  ----------------------------<  116<H>  3.5   |  26  |  1.41<H>    Ca    8.8      31 Aug 2020 06:22    TPro  6.8  /  Alb  3.2<L>  /  TBili  0.6  /  DBili  x   /  AST  13  /  ALT  12  /  AlkPhos  59  08-31        RADIOLOGY & ADDITIONAL TESTS:  < from: CT Chest No Cont (08.30.20 @ 08:14) >  IMPRESSION:  Marked thickening of the mid to distal esophagus with associated luminal narrowing and moderate amount of debris seen above the area of thickening, findings 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 Recommend upper endoscopy for further evaluation.        Care Discussed with Consultants/Other Providers:
Patient is a 86y old  Male who presents with a chief complaint of esophageal mass (08 Sep 2020 09:21)      SUBJECTIVE / OVERNIGHT EVENTS:  Resting in bed.  Ready to go home    MEDICATIONS  (STANDING):  amLODIPine   Tablet 5 milliGRAM(s) Oral daily  aspirin 325 milliGRAM(s) Oral daily  atorvastatin 10 milliGRAM(s) Oral at bedtime  doxazosin 8 milliGRAM(s) Oral at bedtime  pantoprazole    Tablet 40 milliGRAM(s) Oral before breakfast  polyethylene glycol 3350 17 Gram(s) Oral daily  propranolol 60 milliGRAM(s) Oral two times a day  senna 2 Tablet(s) Oral at bedtime  sodium chloride 0.9%. 1000 milliLiter(s) (75 mL/Hr) IV Continuous <Continuous>    MEDICATIONS  (PRN):  haloperidol     Tablet 0.5 milliGRAM(s) Oral every 6 hours PRN agitation  haloperidol    Injectable 0.5 milliGRAM(s) IntraMuscular every 6 hours PRN agitation  haloperidol    Injectable 0.5 milliGRAM(s) IV Push every 6 hours PRN agitation  haloperidol    Injectable 0.5 milliGRAM(s) IV Push once PRN agitation      Vital Signs Last 24 Hrs  T(C): 36.7 (08 Sep 2020 06:16), Max: 36.9 (07 Sep 2020 13:05)  T(F): 98 (08 Sep 2020 06:16), Max: 98.5 (07 Sep 2020 13:05)  HR: 80 (08 Sep 2020 09:49) (78 - 90)  BP: 109/57 (08 Sep 2020 09:49) (97/57 - 142/65)  BP(mean): --  RR: 16 (08 Sep 2020 06:16) (16 - 16)  SpO2: 95% (08 Sep 2020 06:16) (95% - 95%)    PHYSICAL EXAM:  GENERAL: NAD, well-developed  HEAD:  Atraumatic, Normocephalic  EYES: EOMI, PERRLA, conjunctiva and sclera clear  NECK: Supple, No JVD  CHEST/LUNG: Clear to auscultation bilaterally; No wheeze  HEART: Regular rate and rhythm; No murmurs, rubs, or gallops  ABDOMEN: Soft, Nontender, Nondistended; Bowel sounds present  EXTREMITIES:  2+ Peripheral Pulses, No clubbing, cyanosis, or edema  PSYCH: Alert  NEUROLOGY: non-focal  SKIN: No rashes or lesions    LABS:                        11.6   8.86  )-----------( 232      ( 08 Sep 2020 06:15 )             38.4     09-08    142  |  104  |  18  ----------------------------<  100<H>  3.9   |  23  |  1.24    Ca    8.9      08 Sep 2020 06:15  Phos  3.1     09-08  Mg     1.6     09-08          Care Discussed with Consultants/Other Providers:  Pain control      esophageal mass- bxp 9/1- Adenocarcinoma   Await Mri then home to f/u with Oncology   < from: MR Abdomen w/ IV Cont (09.05.20 @ 19:41) >  IMPRESSION:  Very limited examination of the liver secondary to respiratory motion. Probable metastases segment 6 corresponding to findings on prior CT scan.  Distal esophageal thickening with partially visualized stent  Questionable right common iliac vein thrombus    < from: US Duplex Abdomen/Pelvis Limited (09.07.20 @ 18:17) >  IMPRESSION:  No evidence of common iliac vein thrombosis.

## 2020-09-08 NOTE — PROGRESS NOTE ADULT - NUTRITIONAL ASSESSMENT
New Esophageal stents- Full liquid diet and ensure tid with meals- out patient f/u with Gi to advance diet- d/w GI

## 2020-09-08 NOTE — PROGRESS NOTE ADULT - PROBLEM SELECTOR PROBLEM 4
Palliative care encounter
Prophylactic measure
Palliative care encounter
Prophylactic measure

## 2020-09-08 NOTE — PROGRESS NOTE ADULT - PROBLEM SELECTOR PLAN 4
- GOC conversation with family yesterday, wishes patient to be Full code  - Wishes to go ahead with further evaluation and have treatment options  - Will make a decision about which option to take after workup  - Will continue to follow
hep sq
- GOC conversation with family 9/2/2020, wishes patient to be Full code  - Wishes to go ahead with further evaluation and have treatment options  - MRI pending, educated family about possibly doing it outpatient if it's not done today  - Will sign off as symptoms are well controlled, please reconsult if needed.
Start hep sq
hep sq
Start hep sq
Start hep sq

## 2020-09-08 NOTE — PROGRESS NOTE ADULT - PROBLEM SELECTOR PLAN 1
- esophageal stent done by Gi 9/1  D/w Gi - c/w  Full liquid diet  Out patient f/u with GI  - Possible mets to liver on MRI.  - Will need f/u with Oncology - esophageal stent done by Gi 9/1  D/w Gi - c/w  Full liquid diet with ensure  Out patient f/u with GI- They will advance his diet  - mets to liver on MRI.  Out patient f/u Oncology at [facility]  - Will need f/u with Oncology

## 2020-09-08 NOTE — PROGRESS NOTE ADULT - ATTENDING COMMENTS
Out patient f/u with Gi/ onc Out patient f/u with Gi/ onc/ surgery    Surgery  "No acute surgical intervention as patient will likely need neoadjuvant chemoradiotherapy prior to any resection and no emergent need for surgery (perforation, obstruction)  - Will sign off at this time, patient can follow up with Dr. Wong outpatient as needed if patient and patient's family decides to pursue treatment"    60 min to coordinate care. Out patient f/u with Gi/ onc/ surgery    Surgery  "No acute surgical intervention as patient will likely need neoadjuvant chemoradiotherapy prior to any resection and no emergent need for surgery (perforation, obstruction)  - Will sign off at this time, patient can follow up with Dr. Wong outpatient as needed if patient and patient's family decides to pursue treatment"    60 min to coordinate care.  Granddaughter Effie [phone]- called and left message with Effie.  Patient lives with wife and daughter.

## 2020-09-09 RX ORDER — IPRATROPIUM BROMIDE AND ALBUTEROL SULFATE 2.5; .5 MG/3ML; MG/3ML
0.5-2.5 (3) SOLUTION RESPIRATORY (INHALATION) 4 TIMES DAILY
Qty: 1 | Refills: 0 | Status: COMPLETED | COMMUNITY
Start: 2018-02-15 | End: 2020-09-09

## 2020-09-09 RX ORDER — POTASSIUM CHLORIDE 750 MG/1
10 TABLET, FILM COATED, EXTENDED RELEASE ORAL
Qty: 30 | Refills: 3 | Status: COMPLETED | COMMUNITY
Start: 2020-05-27 | End: 2020-09-09

## 2020-09-09 RX ORDER — NEBULIZER ACCESSORIES
KIT MISCELLANEOUS
Qty: 1 | Refills: 0 | Status: COMPLETED | COMMUNITY
Start: 2018-02-15 | End: 2020-09-09

## 2020-09-09 RX ORDER — FUROSEMIDE 20 MG/1
20 TABLET ORAL
Qty: 30 | Refills: 3 | Status: COMPLETED | COMMUNITY
Start: 2020-05-27 | End: 2020-09-09

## 2020-09-09 RX ORDER — INFLUENZA A VIRUS A/MICHIGAN/45/2015 X-275 (H1N1) ANTIGEN (FORMALDEHYDE INACTIVATED), INFLUENZA A VIRUS A/SINGAPORE/INFIMH-16-0019/2016 IVR-186 (H3N2) ANTIGEN (FORMALDEHYDE INACTIVATED), AND INFLUENZA B VIRUS B/MARYLAND/15/2016 BX-69A (A B/COLORADO/6/2017-LIKE VIRUS) ANTIGEN (FORMALDEHYDE INACTIVATED) 60; 60; 60 UG/.5ML; UG/.5ML; UG/.5ML
0.5 INJECTION, SUSPENSION INTRAMUSCULAR
Qty: 1 | Refills: 0 | Status: COMPLETED | COMMUNITY
Start: 2018-09-19 | End: 2020-09-09

## 2020-09-11 ENCOUNTER — RX RENEWAL (OUTPATIENT)
Age: 85
End: 2020-09-11

## 2020-09-14 ENCOUNTER — OUTPATIENT (OUTPATIENT)
Dept: OUTPATIENT SERVICES | Facility: HOSPITAL | Age: 85
LOS: 1 days | Discharge: HOME | End: 2020-09-14

## 2020-09-14 DIAGNOSIS — C15.9 MALIGNANT NEOPLASM OF ESOPHAGUS, UNSPECIFIED: ICD-10-CM

## 2020-09-16 ENCOUNTER — APPOINTMENT (OUTPATIENT)
Dept: HEMATOLOGY ONCOLOGY | Facility: CLINIC | Age: 85
End: 2020-09-16
Payer: MEDICARE

## 2020-09-16 VITALS
WEIGHT: 158.73 LBS | HEART RATE: 81 BPM | SYSTOLIC BLOOD PRESSURE: 115 MMHG | DIASTOLIC BLOOD PRESSURE: 71 MMHG | TEMPERATURE: 98 F | RESPIRATION RATE: 14 BRPM | BODY MASS INDEX: 23.51 KG/M2 | OXYGEN SATURATION: 95 % | HEIGHT: 68.9 IN

## 2020-09-16 DIAGNOSIS — Z80.0 FAMILY HISTORY OF MALIGNANT NEOPLASM OF DIGESTIVE ORGANS: ICD-10-CM

## 2020-09-16 DIAGNOSIS — Z87.891 PERSONAL HISTORY OF NICOTINE DEPENDENCE: ICD-10-CM

## 2020-09-16 PROCEDURE — 99215 OFFICE O/P EST HI 40 MIN: CPT

## 2020-09-21 ENCOUNTER — APPOINTMENT (OUTPATIENT)
Dept: FAMILY MEDICINE | Facility: CLINIC | Age: 85
End: 2020-09-21
Payer: MEDICARE

## 2020-09-21 DIAGNOSIS — Z23 ENCOUNTER FOR IMMUNIZATION: ICD-10-CM

## 2020-09-21 PROCEDURE — G0008: CPT

## 2020-09-21 PROCEDURE — 90686 IIV4 VACC NO PRSV 0.5 ML IM: CPT

## 2020-09-25 LAB
BASOPHILS # BLD AUTO: 0.1 K/UL
BASOPHILS NFR BLD AUTO: 1.2 %
EOSINOPHIL # BLD AUTO: 0.59 K/UL
EOSINOPHIL NFR BLD AUTO: 7 %
HCT VFR BLD CALC: 39.6 %
HGB BLD-MCNC: 11.9 G/DL
IMM GRANULOCYTES NFR BLD AUTO: 0.2 %
LYMPHOCYTES # BLD AUTO: 1.35 K/UL
LYMPHOCYTES NFR BLD AUTO: 16.1 %
MAN DIFF?: NORMAL
MCHC RBC-ENTMCNC: 25.5 PG
MCHC RBC-ENTMCNC: 30.1 GM/DL
MCV RBC AUTO: 84.8 FL
MONOCYTES # BLD AUTO: 0.69 K/UL
MONOCYTES NFR BLD AUTO: 8.2 %
NEUTROPHILS # BLD AUTO: 5.66 K/UL
NEUTROPHILS NFR BLD AUTO: 67.3 %
PLATELET # BLD AUTO: 285 K/UL
RBC # BLD: 4.67 M/UL
RBC # FLD: 16.3 %
SARS-COV-2 N GENE NPH QL NAA+PROBE: NOT DETECTED
WBC # FLD AUTO: 8.41 K/UL

## 2020-09-28 ENCOUNTER — APPOINTMENT (OUTPATIENT)
Dept: NUCLEAR MEDICINE | Facility: IMAGING CENTER | Age: 85
End: 2020-09-28
Payer: MEDICARE

## 2020-09-28 ENCOUNTER — OUTPATIENT (OUTPATIENT)
Dept: OUTPATIENT SERVICES | Facility: HOSPITAL | Age: 85
LOS: 1 days | End: 2020-09-28
Payer: COMMERCIAL

## 2020-09-28 DIAGNOSIS — C15.9 MALIGNANT NEOPLASM OF ESOPHAGUS, UNSPECIFIED: ICD-10-CM

## 2020-09-28 PROCEDURE — 78815 PET IMAGE W/CT SKULL-THIGH: CPT | Mod: 26,PI

## 2020-09-28 PROCEDURE — A9552: CPT

## 2020-09-28 PROCEDURE — 78815 PET IMAGE W/CT SKULL-THIGH: CPT

## 2020-09-30 ENCOUNTER — RX RENEWAL (OUTPATIENT)
Age: 85
End: 2020-09-30

## 2020-09-30 PROCEDURE — 88360 TUMOR IMMUNOHISTOCHEM/MANUAL: CPT | Mod: 26

## 2020-09-30 RX ORDER — LOVASTATIN 40 MG/1
40 TABLET ORAL
Qty: 90 | Refills: 3 | Status: ACTIVE | COMMUNITY
Start: 2019-09-16 | End: 1900-01-01

## 2020-10-01 ENCOUNTER — RESULT REVIEW (OUTPATIENT)
Age: 85
End: 2020-10-01

## 2020-10-01 ENCOUNTER — OUTPATIENT (OUTPATIENT)
Dept: OUTPATIENT SERVICES | Facility: HOSPITAL | Age: 85
LOS: 1 days | End: 2020-10-01
Payer: COMMERCIAL

## 2020-10-01 ENCOUNTER — APPOINTMENT (OUTPATIENT)
Dept: ULTRASOUND IMAGING | Facility: IMAGING CENTER | Age: 85
End: 2020-10-01
Payer: MEDICARE

## 2020-10-01 DIAGNOSIS — C15.9 MALIGNANT NEOPLASM OF ESOPHAGUS, UNSPECIFIED: ICD-10-CM

## 2020-10-01 PROCEDURE — 88360 TUMOR IMMUNOHISTOCHEM/MANUAL: CPT | Mod: 26

## 2020-10-01 PROCEDURE — 88341 IMHCHEM/IMCYTCHM EA ADD ANTB: CPT | Mod: 26,59

## 2020-10-01 PROCEDURE — 88342 IMHCHEM/IMCYTCHM 1ST ANTB: CPT

## 2020-10-01 PROCEDURE — 88341 IMHCHEM/IMCYTCHM EA ADD ANTB: CPT

## 2020-10-01 PROCEDURE — 76942 ECHO GUIDE FOR BIOPSY: CPT

## 2020-10-01 PROCEDURE — 88360 TUMOR IMMUNOHISTOCHEM/MANUAL: CPT

## 2020-10-01 PROCEDURE — 47000 NEEDLE BIOPSY OF LIVER PERQ: CPT

## 2020-10-01 PROCEDURE — 88342 IMHCHEM/IMCYTCHM 1ST ANTB: CPT | Mod: 26,59

## 2020-10-01 PROCEDURE — 76942 ECHO GUIDE FOR BIOPSY: CPT | Mod: 26

## 2020-10-04 PROBLEM — Z80.0 FAMILY HISTORY OF MALIGNANT NEOPLASM OF ESOPHAGUS: Status: ACTIVE | Noted: 2020-10-04

## 2020-10-04 NOTE — CONSULT LETTER
[Dear  ___] : Dear  [unfilled], [Courtesy Letter:] : I had the pleasure of seeing your patient, [unfilled], in my office today. [Please see my note below.] : Please see my note below. [Consult Closing:] : Thank you very much for allowing me to participate in the care of this patient.  If you have any questions, please do not hesitate to contact me. [Sincerely,] : Sincerely, [FreeTextEntry3] : Melva La D.O. \par Attending Physician \par [facility] Division of Medical Oncology and Hematology\par  \par [facility] \par Tel: [phone]\par Fax: [phone]\par

## 2020-10-04 NOTE — REASON FOR VISIT
[Initial Consultation] : an initial consultation [Family Member] : family member [FreeTextEntry2] : Esophageal cancer

## 2020-10-04 NOTE — REVIEW OF SYSTEMS
[Recent Change In Weight] : ~T recent weight change [Loss of Hearing] : loss of hearing [Fatigue] : fatigue [Constipation] : constipation [Hoarseness] : hoarseness [Negative] : Genitourinary [Anxiety] : anxiety [FreeTextEntry2] : wt loss 20 lbs [FreeTextEntry4] : throat  [FreeTextEntry7] : dysphagia; constipation better controlled [FreeTextEntry9] : leg pain  [de-identified] : tremor

## 2020-10-04 NOTE — HISTORY OF PRESENT ILLNESS
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 \par \par Ross presented to [facility] on on 8/30/20 with 2 week h/o dysphagia, and difficulty managing secretions. CT Chest on admission revealed thickening of the mid-distal esophagus with associated luminal narrowing and moderate amt of debris seen, findings concerned for at least a partial obstruction. Transferred to [facility] for further management.\par 9/1/20: EGD: large obstructing esophageal mass in the lower 1/3 of esophagus s/p biopsy and stent placement \par 9/2/20: CT A/P: 6 mm and 1 cm liver lesion concerning for met, no lymphadenopathy \par 9/5/20: MRI Abdomen: limited 2/2 motion \par 9/8/20: diet advanced to pureed and d/c home\par \par Referred to medical oncology for f/u.  [de-identified] : adenocarcinoma, moderately differentiated  [de-identified] : CA 19-9 29 [de-identified] : Her 2 neg [de-identified] : ROS: denies any pain \par increased to pureed diet yesterday, denies cough/SOB; takes 2 ensure per day\par + appetite is intact \par less constipation not needing laxative daily bm\par not sleeping well since recent hospitalization \par lives with his wife\par has a chronic mild tremor\par independent in ADLs, uses walker occasionally \par 1 son  from esophageal cancer at 44 \par not in touch with daughter healthy as he knows \par no nausea \par \par \par \par

## 2020-10-05 LAB — SURGICAL PATHOLOGY STUDY: SIGNIFICANT CHANGE UP

## 2020-10-07 ENCOUNTER — APPOINTMENT (OUTPATIENT)
Age: 85
End: 2020-10-07
Payer: MEDICARE

## 2020-10-07 VITALS
HEART RATE: 82 BPM | TEMPERATURE: 98.3 F | WEIGHT: 162.04 LBS | BODY MASS INDEX: 24 KG/M2 | RESPIRATION RATE: 14 BRPM | OXYGEN SATURATION: 98 % | SYSTOLIC BLOOD PRESSURE: 127 MMHG | DIASTOLIC BLOOD PRESSURE: 79 MMHG

## 2020-10-07 PROCEDURE — 99214 OFFICE O/P EST MOD 30 MIN: CPT

## 2020-10-12 NOTE — HISTORY OF PRESENT ILLNESS
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 \par \par Ross presented to [facility] on on 8/30/20 with 2 week h/o dysphagia, and difficulty managing secretions. CT Chest on admission revealed thickening of the mid-distal esophagus with associated luminal narrowing and moderate amt of debris seen, findings concerned for at least a partial obstruction. Transferred to [facility] for further management.\par 9/1/20: EGD: large obstructing esophageal mass in the lower 1/3 of esophagus s/p biopsy and stent placement \par 9/2/20: CT A/P: 6 mm and 1 cm liver lesion concerning for met, no lymphadenopathy \par 9/5/20: MRI Abdomen: limited 2/2 motion \par 9/8/20: diet advanced to pureed and d/c home\par \par Referred to medical oncology for f/u. \par \par 9/28/20: PET avid liver lesions, thoracic right paratracheal LN, GEJ\par 10/1/20: liver bx: adenocarcinoma \par  [de-identified] : adenocarcinoma, moderately differentiated  [de-identified] : CA 19-9 29 [de-identified] : Her 2 neg\par CPS 1  [de-identified] : + tolerating soft diet\par + gained 3.5  lbs since last visit \par Getting around independently \par denies any pain \par

## 2020-10-12 NOTE — REASON FOR VISIT
[Follow-Up Visit] : a follow-up [Family Member] : family member [FreeTextEntry2] : Stage IV esophageal adenocarcinoma

## 2020-10-14 ENCOUNTER — OUTPATIENT (OUTPATIENT)
Dept: OUTPATIENT SERVICES | Facility: HOSPITAL | Age: 85
LOS: 1 days | Discharge: HOME | End: 2020-10-14

## 2020-10-14 DIAGNOSIS — C15.9 MALIGNANT NEOPLASM OF ESOPHAGUS, UNSPECIFIED: ICD-10-CM

## 2020-10-16 ENCOUNTER — APPOINTMENT (OUTPATIENT)
Age: 85
End: 2020-10-16

## 2020-10-22 ENCOUNTER — APPOINTMENT (OUTPATIENT)
Age: 85
End: 2020-10-22

## 2020-10-28 ENCOUNTER — NON-APPOINTMENT (OUTPATIENT)
Age: 85
End: 2020-10-28

## 2020-11-06 ENCOUNTER — RESULT REVIEW (OUTPATIENT)
Age: 85
End: 2020-11-06

## 2020-11-06 ENCOUNTER — LABORATORY RESULT (OUTPATIENT)
Age: 85
End: 2020-11-06

## 2020-11-06 ENCOUNTER — APPOINTMENT (OUTPATIENT)
Age: 85
End: 2020-11-06
Payer: MEDICARE

## 2020-11-06 ENCOUNTER — APPOINTMENT (OUTPATIENT)
Age: 85
End: 2020-11-06

## 2020-11-06 LAB
BASOPHILS # BLD AUTO: 0.1 K/UL — SIGNIFICANT CHANGE UP (ref 0–0.2)
BASOPHILS NFR BLD AUTO: 1 % — SIGNIFICANT CHANGE UP (ref 0–2)
EOSINOPHIL # BLD AUTO: 0.38 K/UL — SIGNIFICANT CHANGE UP (ref 0–0.5)
EOSINOPHIL NFR BLD AUTO: 3.8 % — SIGNIFICANT CHANGE UP (ref 0–6)
HCT VFR BLD CALC: 29.7 % — LOW (ref 39–50)
HGB BLD-MCNC: 9.4 G/DL — LOW (ref 13–17)
IMM GRANULOCYTES NFR BLD AUTO: 0.4 % — SIGNIFICANT CHANGE UP (ref 0–1.5)
LYMPHOCYTES # BLD AUTO: 1.34 K/UL — SIGNIFICANT CHANGE UP (ref 1–3.3)
LYMPHOCYTES # BLD AUTO: 13.4 % — SIGNIFICANT CHANGE UP (ref 13–44)
MCHC RBC-ENTMCNC: 27.5 PG — SIGNIFICANT CHANGE UP (ref 27–34)
MCHC RBC-ENTMCNC: 31.6 G/DL — LOW (ref 32–36)
MCV RBC AUTO: 86.8 FL — SIGNIFICANT CHANGE UP (ref 80–100)
MONOCYTES # BLD AUTO: 1.23 K/UL — HIGH (ref 0–0.9)
MONOCYTES NFR BLD AUTO: 12.3 % — SIGNIFICANT CHANGE UP (ref 2–14)
NEUTROPHILS # BLD AUTO: 6.91 K/UL — SIGNIFICANT CHANGE UP (ref 1.8–7.4)
NEUTROPHILS NFR BLD AUTO: 69.1 % — SIGNIFICANT CHANGE UP (ref 43–77)
NRBC # BLD: 0 /100 WBCS — SIGNIFICANT CHANGE UP (ref 0–0)
PLATELET # BLD AUTO: 185 K/UL — SIGNIFICANT CHANGE UP (ref 150–400)
RBC # BLD: 3.42 M/UL — LOW (ref 4.2–5.8)
RBC # FLD: 17.4 % — HIGH (ref 10.3–14.5)
WBC # BLD: 10 K/UL — SIGNIFICANT CHANGE UP (ref 3.8–10.5)
WBC # FLD AUTO: 10 K/UL — SIGNIFICANT CHANGE UP (ref 3.8–10.5)

## 2020-11-06 PROCEDURE — 99213 OFFICE O/P EST LOW 20 MIN: CPT

## 2020-11-06 PROCEDURE — 99072 ADDL SUPL MATRL&STAF TM PHE: CPT

## 2020-11-08 DIAGNOSIS — Z51.11 ENCOUNTER FOR ANTINEOPLASTIC CHEMOTHERAPY: ICD-10-CM

## 2020-11-09 ENCOUNTER — NON-APPOINTMENT (OUTPATIENT)
Age: 85
End: 2020-11-09

## 2020-11-10 ENCOUNTER — NON-APPOINTMENT (OUTPATIENT)
Age: 85
End: 2020-11-10

## 2020-11-20 ENCOUNTER — OUTPATIENT (OUTPATIENT)
Dept: OUTPATIENT SERVICES | Facility: HOSPITAL | Age: 85
LOS: 1 days | Discharge: HOME | End: 2020-11-20

## 2020-11-27 ENCOUNTER — APPOINTMENT (OUTPATIENT)
Age: 85
End: 2020-11-27

## 2020-11-27 ENCOUNTER — LABORATORY RESULT (OUTPATIENT)
Age: 85
End: 2020-11-27

## 2020-11-27 ENCOUNTER — RESULT REVIEW (OUTPATIENT)
Age: 85
End: 2020-11-27

## 2020-11-27 ENCOUNTER — APPOINTMENT (OUTPATIENT)
Dept: HEMATOLOGY ONCOLOGY | Facility: CLINIC | Age: 85
End: 2020-11-27
Payer: MEDICARE

## 2020-11-27 VITALS
DIASTOLIC BLOOD PRESSURE: 77 MMHG | HEART RATE: 82 BPM | RESPIRATION RATE: 18 BRPM | SYSTOLIC BLOOD PRESSURE: 147 MMHG | TEMPERATURE: 98.7 F

## 2020-11-27 DIAGNOSIS — I73.9 PERIPHERAL VASCULAR DISEASE, UNSPECIFIED: ICD-10-CM

## 2020-11-27 PROCEDURE — 99072 ADDL SUPL MATRL&STAF TM PHE: CPT

## 2020-11-27 PROCEDURE — 99213 OFFICE O/P EST LOW 20 MIN: CPT

## 2020-12-12 NOTE — HISTORY OF PRESENT ILLNESS
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 \par \par Ross presented to [facility] on on 8/30/20 with 2 week h/o dysphagia, and difficulty managing secretions. CT Chest on admission revealed thickening of the mid-distal esophagus with associated luminal narrowing and moderate amt of debris seen, findings concerned for at least a partial obstruction. Transferred to [facility] for further management.\par 9/1/20: EGD: large obstructing esophageal mass in the lower 1/3 of esophagus s/p biopsy and stent placement \par 9/2/20: CT A/P: 6 mm and 1 cm liver lesion concerning for met, no lymphadenopathy \par 9/5/20: MRI Abdomen: limited 2/2 motion \par 9/8/20: diet advanced to pureed and d/c home\par \par Referred to medical oncology for f/u. \par \par 9/28/20: PET avid liver lesions, thoracic right paratracheal LN, GEJ\par 10/1/20: liver bx: adenocarcinoma \par 11/6/20: C1 Keytruda  [de-identified] : adenocarcinoma, moderately differentiated  [de-identified] : CA 19-9 29 [de-identified] : Her 2 neg\par CPS 1  [de-identified] : Overall feels ok. Eating and drinking well. Denies any pain. Moving bowels.

## 2020-12-16 PROBLEM — Z87.09 HISTORY OF ACUTE BRONCHITIS: Status: RESOLVED | Noted: 2018-02-15 | Resolved: 2020-12-16

## 2020-12-17 LAB — SARS-COV-2 N GENE NPH QL NAA+PROBE: NOT DETECTED

## 2020-12-18 ENCOUNTER — LABORATORY RESULT (OUTPATIENT)
Age: 85
End: 2020-12-18

## 2020-12-18 ENCOUNTER — RESULT REVIEW (OUTPATIENT)
Age: 85
End: 2020-12-18

## 2020-12-18 ENCOUNTER — APPOINTMENT (OUTPATIENT)
Age: 85
End: 2020-12-18

## 2020-12-18 RX ORDER — TERAZOSIN HYDROCHLORIDE 10 MG/1
1 CAPSULE ORAL
Qty: 0 | Refills: 0 | DISCHARGE

## 2020-12-18 RX ORDER — LOVASTATIN 20 MG
1 TABLET ORAL
Qty: 0 | Refills: 0 | DISCHARGE

## 2020-12-18 RX ORDER — ALPRAZOLAM 0.25 MG
1 TABLET ORAL
Qty: 0 | Refills: 0 | DISCHARGE

## 2020-12-18 RX ORDER — OMEPRAZOLE 10 MG/1
1 CAPSULE, DELAYED RELEASE ORAL
Qty: 0 | Refills: 0 | DISCHARGE

## 2020-12-23 ENCOUNTER — OUTPATIENT (OUTPATIENT)
Dept: OUTPATIENT SERVICES | Facility: HOSPITAL | Age: 85
LOS: 1 days | Discharge: HOME | End: 2020-12-23

## 2020-12-23 DIAGNOSIS — C15.9 MALIGNANT NEOPLASM OF ESOPHAGUS, UNSPECIFIED: ICD-10-CM

## 2020-12-24 ENCOUNTER — APPOINTMENT (OUTPATIENT)
Age: 85
End: 2020-12-24
Payer: MEDICARE

## 2020-12-24 DIAGNOSIS — R63.0 ANOREXIA: ICD-10-CM

## 2020-12-24 DIAGNOSIS — N18.3 CHRONIC KIDNEY DISEASE, STAGE 3 (MODERATE): ICD-10-CM

## 2020-12-24 PROCEDURE — 99214 OFFICE O/P EST MOD 30 MIN: CPT | Mod: 95

## 2020-12-24 RX ORDER — DRONABINOL 2.5 MG/1
2.5 CAPSULE ORAL
Qty: 30 | Refills: 0 | Status: ACTIVE | COMMUNITY
Start: 2020-12-24 | End: 1900-01-01

## 2020-12-26 LAB
APPEARANCE: CLEAR
BASOPHILS # BLD AUTO: 0.15 K/UL
BASOPHILS NFR BLD AUTO: 1.6 %
BILIRUBIN URINE: NEGATIVE
BLOOD URINE: NORMAL
COLOR: NORMAL
EOSINOPHIL # BLD AUTO: 0.1 K/UL
EOSINOPHIL NFR BLD AUTO: 1 %
GLUCOSE QUALITATIVE U: NEGATIVE
HCT VFR BLD CALC: 31.5 %
HGB BLD-MCNC: 9.3 G/DL
IMM GRANULOCYTES NFR BLD AUTO: 0.2 %
KETONES URINE: NEGATIVE
LEUKOCYTE ESTERASE URINE: NEGATIVE
LYMPHOCYTES # BLD AUTO: 1.49 K/UL
LYMPHOCYTES NFR BLD AUTO: 15.5 %
MAN DIFF?: NORMAL
MCHC RBC-ENTMCNC: 24.3 PG
MCHC RBC-ENTMCNC: 29.5 GM/DL
MCV RBC AUTO: 82.5 FL
MONOCYTES # BLD AUTO: 1.16 K/UL
MONOCYTES NFR BLD AUTO: 12.1 %
NEUTROPHILS # BLD AUTO: 6.67 K/UL
NEUTROPHILS NFR BLD AUTO: 69.6 %
NITRITE URINE: NEGATIVE
PH URINE: 6
PLATELET # BLD AUTO: 321 K/UL
PROTEIN URINE: ABNORMAL
RBC # BLD: 3.82 M/UL
RBC # FLD: 16.7 %
SPECIFIC GRAVITY URINE: >=1.03
UROBILINOGEN URINE: NORMAL
WBC # FLD AUTO: 9.59 K/UL

## 2020-12-27 NOTE — HISTORY OF PRESENT ILLNESS
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 \par \par Ross presented to [facility] on on 8/30/20 with 2 week h/o dysphagia, and difficulty managing secretions. CT Chest on admission revealed thickening of the mid-distal esophagus with associated luminal narrowing and moderate amt of debris seen, findings concerned for at least a partial obstruction. Transferred to [facility] for further management.\par 9/1/20: EGD: large obstructing esophageal mass in the lower 1/3 of esophagus s/p biopsy and stent placement \par 9/2/20: CT A/P: 6 mm and 1 cm liver lesion concerning for met, no lymphadenopathy \par 9/5/20: MRI Abdomen: limited 2/2 motion \par 9/8/20: diet advanced to pureed and d/c home\par \par Referred to medical oncology for f/u. \par \par 9/28/20: PET avid liver lesions, thoracic right paratracheal LN, GEJ\par 10/1/20: liver bx: adenocarcinoma \par 11/6/20 C1 Keytruda\par 11/27/20 C2 Keytruda [de-identified] : adenocarcinoma, moderately differentiated  [de-identified] : CA 19-9 29 [de-identified] : Her 2 neg\par CPS 1 \par Tumor Mutational [place] - 11 Muts/Mb [de-identified] : Seen in the treatment room.  He did not notice any new symptoms since first cycle.\par Feeling well. Denies fatigue, diarrhea. \par Eating variety of food, three meals daily without constipation. \par Denies upper abdominal pain/reflux.

## 2020-12-28 LAB
ALBUMIN SERPL ELPH-MCNC: 3.9 G/DL
ANION GAP SERPL CALC-SCNC: 17 MMOL/L
BUN SERPL-MCNC: 45 MG/DL
CALCIUM SERPL-MCNC: 9 MG/DL
CHLORIDE SERPL-SCNC: 106 MMOL/L
CO2 SERPL-SCNC: 19 MMOL/L
CREAT SERPL-MCNC: 4.06 MG/DL
CREAT SPEC-SCNC: 113 MG/DL
CREAT/PROT UR: 0.7 RATIO
GLUCOSE SERPL-MCNC: 75 MG/DL
PHOSPHATE SERPL-MCNC: 4.5 MG/DL
POTASSIUM SERPL-SCNC: 4.5 MMOL/L
PROT UR-MCNC: 75 MG/DL
SODIUM SERPL-SCNC: 142 MMOL/L

## 2020-12-30 ENCOUNTER — APPOINTMENT (OUTPATIENT)
Dept: ULTRASOUND IMAGING | Facility: HOSPITAL | Age: 85
End: 2020-12-30
Payer: MEDICARE

## 2020-12-30 ENCOUNTER — RESULT REVIEW (OUTPATIENT)
Age: 85
End: 2020-12-30

## 2020-12-30 ENCOUNTER — OUTPATIENT (OUTPATIENT)
Dept: OUTPATIENT SERVICES | Facility: HOSPITAL | Age: 85
LOS: 1 days | End: 2020-12-30
Payer: COMMERCIAL

## 2020-12-30 DIAGNOSIS — Z00.8 ENCOUNTER FOR OTHER GENERAL EXAMINATION: ICD-10-CM

## 2020-12-30 DIAGNOSIS — C15.9 MALIGNANT NEOPLASM OF ESOPHAGUS, UNSPECIFIED: ICD-10-CM

## 2020-12-30 PROCEDURE — 76770 US EXAM ABDO BACK WALL COMP: CPT

## 2020-12-30 PROCEDURE — 76770 US EXAM ABDO BACK WALL COMP: CPT | Mod: 26

## 2021-01-02 ENCOUNTER — LABORATORY RESULT (OUTPATIENT)
Age: 86
End: 2021-01-02

## 2021-01-04 LAB
ALBUMIN SERPL ELPH-MCNC: 3.6 G/DL
ANION GAP SERPL CALC-SCNC: 16 MMOL/L
BUN SERPL-MCNC: 35 MG/DL
CALCIUM SERPL-MCNC: 9 MG/DL
CHLORIDE SERPL-SCNC: 106 MMOL/L
CO2 SERPL-SCNC: 19 MMOL/L
CREAT SERPL-MCNC: 3.72 MG/DL
CREAT SPEC-SCNC: 105 MG/DL
CREAT/PROT UR: 0.5 RATIO
GLUCOSE SERPL-MCNC: 70 MG/DL
PHOSPHATE SERPL-MCNC: 3.8 MG/DL
POTASSIUM SERPL-SCNC: 4.1 MMOL/L
PROT UR-MCNC: 55 MG/DL
SODIUM SERPL-SCNC: 141 MMOL/L

## 2021-01-05 PROBLEM — R63.0 POOR APPETITE: Status: ACTIVE | Noted: 2020-12-24

## 2021-01-05 PROBLEM — N18.3 CHRONIC KIDNEY DISEASE, STAGE 3: Status: ACTIVE | Noted: 2017-06-29

## 2021-01-05 NOTE — HISTORY OF PRESENT ILLNESS
[Disease: _____________________] : Disease: [unfilled] [AJCC Stage: ____] : AJCC Stage: [unfilled] [Home] : at home, [unfilled] , at the time of the visit. [Medical Office: (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 \par \par Rsos presented to [facility] on on 8/30/20 with 2 week h/o dysphagia, and difficulty managing secretions. CT Chest on admission revealed thickening of the mid-distal esophagus with associated luminal narrowing and moderate amt of debris seen, findings concerned for at least a partial obstruction. Transferred to [facility] for further management.\par 9/1/20: EGD: large obstructing esophageal mass in the lower 1/3 of esophagus s/p biopsy and stent placement \par 9/2/20: CT A/P: 6 mm and 1 cm liver lesion concerning for met, no lymphadenopathy \par 9/5/20: MRI Abdomen: limited 2/2 motion \par 9/8/20: diet advanced to pureed and d/c home\par \par Referred to medical oncology for f/u. \par \par 9/28/20: PET avid liver lesions, thoracic right paratracheal LN, GEJ\par 10/1/20: liver bx: adenocarcinoma \par 11/6/20: C1 Keytruda \par 11/27/20: C2\par 12/18/20: C3 [de-identified] : adenocarcinoma, moderately differentiated  [de-identified] : CA 19-9 29 [de-identified] : Her 2 neg\par CPS 1  [de-identified] : overall feels ok. + fatigue. Drinking 4 glasses of water per day. Appetite is poor. Denies any pain.

## 2021-01-05 NOTE — REASON FOR VISIT
[Follow-Up Visit] : a follow-up [Spouse] : spouse [Family Member] : family member [FreeTextEntry2] : Stage IV esophagus ca

## 2021-01-05 NOTE — PHYSICAL EXAM
[Restricted in physically strenuous activity but ambulatory and able to carry out work of a light or sedentary nature] : Status 1- Restricted in physically strenuous activity but ambulatory and able to carry out work of a light or sedentary nature, e.g., light house work, office work [Normal] : affect appropriate [de-identified] : normal respiratory pattern

## 2021-01-08 ENCOUNTER — APPOINTMENT (OUTPATIENT)
Age: 86
End: 2021-01-08

## 2021-01-08 ENCOUNTER — APPOINTMENT (OUTPATIENT)
Dept: HEMATOLOGY ONCOLOGY | Facility: CLINIC | Age: 86
End: 2021-01-08

## 2021-01-09 ENCOUNTER — LABORATORY RESULT (OUTPATIENT)
Age: 86
End: 2021-01-09

## 2021-01-10 ENCOUNTER — OUTPATIENT (OUTPATIENT)
Dept: OUTPATIENT SERVICES | Facility: HOSPITAL | Age: 86
LOS: 1 days | End: 2021-01-10
Payer: COMMERCIAL

## 2021-01-10 ENCOUNTER — APPOINTMENT (OUTPATIENT)
Dept: NUCLEAR MEDICINE | Facility: IMAGING CENTER | Age: 86
End: 2021-01-10
Payer: MEDICARE

## 2021-01-10 DIAGNOSIS — Z00.8 ENCOUNTER FOR OTHER GENERAL EXAMINATION: ICD-10-CM

## 2021-01-10 DIAGNOSIS — C15.9 MALIGNANT NEOPLASM OF ESOPHAGUS, UNSPECIFIED: ICD-10-CM

## 2021-01-10 PROCEDURE — 78815 PET IMAGE W/CT SKULL-THIGH: CPT

## 2021-01-10 PROCEDURE — 78815 PET IMAGE W/CT SKULL-THIGH: CPT | Mod: 26,PS

## 2021-01-10 PROCEDURE — A9552: CPT

## 2021-01-11 DIAGNOSIS — E87.2 ACIDOSIS: ICD-10-CM

## 2021-01-11 RX ORDER — SODIUM BICARBONATE 650 MG/1
650 TABLET ORAL 3 TIMES DAILY
Qty: 270 | Refills: 3 | Status: ACTIVE | COMMUNITY
Start: 2021-01-11 | End: 1900-01-01

## 2021-01-15 ENCOUNTER — APPOINTMENT (OUTPATIENT)
Age: 86
End: 2021-01-15

## 2021-01-22 ENCOUNTER — OUTPATIENT (OUTPATIENT)
Dept: OUTPATIENT SERVICES | Facility: HOSPITAL | Age: 86
LOS: 1 days | Discharge: HOME | End: 2021-01-22

## 2021-01-22 DIAGNOSIS — C15.9 MALIGNANT NEOPLASM OF ESOPHAGUS, UNSPECIFIED: ICD-10-CM

## 2021-01-25 ENCOUNTER — APPOINTMENT (OUTPATIENT)
Age: 86
End: 2021-01-25
Payer: MEDICARE

## 2021-01-25 DIAGNOSIS — N18.9 ACUTE KIDNEY FAILURE, UNSPECIFIED: ICD-10-CM

## 2021-01-25 DIAGNOSIS — N17.9 ACUTE KIDNEY FAILURE, UNSPECIFIED: ICD-10-CM

## 2021-01-25 DIAGNOSIS — C15.9 MALIGNANT NEOPLASM OF ESOPHAGUS, UNSPECIFIED: ICD-10-CM

## 2021-01-25 PROCEDURE — 99214 OFFICE O/P EST MOD 30 MIN: CPT | Mod: 95

## 2021-01-25 NOTE — REASON FOR VISIT
[Follow-Up Visit] : a follow-up [Spouse] : spouse [Family Member] : family member [FreeTextEntry2] : Stage IV GEJ

## 2021-01-25 NOTE — HISTORY OF PRESENT ILLNESS
[Home] : at home, [unfilled] , at the time of the visit. 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 \par \par Ross presented to [facility] on on 8/30/20 with 2 week h/o dysphagia, and difficulty managing secretions. CT Chest on admission revealed thickening of the mid-distal esophagus with associated luminal narrowing and moderate amt of debris seen, findings concerned for at least a partial obstruction. Transferred to [facility] for further management.\par 9/1/20: EGD: large obstructing esophageal mass in the lower 1/3 of esophagus s/p biopsy and stent placement \par 9/2/20: CT A/P: 6 mm and 1 cm liver lesion concerning for met, no lymphadenopathy \par 9/5/20: MRI Abdomen: limited 2/2 motion \par 9/8/20: diet advanced to pureed and d/c home\par \par Referred to medical oncology for f/u. \par \par 9/28/20: PET avid liver lesions, thoracic right paratracheal LN, GEJ\par 10/1/20: liver bx: adenocarcinoma \par 11/6/20: C1 Keytruda \par 11/27/20: C2\par 12/18/20: C3\par 1/5/21: PET/CT: POD in multiple areas  [de-identified] : adenocarcinoma, moderately differentiated  [de-identified] : CA 19-9 29 [de-identified] : Her 2 neg\par CPS 1  [de-identified] : Clinically stable. Appetite is fair. Swallowing is unchanged. Weight is stable. Denies any pain. Able to perform ADLs. WE reviewed PET/CT results and GOC.

## 2021-01-25 NOTE — PHYSICAL EXAM
[Restricted in physically strenuous activity but ambulatory and able to carry out work of a light or sedentary nature] : Status 1- Restricted in physically strenuous activity but ambulatory and able to carry out work of a light or sedentary nature, e.g., light house work, office work [Normal] : affect appropriate [de-identified] : normal respiratory pattern

## 2021-02-04 RX ORDER — ALPRAZOLAM 0.25 MG/1
0.25 TABLET ORAL
Qty: 120 | Refills: 0 | Status: COMPLETED | COMMUNITY
Start: 2020-09-21 | End: 2021-02-04

## 2021-03-22 ENCOUNTER — APPOINTMENT (OUTPATIENT)
Dept: FAMILY MEDICINE | Facility: CLINIC | Age: 86
End: 2021-03-22

## 2021-04-17 ENCOUNTER — EMERGENCY (EMERGENCY)
Facility: HOSPITAL | Age: 86
LOS: 1 days | End: 2021-04-17
Attending: INTERNAL MEDICINE | Admitting: INTERNAL MEDICINE
Payer: COMMERCIAL

## 2021-04-17 VITALS — HEIGHT: 72 IN

## 2021-04-17 LAB
FLU A RESULT: SIGNIFICANT CHANGE UP
FLU A RESULT: SIGNIFICANT CHANGE UP
FLUAV AG NPH QL: SIGNIFICANT CHANGE UP
FLUBV AG NPH QL: SIGNIFICANT CHANGE UP
RSV RESULT: SIGNIFICANT CHANGE UP
RSV RNA RESP QL NAA+PROBE: SIGNIFICANT CHANGE UP
SARS-COV-2 RNA SPEC QL NAA+PROBE: SIGNIFICANT CHANGE UP

## 2021-04-17 PROCEDURE — 87635 SARS-COV-2 COVID-19 AMP PRB: CPT

## 2021-04-17 PROCEDURE — 92950 HEART/LUNG RESUSCITATION CPR: CPT

## 2021-04-17 PROCEDURE — 99285 EMERGENCY DEPT VISIT HI MDM: CPT | Mod: 25

## 2021-04-17 PROCEDURE — 87631 RESP VIRUS 3-5 TARGETS: CPT

## 2021-04-17 PROCEDURE — U0003: CPT

## 2021-04-17 NOTE — ED PROVIDER NOTE - OTHER DETAILS FREE TEXT FOR MDM ADDL HISTORY OBTAINED FROM QUESTION
Spoke with Hospice RN Magdalena, she states pt entered hospice 2/2021 for the esophageal cancer, he was a full code

## 2021-04-17 NOTE — ED ADULT NURSE NOTE - OBJECTIVE STATEMENT
88 y/o M, brought in by EMS, enters ED unresponsive w/ CPR in progress. As per EMS, pt. was found unresponsive on floor at home - unwitnessed. As per EMS, they had been working on pt. for ~25 min. prior to arrival to ED. Pt. was found in asystole and remained in asystole. Pt. presents cool to touch, face pale and extremities blue in color. Pt. intubated in field at 1540, 7.5 at lip and 23 at lip. Pt. had two IVs placed by EMS - 18 g bilaterally. Pt. received 5 epis, 1 bicarb and 1 calcium prior to arrival into ED. Last epi was ~1610. Pt. switched to Elliot device. Epi given at 1616 as per MD Hernandez and 1618, pulse check. Pt. in asystole w/ no pulse. Pt. given another epi at 1620 as per MD Hernandez. 1622, pt. remains in asystole w/ no pulse. Time of death - 1622. Family in room at time of death. Supervisor Memo made aware. Organ donation called. Post mortuum care to be completed.

## 2021-04-17 NOTE — ED PROVIDER NOTE - CARE PLAN
Principal Discharge DX:	Cardiac arrest   Principal Discharge DX:	Cardiopulmonary arrest  Secondary Diagnosis:	Atherosclerotic cardiovascular disease

## 2021-04-17 NOTE — ED PROVIDER NOTE - OBJECTIVE STATEMENT
88 y/o M with PMH of esophageal cancer on Hospice, prostate cancer, HTN was BIB EMS from home for cardiac arrest. Per EMS pt was found unresponsive.  EMS intubated pt in the field with 7.5 ETT, he was given 5 epi, 1 bicarb and 1 calcium, they followed ACLS protocol for ~20 min PTA to the ER, pt remained in asystole.

## 2021-04-17 NOTE — ED PROVIDER NOTE - CLINICAL SUMMARY MEDICAL DECISION MAKING FREE TEXT BOX
88 y/o M with PMH of esophageal cancer on Hospice, prostate cancer, HTN was BIB EMS from home for cardiac arrest. Per EMS pt was found unresponsive.  EMS intubated pt in the field with 7.5 ETT, he was given 5 epi, 1 bicarb and 1 calcium, they followed ACLS protocol for ~20 min PTA to the ER, pt remained in asystole. PE as noted above. Pt given 2 epi in the ED, ACLS continued for another 10 mins in the ED, pt remained in asystole. Time of death 1622. Spoke with ME Zurdo, pts case was not accepted

## 2021-04-17 NOTE — ED PROVIDER NOTE - PHYSICAL EXAMINATION
General: unresponsive, intubated, CPR in progress  Head:  NC/AT,   Eyess: pupils non reactive, dilated and fixed  Neck:   supple  Lungs:   +BS with bagging  CVS:   +pulse with CPR  Abd:    non distended  Ext:    no sign of trauma  Neuro: unresponsive

## 2021-04-17 NOTE — ED PROVIDER NOTE - ATTENDING CONTRIBUTION TO CARE
86 y/o M with PMH of esophageal cancer on Hospice, prostate cancer, HTN was BIB EMS from home for cardiac arrest. Per EMS pt was found unresponsive.  EMS intubated pt in the field with 7.5 ETT, he was given 5 epi, 1 bicarb and 1 calcium, they followed ACLS protocol for ~20 min PTA to the ER, pt remained in asystole. PE as noted above. Pt given 2 epi in the ED, ACLS continued for another 10 mins in the ED, pt remained in asystole. Time of death 1622. Spoke with NELY Renner, pts case was not accepted  death certificate was signed by me family was bedside after expiration  Dr. Hernandez:  I have reviewed and discussed with the PA/ resident the case specifics, including the history, physical assessment, evaluation, conclusion, laboratory results, and medical plan. I agree with the contents, and conclusions. I have personally examined, and interviewed the patient.

## 2021-04-17 NOTE — ED PROVIDER NOTE - PMH
GERD with apnea    Hypertension    Malignant neoplasm of esophagus, unspecified location    Prostate CA

## 2021-04-19 ENCOUNTER — NON-APPOINTMENT (OUTPATIENT)
Age: 86
End: 2021-04-19

## 2022-05-10 NOTE — PHYSICAL EXAM
2/2 Leukocytosis possible sepsis from pneumonia  Came in with WBC of 14 now down to 11.8>10.9  Lactic acid 3.1>2.1>1.4  SO0012> [id number]  UA negative  Continue patient on cefepime day 5 will transition to Augmentin for 3 more days, azithromycin finished   X-ray shows mild left lung groundglass and/or interstitial opacification  CT head, cervical spine, facial bones, pelvis all negative  Blood cultures ngtd [Well Developed] : well developed [No Acute Distress] : no acute distress [Well Nourished] : well nourished [Normal Voice/Communication] : normal voice/communication [Well-Appearing] : well-appearing [PERRL] : pupils equal round and reactive to light [EOMI] : extraocular movements intact [Normal Sclera/Conjunctiva] : normal sclera/conjunctiva [Normal Oropharynx] : the oropharynx was normal [Normal Outer Ear/Nose] : the outer ears and nose were normal in appearance [Normal TMs] : both tympanic membranes were normal [No JVD] : no jugular venous distention [Normal Nasal Mucosa] : the nasal mucosa was normal [No Lymphadenopathy] : no lymphadenopathy [Supple] : supple [Thyroid Normal, No Nodules] : the thyroid was normal and there were no nodules present [Clear to Auscultation] : lungs were clear to auscultation bilaterally [No Respiratory Distress] : no respiratory distress  [No Accessory Muscle Use] : no accessory muscle use [Regular Rhythm] : with a regular rhythm [Normal Rate] : normal rate  [Normal S1, S2] : normal S1 and S2 [No Murmur] : no murmur heard [No Carotid Bruits] : no carotid bruits [No Abdominal Bruit] : a ~M bruit was not heard ~T in the abdomen [No Varicosities] : no varicosities [No Palpable Aorta] : no palpable aorta [Pedal Pulses Present] : the pedal pulses are present [No Edema] : there was no peripheral edema [No Extremity Clubbing/Cyanosis] : no extremity clubbing/cyanosis [Soft] : abdomen soft [Non Tender] : non-tender [Non-distended] : non-distended [No HSM] : no HSM [No Masses] : no abdominal mass palpated [Normal Bowel Sounds] : normal bowel sounds [Normal Supraclavicular Nodes] : no supraclavicular lymphadenopathy [Normal Anterior Cervical Nodes] : no anterior cervical lymphadenopathy [Normal Posterior Cervical Nodes] : no posterior cervical lymphadenopathy [No CVA Tenderness] : no CVA  tenderness [No Spinal Tenderness] : no spinal tenderness [No Joint Swelling] : no joint swelling [Grossly Normal Strength/Tone] : grossly normal strength/tone [No Skin Lesions] : no skin lesions [Coordination Grossly Intact] : coordination grossly intact [No Rash] : no rash [No Focal Deficits] : no focal deficits [Normal Gait] : normal gait [Deep Tendon Reflexes (DTR)] : deep tendon reflexes were 2+ and symmetric [Memory Grossly Normal] : memory grossly normal [Speech Grossly Normal] : speech grossly normal [Alert and Oriented x3] : oriented to person, place, and time [Normal Affect] : the affect was normal [de-identified] : tremor [Normal Mood] : the mood was normal [Normal Insight/Judgement] : insight and judgment were intact

## 2023-04-27 NOTE — DISCHARGE NOTE PROVIDER - NSDCHHHOMEBOUND_GEN_ALL_CORE
4/27/2023         RE: Ursula De La Cruz  [address] [place] [address]        Dear Colleague,    Thank you for referring your patient, Ursula De La Cruz, to the [facility] [place]. Please see a copy of my visit note below.        Vein Clinic Sclerotherapy Note     Indications:  1.  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  Residual, bilateral extremity spider telangiectasias of cosmetic concern status post 1 session cosmetic sclerotherapy     Procedure:  1.  Medically necessary, ultrasound-guided sclerotherapy right distal medial thigh and proximal medial calf varicose veins  2.  Bilateral lower extremity cosmetic sclerotherapy     Procedure description  Details of procedure including risks of allergic reaction, deep vein thrombosis, ulceration, superficial thrombophlebitis and hyperpigmentation were discussed.  The patient voiced understanding and wished to proceed.  Informed consent was obtained.    Medically necessary sclerotherapy  I imaged the right medial thigh and noted that the majority of the large varicosities along the medial thigh were closed.  In close interrogation I identified sizable, compressible varicosities more posteriorly on the distal third of the thigh which communicated with a  at the mid thigh level.  I isolated this with ultrasound and directed a 27-gauge needle into 2 different locations of these veins filling them with 1% polidocanol foam.    I then imaged in the proximal medial right leg injecting 1% polidocanol foam under outside guidance into the varicosities at this location.  I then imaged about 6 cm below the knee and injected 1% polidocanol foam into the vein branch which coursed distally and somewhat laterally.    There was trapped blood in a varicosity in the proximal posterior medial right thigh and on the distal  posterior medial right thigh which we cleaned with alcohol, made stab wounds an 18-gauge needle and expressed thrombus.    Cosmetic sclerotherapy  I donned the Syris headlight and injected telangiectasias on the proximal lateral left leg, and the left popliteal fossa and extending down the posterior left calf and a few scattered over the right anteromedial leg and distal anterior leg using 0.5% polidocanol foam.  There were a few scattered areas of her thighs that we also injected.  I had her perform ankle pumps.    I cleaned an area of trapped blood in the previously injected telangiectasias on the lateral left leg with alcohol, made stab wounds with an 18-gauge needle and expressed thrombus.  We cleaned her legs, had her don compression, then had her walk for 10 minutes.    She may return in 6 weeks to check for trapped blood.  I would not plan ultrasound interrogation at that time.    Sclerotherapy    Date/Time: 4/27/2023 4:13 PM    Performed by: Herberth Izquierdo MD  Authorized by: Herberth Izquierdo MD    Time out: Immediately prior to the procedure a time out was called    Type:  Medically Necessary  Vein:  Multiple Veins  Yes    Procedure side:  Right  Solution/Amount:  1% POLIDOCANOL  Syringes:  1 syringe (3 mL)  Evacuation of intraluminal thrombus under sterile conditions without complications.    Patient tolerance:  Patient tolerated the procedure well with no immediate complications  Wrap/Hose:  Hose  Sclerotherapy    Date/Time: 4/27/2023 4:13 PM    Performed by: Herberth Izquierdo MD  Authorized by: Herberth Izquierdo MD    Type:  Cosmetic  Session:  Full  Procedure side:  Bilateral  Solution/Amount:  .5 POLIDOCANOL  Syringes:  3  Evacuation of intraluminal thrombus under sterile conditions without complications.    Patient tolerance:  Patient tolerated the procedure well with no immediate complications  Wrap/Hose:  Massimo Izquierdo MD    Dictated using  Dragon voice recognition software which may result in transcription errors      Again, thank you for allowing me to participate in the care of your patient.        Sincerely,        Herberth Izquierdo MD     Fall risk

## 2025-02-14 NOTE — ED PROVIDER NOTE - DISPOSITION TYPE
ADMIT [FreeTextEntry1] : 1. ESRD on IHD: Ms. MALLY KAMINKSI is a fair candidate for kidney transplantation. Even though she had had a complex medical and surgical h/o, she has excellent functional status and looks better in person than in paper.  2. Cardiac risk: echo, stress test and cardiac evaluation  3. Cancer screening: colonoscopy, Mammo, Pap  4. ID: Serology for acute and chronic viral infections. Screening for latent TB 5. Imaging: Renal/abdominal /chest /Iliac imaging 6. Hepatology follow-up for fatty liver and inconclusive fibro scan 7. Neuro eval for prior CVA/TIA    I have personally discussed the risks and benefits of transplantation and patient attended transplant education class where the following was disclosed:   Reviewed factors affecting survival and morbidity while on dialysis, the transplant wait list and reviewed al-operative and long-term risk factors affecting outcome in kidney transplantation.    One year SRTR outcomes for national and [facility] were discussed in regards to patient survival and graft survival after transplantation.    Details of transplant surgery, including complications were discussed. Immunosuppression and complications including infection including life threatening sepsis and opportunistic infections, malignancy and new onset diabetes were discussed.    Benefits of live donor transplantation as well as variability in wait times across regions and multiple listing were discussed.  KDPI >85% and PHS high risk criteria donors were discussed.  HCV kidney transplantation was discussed.   Will proceed with completing/ updating work up and listing for transplant/ live donor transplant once work up is reviewed and found to be acceptable by multidisciplinary listing committee.

## 2025-04-07 NOTE — PHYSICAL EXAM
Home PT [No Acute Distress] : no acute distress [Well Nourished] : well nourished [Well Developed] : well developed [Well-Appearing] : well-appearing [Normal Voice/Communication] : normal voice/communication [Normal Sclera/Conjunctiva] : normal sclera/conjunctiva [PERRL] : pupils equal round and reactive to light [EOMI] : extraocular movements intact [Normal Outer Ear/Nose] : the outer ears and nose were normal in appearance [Normal Oropharynx] : the oropharynx was normal [No JVD] : no jugular venous distention [Supple] : supple [No Lymphadenopathy] : no lymphadenopathy [Thyroid Normal, No Nodules] : the thyroid was normal and there were no nodules present [No Respiratory Distress] : no respiratory distress  [Clear to Auscultation] : lungs were clear to auscultation bilaterally [No Accessory Muscle Use] : no accessory muscle use [Normal Rate] : normal rate  [Regular Rhythm] : with a regular rhythm [Normal S1, S2] : normal S1 and S2 [No Murmur] : no murmur heard [No Carotid Bruits] : no carotid bruits [No Abdominal Bruit] : a ~M bruit was not heard ~T in the abdomen [No Varicosities] : no varicosities [Pedal Pulses Present] : the pedal pulses are present [No Edema] : there was no peripheral edema [No Extremity Clubbing/Cyanosis] : no extremity clubbing/cyanosis [No Palpable Aorta] : no palpable aorta [Soft] : abdomen soft [Non Tender] : non-tender [Non-distended] : non-distended [No Masses] : no abdominal mass palpated [No HSM] : no HSM [Normal Bowel Sounds] : normal bowel sounds [Normal Posterior Cervical Nodes] : no posterior cervical lymphadenopathy [Normal Anterior Cervical Nodes] : no anterior cervical lymphadenopathy [No CVA Tenderness] : no CVA  tenderness [No Spinal Tenderness] : no spinal tenderness [No Joint Swelling] : no joint swelling [Grossly Normal Strength/Tone] : grossly normal strength/tone [No Rash] : no rash [Normal Gait] : normal gait [Coordination Grossly Intact] : coordination grossly intact [No Focal Deficits] : no focal deficits [Deep Tendon Reflexes (DTR)] : deep tendon reflexes were 2+ and symmetric [Normal Affect] : the affect was normal [Normal Insight/Judgement] : insight and judgment were intact [de-identified] : Essential tremor